# Patient Record
Sex: MALE | Race: WHITE | NOT HISPANIC OR LATINO | Employment: FULL TIME | URBAN - METROPOLITAN AREA
[De-identification: names, ages, dates, MRNs, and addresses within clinical notes are randomized per-mention and may not be internally consistent; named-entity substitution may affect disease eponyms.]

---

## 2017-01-16 ENCOUNTER — OFFICE VISIT (OUTPATIENT)
Dept: ENDOCRINOLOGY | Age: 52
End: 2017-01-16

## 2017-01-16 VITALS
WEIGHT: 315 LBS | HEART RATE: 117 BPM | HEIGHT: 71 IN | SYSTOLIC BLOOD PRESSURE: 144 MMHG | DIASTOLIC BLOOD PRESSURE: 80 MMHG | BODY MASS INDEX: 44.1 KG/M2

## 2017-01-16 DIAGNOSIS — E78.1 HYPERTRIGLYCERIDEMIA: ICD-10-CM

## 2017-01-16 DIAGNOSIS — E11.9 TYPE 2 DIABETES MELLITUS WITHOUT COMPLICATION, WITHOUT LONG-TERM CURRENT USE OF INSULIN (HCC): Primary | ICD-10-CM

## 2017-01-16 DIAGNOSIS — I10 ESSENTIAL HYPERTENSION: ICD-10-CM

## 2017-01-16 PROCEDURE — 99244 OFF/OP CNSLTJ NEW/EST MOD 40: CPT | Performed by: INTERNAL MEDICINE

## 2017-01-16 RX ORDER — CHLORAL HYDRATE 500 MG
1000 CAPSULE ORAL
COMMUNITY
End: 2019-05-10

## 2017-01-16 NOTE — MR AVS SNAPSHOT
[facility] ENDOCRINOLOGY  [phone]                    Lauri Garcia   1/16/2017 12:15 PM   Office Visit    Dept Phone:  [phone]   Encounter #:  [id number]    Provider:  Rakesh Weir MD   Department:  [facility] ENDOCRINOLOGY                Your Full Care Plan              Today's Medication Changes          These changes are accurate as of: 1/16/17  1:12 PM.  If you have any questions, ask your nurse or doctor.               New Medication(s)Ordered:     Canagliflozin 100 MG tablet   Take 100 mg by mouth Daily.   Started by:  Rakesh Weir MD       SITagliptin 100 MG tablet   Commonly known as:  JANUVIA   Take 1 tablet by mouth Daily.   Started by:  Rakesh Weir MD         Medication(s)that have changed:     metFORMIN 1000 MG tablet   Commonly known as:  GLUCOPHAGE   Take 1 tablet by mouth 2 (Two) Times a Day With Meals.   What changed:    - medication strength  - how much to take  - when to take this   Changed by:  Rakesh Weir MD            Where to Get Your Medications      These medications were sent to [facility] [address] [place] [address] [address] AT [facility] [phone] [facility] [phone] [place] [address] [address] [facility] [address]     Phone:  [phone]     Canagliflozin 100 MG tablet    metFORMIN 1000 MG tablet    SITagliptin 100 MG tablet                  Your Updated Medication List          This list is accurate as of: 1/16/17  1:12 PM.  Always use your most recent med list.                allopurinol 300 MG tablet   Commonly known as:  ZYLOPRIM       amoxicillin 500 MG tablet   Commonly known as:  AMOXIL       budesonide-formoterol 160-4.5 MCG/ACT inhaler   Commonly known as:  SYMBICORT   Inhale 2 puffs 2 (two) times a day.       Canagliflozin 100 MG tablet   Take 100 mg by mouth Daily.       cetirizine 10 MG tablet   Commonly known as:  zyrTEC       COCONUT OIL PO       fish oil 1000 MG  capsule capsule       FLAX SEEDS PO       FLONASE ALLERGY RELIEF NA       glucose blood test strip   TEST BLOOD SUGAR TWICE DAILY.       HYDROcodone-acetaminophen 7.5-325 MG per tablet   Commonly known as:  NORCO       KLOR-CON/EF 25 MEQ disintegrating tablet   Generic drug:  potassium bicarbonate       lisinopril 20 MG tablet   Commonly known as:  PRINIVIL,ZESTRIL   Take 1 tablet by mouth Daily.       lisinopril-hydrochlorothiazide 20-25 MG per tablet   Commonly known as:  PRINZIDE,ZESTORETIC   Take 1 tablet by mouth Daily.       melatonin 5 MG tablet tablet       metFORMIN 1000 MG tablet   Commonly known as:  GLUCOPHAGE   Take 1 tablet by mouth 2 (Two) Times a Day With Meals.       montelukast 10 MG tablet   Commonly known as:  SINGULAIR   Take 1 tablet by mouth every night.       MULTIVITAL-M PO       ONE TOUCH LANCETS misc   TEST BLOOD SUGAR TWICE DAILY.       SITagliptin 100 MG tablet   Commonly known as:  JANUVIA   Take 1 tablet by mouth Daily.       VENTOLIN  (90 BASE) MCG/ACT inhaler   Generic drug:  albuterol   INHALE 2 PUFFS BY MOUTH EVERY 4 HOURS AS NEEDED               You Were Diagnosed With        Codes Comments    Type 2 diabetes mellitus without complication, without long-term current use of insulin    -  Primary ICD-10-CM: E11.9  ICD-9-CM: 250.00     Hypertriglyceridemia     ICD-10-CM: E78.1  ICD-9-CM: 272.1     Essential hypertension     ICD-10-CM: I10  ICD-9-CM: 401.9       Instructions     None    Patient Instructions History      Upcoming Appointments     Visit Type Date Time Department    NEW PATIENT 1/16/2017 12:15 PM [facility]    [facility] 4/3/2017  8:40 AM [facility] K[facility]    OFFICE VISIT 4/17/2017  8:30 AM [facility]      MyChart Signup     Our records indicate that you have an active [employer] account.    You can view your After Visit Summary by going to [employer] and logging in with your [employer] username and password.  If you don't  "have a [employer] username and password but a parent or guardian has access to your record, the parent or guardian should login with their own [employer] username and password and access your record to view the After Visit Summary.    If you have questions, you can email [email or web address] or call [phone] to talk to our [employer] staff.  Remember, [employer] is NOT to be used for urgent needs.  For medical emergencies, dial 911.               Other Info from Your Visit           Your Appointments     Apr 03, 2017  8:40 AM EDT   [facility] A[facility]N [facility]IR[facility]   [facility] ENDOCRINOLOGY (--)    [address] [place] [id number] [phone]            Apr 17, 2017  8:30 AM EDT   Office Visit with Rakesh Weir MD   [facility] ENDOCRINOLOGY (--)    [email or web address] [place] [id number] [phone]           Arrive 15 minutes prior to appointment.              Allergies     No Known Allergies      Vital Signs     Blood Pressure Pulse Height Weight Body Mass Index Smoking Status    144/80 117 71\" (180.3 cm) 365 lb (166 kg) 50.91 kg/m2 Never Smoker      Problems and Diagnoses Noted     High blood pressure    Hypertriglyceridemia    Type 2 diabetes mellitus without complication        "

## 2017-01-16 NOTE — PROGRESS NOTES
CC : Type 2 dm    Lauri Garcia 51 y.o. presents with Type 2 dm as a new patient. Referred by Miss. Funez.     Type 2 dm - Diagnosed about 6 - 7 years ago. Was started on oral agents initially. Not on insulin at this time. Currently on Metformin 850 mg po tid with meals. Tolerating the medication with no diarrhea or stomach cramps.   Checks BG 2 times a day.   FBG -  150 - 180 mg/dl and Pre - meals -   150 - 200 mg/dl.   No increased urination or increased thirst. No BG less than 60 mg/dl in the last one month, does have hypoglycemic awareness. Highest BG in the last 1 month was -  226 mg/dl.  Pt got his log book for me. No nausea and vomiting.   Up to date with eye exam in July 2016 and no dm retinopathy.   No tingling or numbness in his b/l feet, no ulcers and amputations of his feet.   No CAD, CKD,CVA per pt.   Pt is not physically active due to asthma, must have been on steroids about 3 - 4 years ago. He is gaining weight since his asthma issues.   Pt tries to follow DM diet for most part.   On Ace inb.  Negative urine microalbuminuria  HbA1c from November 2016-8%    Hx of Kidney stones - in Dec 2010, sees Dr. Peterson. No further attacks after that episode. On Allopurinol and Klor-con.     I have reviewed the patient's allergies, medicines, past medical hx, family hx and social hx in detail.    Past Medical History   Diagnosis Date   • Allergic rhinitis      controlled on meds, followed by allergist (Dr. Kuo)   • Allergic rhinitis    • Asthma      controlled on meds, followed by allergist (Dr. Kuo)   • Diabetes mellitus, type II    • Hypertension    • Kidney stone      on allopurinol and klor-con (Dr. Peterson)   • Sleep apnea      on cpap, followed by sleep medicine   • Sleep apnea        Family History   Problem Relation Age of Onset   • Diabetes Mother    • Hypertension Mother    • Coronary artery disease Father      (60's)       Social History     Social History   • Marital status:      Spouse  name: N/A   • Number of children: N/A   • Years of education: N/A     Occupational History   • Not on file.     Social History Main Topics   • Smoking status: Never Smoker   • Smokeless tobacco: Never Used   • Alcohol use No   • Drug use: No   • Sexual activity: Defer     Other Topics Concern   • Not on file     Social History Narrative       No Known Allergies      Current Outpatient Prescriptions:   •  allopurinol (ZYLOPRIM) 300 MG tablet, , Disp: , Rfl:   •  budesonide-formoterol (SYMBICORT) 160-4.5 MCG/ACT inhaler, Inhale 2 puffs 2 (two) times a day., Disp: 1 inhaler, Rfl: 12  •  cetirizine (ZyrTEC) 10 MG tablet, , Disp: , Rfl:   •  COCONUT OIL PO, Take  by mouth., Disp: , Rfl:   •  Flaxseed, Linseed, (FLAX SEEDS PO), Take  by mouth., Disp: , Rfl:   •  Fluticasone Propionate (FLONASE ALLERGY RELIEF NA), into each nostril., Disp: , Rfl:   •  glucose blood test strip, TEST BLOOD SUGAR TWICE DAILY., Disp: 100 each, Rfl: 3  •  HYDROcodone-acetaminophen (NORCO) 7.5-325 MG per tablet, , Disp: , Rfl:   •  KLOR-CON/EF 25 MEQ disintegrating tablet, TAKE 2 TABLETS BY MOUTH EVERY DAY, Disp: , Rfl: 4  •  lisinopril (PRINIVIL,ZESTRIL) 20 MG tablet, Take 1 tablet by mouth Daily., Disp: 30 tablet, Rfl: 6  •  lisinopril-hydrochlorothiazide (PRINZIDE,ZESTORETIC) 20-25 MG per tablet, Take 1 tablet by mouth Daily., Disp: 30 tablet, Rfl: 5  •  metFORMIN (GLUCOPHAGE) 1000 MG tablet, Take 1 tablet by mouth 2 (Two) Times a Day With Meals., Disp: 60 tablet, Rfl: 3  •  montelukast (SINGULAIR) 10 MG tablet, Take 1 tablet by mouth every night., Disp: 30 tablet, Rfl: 11  •  Multiple Vitamins-Minerals (MULTIVITAL-M PO), Take  by mouth., Disp: , Rfl:   •  Omega-3 Fatty Acids (FISH OIL) 1000 MG capsule capsule, Take  by mouth Daily With Breakfast., Disp: , Rfl:   •  ONE TOUCH LANCETS misc, TEST BLOOD SUGAR TWICE DAILY., Disp: 200 each, Rfl: 3  •  VENTOLIN  (90 BASE) MCG/ACT inhaler, INHALE 2 PUFFS BY MOUTH EVERY 4 HOURS AS NEEDED,  "Disp: 1 inhaler, Rfl: 3  •  amoxicillin (AMOXIL) 500 MG tablet, , Disp: , Rfl:   •  Canagliflozin 100 MG tablet, Take 100 mg by mouth Daily., Disp: 30 tablet, Rfl: 3  •  melatonin 5 MG tablet tablet, Take 5 mg by mouth nightly., Disp: , Rfl:   •  SITagliptin (JANUVIA) 100 MG tablet, Take 1 tablet by mouth Daily., Disp: 30 tablet, Rfl: 3    Review of Systems   Constitutional: Negative for appetite change, chills, diaphoresis and fatigue.   HENT: Negative for hearing loss, nosebleeds, postnasal drip, trouble swallowing and voice change.    Eyes: Negative for photophobia, discharge and visual disturbance.   Respiratory: Positive for shortness of breath and wheezing. Negative for cough.    Cardiovascular: Negative for chest pain, palpitations and leg swelling.   Gastrointestinal: Negative for abdominal distention, abdominal pain, constipation, diarrhea, nausea and vomiting.   Endocrine: Negative for cold intolerance, heat intolerance, polydipsia and polyuria.   Genitourinary: Negative for dysuria, frequency and hematuria.   Musculoskeletal: Negative for arthralgias, back pain and myalgias.   Skin: Negative for pallor, rash and wound.   Neurological: Positive for headaches. Negative for dizziness, tremors, seizures, syncope, weakness and numbness.   Hematological: Negative for adenopathy.   Psychiatric/Behavioral: Positive for sleep disturbance. Negative for agitation and confusion. The patient is not nervous/anxious.        Objective:     Visit Vitals   • /80   • Pulse 117   • Ht 71\" (180.3 cm)   • Wt (!) 365 lb (166 kg)   • BMI 50.91 kg/m2       Physical Exam   Constitutional: He is oriented to person, place, and time. He appears well-developed and well-nourished.   Obese male   HENT:   Head: Normocephalic and atraumatic.   Mouth/Throat: Oropharynx is clear and moist.   Eyes: Conjunctivae and EOM are normal. Pupils are equal, round, and reactive to light.   Neck: Normal range of motion. Neck supple. Carotid bruit " is not present. No tracheal deviation present. No thyromegaly present.   Cardiovascular: Normal rate, regular rhythm and normal heart sounds.    Pulmonary/Chest: Effort normal and breath sounds normal. No stridor. He has no wheezes.   Abdominal: Soft. Bowel sounds are normal. He exhibits no distension. There is no tenderness. No hernia.   Central obesity   Musculoskeletal: Normal range of motion. He exhibits edema.   Lymphadenopathy:     He has no cervical adenopathy.   Neurological: He is alert and oriented to person, place, and time. He has normal reflexes.   Intact proprioception   Skin: Skin is warm and dry.   Psychiatric: He has a normal mood and affect. His behavior is normal. Judgment normal.   Vitals reviewed.         Results Review:    I reviewed the patient's new clinical results.    Lab on 11/10/2016   Component Date Value Ref Range Status   • Total Cholesterol 11/10/2016 171  100 - 199 mg/dL Final   • Triglycerides 11/10/2016 200* 0 - 149 mg/dL Final   • HDL Cholesterol 11/10/2016 32* >39 mg/dL Final    Comment: According to ATP-III Guidelines, HDL-C >59 mg/dL is considered a  negative risk factor for CHD.     • VLDL Cholesterol 11/10/2016 40  5 - 40 mg/dL Final   • LDL Cholesterol  11/10/2016 99  0 - 99 mg/dL Final   • LDL/HDL Ratio 11/10/2016 3.1  0.0 - 3.6 ratio units Final    Comment:                                     LDL/HDL Ratio                                              Men  Women                                1/2 Avg.Risk  1.0    1.5                                    Avg.Risk  3.6    3.2                                 2X Avg.Risk  6.2    5.0                                 3X Avg.Risk  8.0    6.1     • Hemoglobin A1C 11/10/2016 8.0* 4.8 - 5.6 % Final    Comment:          Pre-diabetes: 5.7 - 6.4           Diabetes: >6.4           Glycemic control for adults with diabetes: <7.0     • Glucose 11/10/2016 160* 65 - 99 mg/dL Final   • BUN 11/10/2016 13  6 - 24 mg/dL Final   • Creatinine  11/10/2016 0.87  0.76 - 1.27 mg/dL Final   • eGFR Non African Am 11/10/2016 101  >59 mL/min/1.73 Final   • eGFR African Am 11/10/2016 117  >59 mL/min/1.73 Final   • BUN/Creatinine Ratio 11/10/2016 15  9 - 20 Final   • Sodium 11/10/2016 140  136 - 144 mmol/L Final   • Potassium 11/10/2016 4.5  3.5 - 5.2 mmol/L Final   • Chloride 11/10/2016 95* 97 - 106 mmol/L Final   • Total CO2 11/10/2016 23  18 - 29 mmol/L Final   • Calcium 11/10/2016 9.5  8.7 - 10.2 mg/dL Final   • Total Protein 11/10/2016 6.9  6.0 - 8.5 g/dL Final   • Albumin 11/10/2016 4.1  3.5 - 5.5 g/dL Final   • Globulin 11/10/2016 2.8  1.5 - 4.5 g/dL Final   • A/G Ratio 11/10/2016 1.5  1.1 - 2.5 Final   • Total Bilirubin 11/10/2016 0.3  0.0 - 1.2 mg/dL Final   • Alkaline Phosphatase 11/10/2016 85  39 - 117 IU/L Final   • AST (SGOT) 11/10/2016 39  0 - 40 IU/L Final   • ALT (SGPT) 11/10/2016 57* 0 - 44 IU/L Final   • Creatinine, Urine 11/10/2016 95.8  Not Estab. mg/dL Final   • Microalbumin, Urine 11/10/2016 <3.0  Not Estab. ug/mL Final   • Microalbumin/Creatinine Ratio Urine 11/10/2016 <3.1  0.0 - 30.0 mg/g creat Final       Diagnoses and all orders for this visit:    Type 2 diabetes mellitus without complication, without long-term current use of insulin  -     Hemoglobin A1c; Future  -     Vitamin D 25 Hydroxy; Future  -     Vitamin B12 & Folate; Future  -     TSH; Future  -     Microalbumin / Creatinine Urine Ratio; Future  -     Lipid Panel; Future  -     Comprehensive Metabolic Panel; Future    Hypertriglyceridemia  -     Hemoglobin A1c; Future  -     Vitamin D 25 Hydroxy; Future  -     Vitamin B12 & Folate; Future  -     TSH; Future  -     Microalbumin / Creatinine Urine Ratio; Future  -     Lipid Panel; Future  -     Comprehensive Metabolic Panel; Future    Essential hypertension  -     Hemoglobin A1c; Future  -     Vitamin D 25 Hydroxy; Future  -     Vitamin B12 & Folate; Future  -     TSH; Future  -     Microalbumin / Creatinine Urine Ratio; Future  -      Lipid Panel; Future  -     Comprehensive Metabolic Panel; Future    Other orders  -     SITagliptin (JANUVIA) 100 MG tablet; Take 1 tablet by mouth Daily.  -     Canagliflozin 100 MG tablet; Take 100 mg by mouth Daily.  -     metFORMIN (GLUCOPHAGE) 1000 MG tablet; Take 1 tablet by mouth 2 (Two) Times a Day With Meals.    Type 2 diabetes mellitus with HbA1c of 8% from November 2016  Will change metformin to 1000 mg twice daily  Will add Januvia 100 mg once daily  Will and Invokana 100 mg once daily  Counseled the patient about the side effects of the following medications-no prior history of pancreatitis, no history of thyroid cancer, counseled the patient about increased urination and probable urinary tract  infections.    Hyperlipidemia  Will check lipid panel prior to next visit  Patient takes fish oil, 1 tablet daily.    Morbid obesity  Counseled patient on increased physical activity     The total time spent more than 60 min for old record and lab review and face- to- face, of which greater than 50% of time was spent in counseling the patient on treatment options, instructions for  management/treatment and follow up and importance of compliance with chosen management or treatment options      Rakesh Weir MD  01/16/17

## 2017-01-16 NOTE — LETTER
January 16, 2017     Amelia Fuenz, АНДРЕЙ  [address]  [facility] [address]    Patient: Lauri Garcia   YOB: 1965   Date of Visit: 1/16/2017       Dear АНДРЕЙ Arauz:    Thank you for referring Lauri Garcia to me for evaluation. Below are the relevant portions of my assessment and plan of care.                   If you have questions, please do not hesitate to call me. I look forward to following Lauri along with you.         Sincerely,        Rakesh Weir MD        CC: No Recipients

## 2017-01-16 NOTE — LETTER
January 16, 2017     Amelia Funez, АНДРЕЙ  [address]  [facility] [address]    Patient: Lauri Garcia   YOB: 1965   Date of Visit: 1/16/2017       Dear Dr. NATALIIA Funez, АНДРЕЙ:    Lauri Garcia was in my office today. Below are the relevant portions of my assessment and plan of care.           If you have questions, please do not hesitate to call me. I look forward to following aLuri along with you.         Sincerely,        Rakesh Weir MD        CC: No Recipients

## 2017-01-18 DIAGNOSIS — J45.40 MODERATE PERSISTENT ASTHMA WITHOUT COMPLICATION: ICD-10-CM

## 2017-01-18 DIAGNOSIS — I10 ESSENTIAL HYPERTENSION: Primary | ICD-10-CM

## 2017-01-18 DIAGNOSIS — J45.30 MILD PERSISTENT ASTHMA WITHOUT COMPLICATION: Primary | ICD-10-CM

## 2017-01-18 RX ORDER — LISINOPRIL AND HYDROCHLOROTHIAZIDE 20; 12.5 MG/1; MG/1
2 TABLET ORAL DAILY
Qty: 90 TABLET | Refills: 3 | Status: SHIPPED | OUTPATIENT
Start: 2017-01-18 | End: 2017-01-27 | Stop reason: SDUPTHER

## 2017-01-18 RX ORDER — FLUTICASONE PROPIONATE 50 MCG
2 SPRAY, SUSPENSION (ML) NASAL DAILY
Qty: 1 EACH | Refills: 3 | Status: SHIPPED | OUTPATIENT
Start: 2017-01-18 | End: 2017-02-17

## 2017-01-18 RX ORDER — MONTELUKAST SODIUM 10 MG/1
10 TABLET ORAL NIGHTLY
Qty: 30 TABLET | Refills: 11 | Status: SHIPPED | OUTPATIENT
Start: 2017-01-18 | End: 2018-03-28 | Stop reason: SDUPTHER

## 2017-01-18 RX ORDER — LANCETS
EACH MISCELLANEOUS
Qty: 200 EACH | Refills: 3 | Status: SHIPPED | OUTPATIENT
Start: 2017-01-18 | End: 2018-08-29 | Stop reason: SDUPTHER

## 2017-01-18 NOTE — TELEPHONE ENCOUNTER
Please advise    [facility] pharmacist called and asked if patient can be switched from Symbicort to Breo due to his health insurance.     He also states that the patient is taking two kinds of Lisinopril and wanted us to know that Lisinopril does come in a 20/12.5mg and if we wanted to switch it the patient could take in BID.     90day refill on all.

## 2017-01-18 NOTE — TELEPHONE ENCOUNTER
----- Message from Blanca Palmer sent at 1/18/2017 11:58 AM EST -----  Contact: [facility] pharmacy  [phone]  Needs 90 day supply not 30 day supply which was sent today    SITagliptin (JANUVIA) 100 MG tablet 30 tablet       Take 1 tablet by mouth      anagliflozin (INVOKANA) 100 MG tablet       Take 100 mg by mouth Daily. - Oral      Also--[facility]  metFORMIN (GLUCOPHAGE) 1000 MG tablet 7       Take 1 tablet by mouth 2 (Two) Times a Day With Meals.    Needs accuchex smartview test strips 200 90 day supply 2 x daily    accuchex fast chex lancets 204 90 day supply      Pharmacy     [facility] - [place], [place] - [facility] [address] [facility] [phone] [facility] [phone] FX              Sent to [facility] pharmacy

## 2017-01-27 DIAGNOSIS — I10 ESSENTIAL HYPERTENSION: ICD-10-CM

## 2017-01-27 RX ORDER — LISINOPRIL AND HYDROCHLOROTHIAZIDE 20; 12.5 MG/1; MG/1
2 TABLET ORAL DAILY
Qty: 90 TABLET | Refills: 3 | Status: SHIPPED | OUTPATIENT
Start: 2017-01-27

## 2017-01-27 NOTE — TELEPHONE ENCOUNTER
Pt called stating that mediation was sent to wrong pharmacy sent Lisinopril hydrochlorothiazide 20-12.5 to [facility]

## 2017-04-03 ENCOUNTER — LAB (OUTPATIENT)
Dept: ENDOCRINOLOGY | Age: 52
End: 2017-04-03

## 2017-04-03 DIAGNOSIS — I10 ESSENTIAL HYPERTENSION: ICD-10-CM

## 2017-04-03 DIAGNOSIS — E11.9 TYPE 2 DIABETES MELLITUS WITHOUT COMPLICATION, WITHOUT LONG-TERM CURRENT USE OF INSULIN (HCC): ICD-10-CM

## 2017-04-03 DIAGNOSIS — E78.1 HYPERTRIGLYCERIDEMIA: ICD-10-CM

## 2017-04-03 DIAGNOSIS — E55.9 VITAMIN D DEFICIENCY: ICD-10-CM

## 2017-04-03 DIAGNOSIS — E55.9 VITAMIN D DEFICIENCY: Primary | ICD-10-CM

## 2017-04-04 LAB
25(OH)D3+25(OH)D2 SERPL-MCNC: 42 NG/ML (ref 30–100)
ALBUMIN SERPL-MCNC: 4.3 G/DL (ref 3.5–5.2)
ALBUMIN/CREAT UR: <2.4 MG/G CREAT (ref 0–30)
ALBUMIN/GLOB SERPL: 1.7 G/DL
ALP SERPL-CCNC: 73 U/L (ref 39–117)
ALT SERPL-CCNC: 51 U/L (ref 1–41)
AST SERPL-CCNC: 35 U/L (ref 1–40)
BILIRUB SERPL-MCNC: 0.4 MG/DL (ref 0.1–1.2)
BUN SERPL-MCNC: 14 MG/DL (ref 6–20)
BUN/CREAT SERPL: 13.1 (ref 7–25)
CALCIUM SERPL-MCNC: 10.3 MG/DL (ref 8.6–10.5)
CHLORIDE SERPL-SCNC: 97 MMOL/L (ref 98–107)
CHOLEST SERPL-MCNC: 152 MG/DL (ref 0–200)
CO2 SERPL-SCNC: 29.1 MMOL/L (ref 22–29)
CREAT SERPL-MCNC: 1.07 MG/DL (ref 0.76–1.27)
CREAT UR-MCNC: 123.4 MG/DL
FOLATE SERPL-MCNC: 18.86 NG/ML (ref 4.78–24.2)
GLOBULIN SER CALC-MCNC: 2.5 GM/DL
GLUCOSE SERPL-MCNC: 115 MG/DL (ref 65–99)
HBA1C MFR BLD: 7 % (ref 4.8–5.6)
HDLC SERPL-MCNC: 27 MG/DL (ref 40–60)
LDLC SERPL CALC-MCNC: 88 MG/DL (ref 0–100)
MICROALBUMIN UR-MCNC: <3 UG/ML
POTASSIUM SERPL-SCNC: 4.6 MMOL/L (ref 3.5–5.2)
PROT SERPL-MCNC: 6.8 G/DL (ref 6–8.5)
SODIUM SERPL-SCNC: 140 MMOL/L (ref 136–145)
TRIGL SERPL-MCNC: 187 MG/DL (ref 0–150)
TSH SERPL DL<=0.005 MIU/L-ACNC: 3.11 MIU/ML (ref 0.27–4.2)
VIT B12 SERPL-MCNC: 372 PG/ML (ref 211–946)
VLDLC SERPL CALC-MCNC: 37.4 MG/DL (ref 5–40)

## 2017-04-24 ENCOUNTER — OFFICE VISIT (OUTPATIENT)
Dept: ENDOCRINOLOGY | Age: 52
End: 2017-04-24

## 2017-04-24 VITALS
HEART RATE: 96 BPM | HEIGHT: 72 IN | BODY MASS INDEX: 42.66 KG/M2 | OXYGEN SATURATION: 92 % | DIASTOLIC BLOOD PRESSURE: 74 MMHG | SYSTOLIC BLOOD PRESSURE: 112 MMHG | WEIGHT: 315 LBS

## 2017-04-24 DIAGNOSIS — E78.49 OTHER HYPERLIPIDEMIA: ICD-10-CM

## 2017-04-24 DIAGNOSIS — IMO0002 UNCONTROLLED TYPE 2 DIABETES MELLITUS WITH COMPLICATION, WITHOUT LONG-TERM CURRENT USE OF INSULIN: Primary | ICD-10-CM

## 2017-04-24 PROCEDURE — 99214 OFFICE O/P EST MOD 30 MIN: CPT | Performed by: INTERNAL MEDICINE

## 2017-04-24 NOTE — PROGRESS NOTES
52 y.o.    Patient Care Team:  АНДРЕЙ Saez as PCP - General (Family Medicine)    Chief Complaint: Type 2 dm     Subjective     HPI  Lauri Garcia 51 y.o. presents with Type 2 dm as a follow up patient. Referred by Miss. Funez.      Type 2 dm - Diagnosed about 6 - 7 years ago. Was started on oral agents initially. Not on insulin at this time. Currently on Metformin 1000 mg po bid, Januvia 100 mg po daily, Invokana 100 mg po daily. Tolerating the medications with no diarrhea or stomach cramps, no burning sensation while urinating.   Checks BG 2 times a day.   FBG - 120 - 130  mg/dl and Pre - meals - 160 - 170 mg/dl.   C/o increased urination or increased thirst on invokana. No BG less than 60 mg/dl in the last one month, does have hypoglycemic awareness. Highest BG in the last 1 month was - 180 mg/dl.  Pt got his log book for me. No nausea and vomiting.   Up to date with eye exam in July 2016 and no dm retinopathy.   No tingling or numbness in his b/l feet, no ulcers and amputations of his feet.   No CAD, CKD,CVA per pt.   Pt is not physically active due to asthma, must have been on steroids about 3 - 4 years ago. He is gaining weight since his asthma issues.   Pt tries to follow DM diet for most part.   On Ace inb.  Negative urine microalbuminuria    Hba1c improved to 7%.      Hx of Kidney stones - in Dec 2010, sees Dr. Peterson. No further attacks after that episode. On Allopurinol and Klor-con.     Hyperlipidemia-on fish oil.     Interval History      The following portions of the patient's history were reviewed and updated as appropriate: allergies, current medications, past family history, past medical history, past social history, past surgical history and problem list.    Past Medical History:   Diagnosis Date   • Allergic rhinitis     controlled on meds, followed by allergist (Dr. Kuo)   • Allergic rhinitis    • Asthma     controlled on meds, followed by allergist (Dr. Kuo)   • Diabetes mellitus,  type II    • Hypertension    • Kidney stone     on allopurinol and klor-con (Dr. Peterson)   • Sleep apnea     on cpap, followed by sleep medicine   • Sleep apnea      Family History   Problem Relation Age of Onset   • Diabetes Mother    • Hypertension Mother    • Coronary artery disease Father      (60's)     Social History     Social History   • Marital status:      Spouse name: N/A   • Number of children: N/A   • Years of education: N/A     Occupational History   • Not on file.     Social History Main Topics   • Smoking status: Never Smoker   • Smokeless tobacco: Never Used   • Alcohol use No   • Drug use: No   • Sexual activity: Defer     Other Topics Concern   • Not on file     Social History Narrative     No Known Allergies    Current Outpatient Prescriptions:   •  ACCU-CHEK FASTCLIX LANCETS misc, Use to check blood sugar 2 times daily, Disp: 200 each, Rfl: 3  •  allopurinol (ZYLOPRIM) 300 MG tablet, , Disp: , Rfl:   •  Canagliflozin (INVOKANA) 100 MG tablet, Take 100 mg by mouth Daily., Disp: 90 tablet, Rfl: 3  •  cetirizine (ZyrTEC) 10 MG tablet, , Disp: , Rfl:   •  COCONUT OIL PO, Take  by mouth., Disp: , Rfl:   •  Flaxseed, Linseed, (FLAX SEEDS PO), Take  by mouth., Disp: , Rfl:   •  Fluticasone Furoate-Vilanterol (BREO ELLIPTA) 100-25 MCG/INH aerosol powder , Inhale 1 puff Daily., Disp: 1 each, Rfl: 11  •  glucose blood (ACCU-CHEK SMARTVIEW) test strip, Use to check blood sugar 2 times daily, Disp: 200 each, Rfl: 3  •  KLOR-CON/EF 25 MEQ disintegrating tablet, TAKE 2 TABLETS BY MOUTH EVERY DAY, Disp: , Rfl: 4  •  lisinopril (PRINIVIL,ZESTRIL) 20 MG tablet, Take 1 tablet by mouth Daily., Disp: 30 tablet, Rfl: 6  •  lisinopril-hydrochlorothiazide (ZESTORETIC) 20-12.5 MG per tablet, Take 2 tablets by mouth Daily., Disp: 90 tablet, Rfl: 3  •  melatonin 5 MG tablet tablet, Take 5 mg by mouth nightly., Disp: , Rfl:   •  metFORMIN (GLUCOPHAGE) 1000 MG tablet, Take 1 tablet by mouth 2 (Two) Times a Day  "With Meals., Disp: 180 tablet, Rfl: 3  •  montelukast (SINGULAIR) 10 MG tablet, Take 1 tablet by mouth Every Night., Disp: 30 tablet, Rfl: 11  •  Multiple Vitamins-Minerals (MULTIVITAL-M PO), Take  by mouth., Disp: , Rfl:   •  Omega-3 Fatty Acids (FISH OIL) 1000 MG capsule capsule, Take  by mouth Daily With Breakfast., Disp: , Rfl:   •  SITagliptin (JANUVIA) 100 MG tablet, Take 1 tablet by mouth Daily., Disp: 90 tablet, Rfl: 3  •  VENTOLIN  (90 BASE) MCG/ACT inhaler, INHALE 2 PUFFS BY MOUTH EVERY 4 HOURS AS NEEDED, Disp: 1 inhaler, Rfl: 3        Review of Systems   Constitutional: Negative for fever.   HENT: Negative for facial swelling, nosebleeds, trouble swallowing and voice change.    Eyes: Negative for pain and redness.   Respiratory: Negative for shortness of breath and wheezing.    Cardiovascular: Negative for palpitations and leg swelling.   Gastrointestinal: Negative for abdominal pain, diarrhea and vomiting.   Endocrine: Negative for polydipsia and polyuria.   Genitourinary: Negative for decreased urine volume and frequency.   Musculoskeletal: Negative for joint swelling and neck pain.   Skin: Negative for rash.   Allergic/Immunologic: Negative for immunocompromised state.   Neurological: Negative for seizures and facial asymmetry.   Hematological: Does not bruise/bleed easily.   Psychiatric/Behavioral: Negative for agitation and confusion.       Objective       Vitals:    04/24/17 0850   BP: 112/74   Pulse: 96   SpO2: 92%   Weight: (!) 338 lb 9.6 oz (154 kg)   Height: 71.5\" (181.6 cm)     Body mass index is 46.57 kg/(m^2).      Physical Exam   Constitutional: He is oriented to person, place, and time. He appears well-nourished.   Morbidly obese   HENT:   Head: Normocephalic and atraumatic.   Eyes: Conjunctivae and EOM are normal.   Neck: Normal range of motion. Neck supple. Carotid bruit is not present. No thyromegaly present.   Acanthosis nigricans   Cardiovascular: Normal rate and normal heart " sounds.    Pulmonary/Chest: Effort normal and breath sounds normal. No stridor. No respiratory distress. He has no wheezes.   Abdominal: Soft. Bowel sounds are normal. He exhibits no distension. There is no tenderness.   Central obesity   Musculoskeletal: He exhibits no edema or tenderness.   Lymphadenopathy:     He has no cervical adenopathy.   Neurological: He is alert and oriented to person, place, and time.   Decreased pin prick and intact proprioception   Skin: Skin is warm and dry.   Psychiatric: He has a normal mood and affect. His behavior is normal.   Vitals reviewed.    Results Review:     I reviewed the patient's new clinical results.    Medical records reviewed  Summary:done      Lab on 04/03/2017   Component Date Value Ref Range Status   • Hemoglobin A1C 04/03/2017 7.00* 4.80 - 5.60 % Final    Comment: Hemoglobin A1C Ranges:  Increased Risk for Diabetes  5.7% to 6.4%  Diabetes                     >= 6.5%  Diabetic Goal                < 7.0%     • Vitamin B-12 04/03/2017 372  211 - 946 pg/mL Final   • Folate 04/03/2017 18.86  4.78 - 24.20 ng/mL Final   • TSH 04/03/2017 3.110  0.270 - 4.200 mIU/mL Final   • Creatinine, Urine 04/03/2017 123.4  Not Estab. mg/dL Final   • Microalbumin, Urine 04/03/2017 <3.0  Not Estab. ug/mL Final   • Microalbumin/Creatinine Ratio Urine 04/03/2017 <2.4  0.0 - 30.0 mg/g creat Final   • Total Cholesterol 04/03/2017 152  0 - 200 mg/dL Final   • Triglycerides 04/03/2017 187* 0 - 150 mg/dL Final   • HDL Cholesterol 04/03/2017 27* 40 - 60 mg/dL Final   • VLDL Cholesterol 04/03/2017 37.4  5 - 40 mg/dL Final   • LDL Cholesterol  04/03/2017 88  0 - 100 mg/dL Final   • Glucose 04/03/2017 115* 65 - 99 mg/dL Final   • BUN 04/03/2017 14  6 - 20 mg/dL Final   • Creatinine 04/03/2017 1.07  0.76 - 1.27 mg/dL Final   • eGFR Non African Am 04/03/2017 73  >60 mL/min/1.73 Final   • eGFR African Am 04/03/2017 89  >60 mL/min/1.73 Final   • BUN/Creatinine Ratio 04/03/2017 13.1  7.0 - 25.0 Final    • Sodium 04/03/2017 140  136 - 145 mmol/L Final   • Potassium 04/03/2017 4.6  3.5 - 5.2 mmol/L Final   • Chloride 04/03/2017 97* 98 - 107 mmol/L Final   • Total CO2 04/03/2017 29.1* 22.0 - 29.0 mmol/L Final   • Calcium 04/03/2017 10.3  8.6 - 10.5 mg/dL Final   • Total Protein 04/03/2017 6.8  6.0 - 8.5 g/dL Final   • Albumin 04/03/2017 4.30  3.50 - 5.20 g/dL Final   • Globulin 04/03/2017 2.5  gm/dL Final   • A/G Ratio 04/03/2017 1.7  g/dL Final   • Total Bilirubin 04/03/2017 0.4  0.1 - 1.2 mg/dL Final   • Alkaline Phosphatase 04/03/2017 73  39 - 117 U/L Final   • AST (SGOT) 04/03/2017 35  1 - 40 U/L Final   • ALT (SGPT) 04/03/2017 51* 1 - 41 U/L Final   • 25 Hydroxy, Vitamin D 04/03/2017 42.0  30.0 - 100.0 ng/mL Final    Comment: Vitamin D deficiency has been defined by the [place] [employer] and an Endocrine Society practice guideline as a  level of serum 25-OH vitamin D less than 20 ng/mL (1,2).  The Endocrine Society went on to further define vitamin D  insufficiency as a level between 21 and 29 ng/mL (2).  1. IOM ([place] [employer]). 2010. Dietary reference     intakes for calcium and D. [place]: The     National Academies Press.  2. Catrachito MF, Yesica BAKER, Tanvir CARDONA, et al.     Evaluation, treatment, and prevention of vitamin D     deficiency: an Endocrine Society clinical practice     guideline. JCEM. 2011 Jul; 96(9):1911-30.       Lab Results   Component Value Date    HGBA1C 7.00 (H) 04/03/2017    HGBA1C 8.0 (H) 11/10/2016    HGBA1C 7.90 (H) 08/10/2016     Lab Results   Component Value Date    MICROALBUR <3.0 04/03/2017    CREATININE 1.07 04/03/2017     Imaging Results (most recent)     None                Assessment and Plan:    Diagnoses and all orders for this visit:    Uncontrolled type 2 diabetes mellitus with complication, without long-term current use of insulin  -     Hemoglobin A1c; Future  -     Comprehensive Metabolic Panel; Future    Other hyperlipidemia  -     Hemoglobin  A1c; Future  -     Comprehensive Metabolic Panel; Future    Type 2 diabetes mellitus  HbA1c improving  Continue the current hypoglycemic agents.  Counseled patient to check his blood sugars 2-3 times a day.  Counseled patient on the side effects of the oral hypoglycemic agents.  Counseled patient to let others know when he goes on steroids so that we can and adjust his oral hypoglycemic agents or try insulin at that time.    Hyperlipidemia  Continue diet and exercise.  On fish oil.     13 minutes out of 25 minutes face to face spent in counseling the patient extensively on discussing the side effects of the medications and monitoring for them, how steroids would affect her blood sugars.

## 2017-07-10 ENCOUNTER — LAB (OUTPATIENT)
Dept: ENDOCRINOLOGY | Age: 52
End: 2017-07-10

## 2017-07-10 DIAGNOSIS — IMO0002 UNCONTROLLED TYPE 2 DIABETES MELLITUS WITH COMPLICATION, WITHOUT LONG-TERM CURRENT USE OF INSULIN: ICD-10-CM

## 2017-07-10 DIAGNOSIS — E78.49 OTHER HYPERLIPIDEMIA: ICD-10-CM

## 2017-07-10 LAB
ALBUMIN SERPL-MCNC: 4.4 G/DL (ref 3.5–5.2)
ALBUMIN/GLOB SERPL: 1.8 G/DL
ALP SERPL-CCNC: 70 U/L (ref 39–117)
ALT SERPL-CCNC: 29 U/L (ref 1–41)
AST SERPL-CCNC: 21 U/L (ref 1–40)
BILIRUB SERPL-MCNC: 0.4 MG/DL (ref 0.1–1.2)
BUN SERPL-MCNC: 16 MG/DL (ref 6–20)
BUN/CREAT SERPL: 15.1 (ref 7–25)
CALCIUM SERPL-MCNC: 9.7 MG/DL (ref 8.6–10.5)
CHLORIDE SERPL-SCNC: 95 MMOL/L (ref 98–107)
CO2 SERPL-SCNC: 28.6 MMOL/L (ref 22–29)
CREAT SERPL-MCNC: 1.06 MG/DL (ref 0.76–1.27)
GLOBULIN SER CALC-MCNC: 2.4 GM/DL
GLUCOSE SERPL-MCNC: 119 MG/DL (ref 65–99)
HBA1C MFR BLD: 6.8 % (ref 4.8–5.6)
POTASSIUM SERPL-SCNC: 4.3 MMOL/L (ref 3.5–5.2)
PROT SERPL-MCNC: 6.8 G/DL (ref 6–8.5)
SODIUM SERPL-SCNC: 138 MMOL/L (ref 136–145)

## 2017-07-24 ENCOUNTER — OFFICE VISIT (OUTPATIENT)
Dept: ENDOCRINOLOGY | Age: 52
End: 2017-07-24

## 2017-07-24 VITALS
WEIGHT: 315 LBS | OXYGEN SATURATION: 96 % | HEART RATE: 96 BPM | HEIGHT: 71 IN | BODY MASS INDEX: 44.1 KG/M2 | SYSTOLIC BLOOD PRESSURE: 112 MMHG | DIASTOLIC BLOOD PRESSURE: 62 MMHG

## 2017-07-24 DIAGNOSIS — IMO0002 UNCONTROLLED TYPE 2 DIABETES MELLITUS WITH COMPLICATION, WITHOUT LONG-TERM CURRENT USE OF INSULIN: Primary | ICD-10-CM

## 2017-07-24 DIAGNOSIS — E78.1 HYPERTRIGLYCERIDEMIA: ICD-10-CM

## 2017-07-24 DIAGNOSIS — E78.49 OTHER HYPERLIPIDEMIA: ICD-10-CM

## 2017-07-24 PROCEDURE — 99214 OFFICE O/P EST MOD 30 MIN: CPT | Performed by: INTERNAL MEDICINE

## 2017-07-24 NOTE — PROGRESS NOTES
52 y.o.    Patient Care Team:  АНДРЕЙ Saez as PCP - General (Family Medicine)    Chief Complaint:    3 Month Follow Up/ Type 2 Diabetes Mellitus  Subjective     HPI  Lauri Garcia 52 y.o. presents with Type 2 dm as a follow up patient. Referred by Phyllis Armin.     Type 2 diabetes mellitus-diagnosed about 7 years ago.  Currently on oral hypoglycemic agents.  On metformin thousand milligrams twice daily, Januvia 100 mg once daily, invokana 100 mg daily .  Tolerating the medications with no significant diarrhea or stomach cramps, no urinary tract infections.  Checks blood sugars 2 times a day.  Fasting blood [id number] 40 mg/dL and pre-meal blood [id number] 50 mg/dL.  Improved urination and thirst.  No blood sugar less than 60 in the last one month, does have hypoglycemic awareness.  Highest blood sugar in the last 1 month was 1 70 mg/dL.  Up to date with his eye exam in a few weeks ago-July 2017 and no diabetic retinopathy.  Patient got his log book for me to review.  No tingling or numbness in his bilateral feet, no ulcers or amputations.  No history of CAD, CK D, CVA.  Patient is trying to be physically active.  No recent asthma attacks and not been on steroids.  He is following diabetic diet for most part.  On Ace inhibitor   [id number]%.    History of kidney stones-no recent kidney stones.  Managed by urologist.    Hyperlipidemia  On fish oil.        Morbid obesity  Patient lost about 15 pounds of weight since his last visit by counting calories. He is monitoring his steps on the fit bit.     Interval History      The following portions of the patient's history were reviewed and updated as appropriate: allergies, current medications, past family history, past medical history, past social history, past surgical history and problem list.    Past Medical History:   Diagnosis Date   • Allergic rhinitis     controlled on meds, followed by allergist (Dr. Kuo)   • Allergic rhinitis    • Asthma     controlled  on meds, followed by allergist (Dr. Kuo)   • Diabetes mellitus, type II    • Hypertension    • Kidney stone     on allopurinol and klor-con (Dr. Peterson)   • Sleep apnea     on cpap, followed by sleep medicine   • Sleep apnea      Family History   Problem Relation Age of Onset   • Diabetes Mother    • Hypertension Mother    • Coronary artery disease Father      (60's)     Social History     Social History   • Marital status:      Spouse name: N/A   • Number of children: N/A   • Years of education: N/A     Occupational History   • Not on file.     Social History Main Topics   • Smoking status: Never Smoker   • Smokeless tobacco: Never Used   • Alcohol use No   • Drug use: No   • Sexual activity: Defer     Other Topics Concern   • Not on file     Social History Narrative     No Known Allergies    Current Outpatient Prescriptions:   •  ACCU-CHEK FASTCLIX LANCETS misc, Use to check blood sugar 2 times daily, Disp: 200 each, Rfl: 3  •  allopurinol (ZYLOPRIM) 300 MG tablet, , Disp: , Rfl:   •  Canagliflozin (INVOKANA) 100 MG tablet, Take 100 mg by mouth Daily., Disp: 90 tablet, Rfl: 3  •  cetirizine (ZyrTEC) 10 MG tablet, , Disp: , Rfl:   •  COCONUT OIL PO, Take  by mouth., Disp: , Rfl:   •  Flaxseed, Linseed, (FLAX SEEDS PO), Take  by mouth., Disp: , Rfl:   •  Fluticasone Furoate-Vilanterol (BREO ELLIPTA) 100-25 MCG/INH aerosol powder , Inhale 1 puff Daily., Disp: 1 each, Rfl: 11  •  glucose blood (ACCU-CHEK SMARTVIEW) test strip, Use to check blood sugar 2 times daily, Disp: 200 each, Rfl: 3  •  lisinopril (PRINIVIL,ZESTRIL) 20 MG tablet, Take 1 tablet by mouth Daily., Disp: 30 tablet, Rfl: 6  •  lisinopril-hydrochlorothiazide (ZESTORETIC) 20-12.5 MG per tablet, Take 2 tablets by mouth Daily., Disp: 90 tablet, Rfl: 3  •  melatonin 5 MG tablet tablet, Take 5 mg by mouth nightly., Disp: , Rfl:   •  metFORMIN (GLUCOPHAGE) 1000 MG tablet, Take 1 tablet by mouth 2 (Two) Times a Day With Meals., Disp: 180 tablet,  "Rfl: 3  •  montelukast (SINGULAIR) 10 MG tablet, Take 1 tablet by mouth Every Night., Disp: 30 tablet, Rfl: 11  •  Multiple Vitamins-Minerals (MULTIVITAL-M PO), Take  by mouth., Disp: , Rfl:   •  Omega-3 Fatty Acids (FISH OIL) 1000 MG capsule capsule, Take  by mouth Daily With Breakfast., Disp: , Rfl:   •  SITagliptin (JANUVIA) 100 MG tablet, Take 1 tablet by mouth Daily., Disp: 90 tablet, Rfl: 3  •  VENTOLIN  (90 BASE) MCG/ACT inhaler, INHALE 2 PUFFS BY MOUTH EVERY 4 HOURS AS NEEDED, Disp: 1 inhaler, Rfl: 3  •  KLOR-CON/EF 25 MEQ disintegrating tablet, TAKE 2 TABLETS BY MOUTH EVERY DAY, Disp: , Rfl: 4        Review of Systems   Constitutional: Negative for fever.   HENT: Negative for facial swelling, nosebleeds, trouble swallowing and voice change.    Eyes: Negative for pain and redness.   Respiratory: Positive for shortness of breath and wheezing.    Cardiovascular: Negative for palpitations and leg swelling.   Gastrointestinal: Negative for abdominal pain, diarrhea and vomiting.   Endocrine: Negative for polydipsia and polyuria.   Genitourinary: Negative for decreased urine volume and frequency.   Musculoskeletal: Negative for joint swelling and neck pain.   Skin: Negative for rash.   Allergic/Immunologic: Negative for immunocompromised state.   Neurological: Negative for seizures and facial asymmetry.   Hematological: Does not bruise/bleed easily.   Psychiatric/Behavioral: Negative for agitation and confusion.       Objective       Vitals:    07/24/17 0948   BP: 112/62   Pulse: 96   SpO2: 96%   Weight: (!) 323 lb (147 kg)   Height: 71\" (180.3 cm)     Body mass index is 45.05 kg/(m^2).      Physical Exam   Constitutional: He is oriented to person, place, and time. He appears well-nourished.   obese   HENT:   Head: Normocephalic and atraumatic.   Eyes: Conjunctivae and EOM are normal.   Neck: Normal range of motion. Neck supple. Carotid bruit is not present. No thyromegaly present.   Wide neck "   Cardiovascular: Normal rate and normal heart sounds.    HR - 68   Pulmonary/Chest: Effort normal. No stridor. No respiratory distress. He has no wheezes.   Decreased BS at bases   Abdominal: Soft. Bowel sounds are normal. He exhibits no distension. There is no tenderness.   Central obesity   Musculoskeletal: He exhibits edema. He exhibits no tenderness.   Lymphadenopathy:     He has no cervical adenopathy.   Neurological: He is alert and oriented to person, place, and time.   Skin: Skin is warm and dry.   Psychiatric: He has a normal mood and affect. His behavior is normal.   Vitals reviewed.    Results Review:     I reviewed the patient's new clinical results.    Medical records reviewed  Summary: done      Lab on 07/10/2017   Component Date Value Ref Range Status   • Hemoglobin A1C 07/10/2017 6.80* 4.80 - 5.60 % Final    Comment: Hemoglobin A1C Ranges:  Increased Risk for Diabetes  5.7% to 6.4%  Diabetes                     >= 6.5%  Diabetic Goal                < 7.0%     • Glucose 07/10/2017 119* 65 - 99 mg/dL Final   • BUN 07/10/2017 16  6 - 20 mg/dL Final   • Creatinine 07/10/2017 1.06  0.76 - 1.27 mg/dL Final   • eGFR Non African Am 07/10/2017 74  >60 mL/min/1.73 Final   • eGFR African Am 07/10/2017 90  >60 mL/min/1.73 Final   • BUN/Creatinine Ratio 07/10/2017 15.1  7.0 - 25.0 Final   • Sodium 07/10/2017 138  136 - 145 mmol/L Final   • Potassium 07/10/2017 4.3  3.5 - 5.2 mmol/L Final   • Chloride 07/10/2017 95* 98 - 107 mmol/L Final   • Total CO2 07/10/2017 28.6  22.0 - 29.0 mmol/L Final   • Calcium 07/10/2017 9.7  8.6 - 10.5 mg/dL Final   • Total Protein 07/10/2017 6.8  6.0 - 8.5 g/dL Final   • Albumin 07/10/2017 4.40  3.50 - 5.20 g/dL Final   • Globulin 07/10/2017 2.4  gm/dL Final   • A/G Ratio 07/10/2017 1.8  g/dL Final   • Total Bilirubin 07/10/2017 0.4  0.1 - 1.2 mg/dL Final   • Alkaline Phosphatase 07/10/2017 70  39 - 117 U/L Final   • AST (SGOT) 07/10/2017 21  1 - 40 U/L Final   • ALT (SGPT)  07/10/2017 29  1 - 41 U/L Final     Lab Results   Component Value Date    HGBA1C 6.80 (H) 07/10/2017    HGBA1C 7.00 (H) 04/03/2017    HGBA1C 8.0 (H) 11/10/2016     Lab Results   Component Value Date    MICROALBUR <3.0 04/03/2017    CREATININE 1.06 07/10/2017     Imaging Results (most recent)     None                Assessment and Plan:    Lauri was seen today for diabetes.    Diagnoses and all orders for this visit:    Uncontrolled type 2 diabetes mellitus with complication, without long-term current use of insulin  -     Hemoglobin A1c; Future  -     Comprehensive Metabolic Panel; Future  -     Lipid Panel; Future  -     Microalbumin / Creatinine Urine Ratio; Future  -     TSH; Future  -     Vitamin B12 & Folate; Future  -     Vitamin D 25 Hydroxy; Future    Other hyperlipidemia  -     Hemoglobin A1c; Future  -     Comprehensive Metabolic Panel; Future  -     Lipid Panel; Future  -     Microalbumin / Creatinine Urine Ratio; Future  -     TSH; Future  -     Vitamin B12 & Folate; Future  -     Vitamin D 25 Hydroxy; Future    Hypertriglyceridemia  -     Hemoglobin A1c; Future  -     Comprehensive Metabolic Panel; Future  -     Lipid Panel; Future  -     Microalbumin / Creatinine Urine Ratio; Future  -     TSH; Future  -     Vitamin B12 & Folate; Future  -     Vitamin D 25 Hydroxy; Future    Type 2 diabetes mellitus-well controlled  Continue the current oral hypoglycemic agents.  Advised patient to continue to check his blood sugars one to 2 times a day.  Counseled patient to follow diet restrictions.    Hyperlipidemia  Continue facial about one tablet twice daily.    Morbid obesity  Discussed with the patient about various weight loss options.  At this point patient is counting calories and is monitoring fit bit.  Will discuss about weight loss medication options/metabolic training program during next visit.    13 minutes out of 25 minutes face to face spent in counseling the patient extensively on weight loss  instructions.

## 2017-11-13 ENCOUNTER — LAB (OUTPATIENT)
Dept: ENDOCRINOLOGY | Age: 52
End: 2017-11-13

## 2017-11-13 DIAGNOSIS — E78.1 HYPERTRIGLYCERIDEMIA: ICD-10-CM

## 2017-11-13 DIAGNOSIS — E78.49 OTHER HYPERLIPIDEMIA: ICD-10-CM

## 2017-11-13 DIAGNOSIS — IMO0002 UNCONTROLLED TYPE 2 DIABETES MELLITUS WITH COMPLICATION, WITHOUT LONG-TERM CURRENT USE OF INSULIN: ICD-10-CM

## 2017-11-14 LAB
25(OH)D3+25(OH)D2 SERPL-MCNC: 39.4 NG/ML (ref 30–100)
ALBUMIN SERPL-MCNC: 4.4 G/DL (ref 3.5–5.2)
ALBUMIN/CREAT UR: 3.3 MG/G CREAT (ref 0–30)
ALBUMIN/GLOB SERPL: 1.5 G/DL
ALP SERPL-CCNC: 80 U/L (ref 39–117)
ALT SERPL-CCNC: 33 U/L (ref 1–41)
AST SERPL-CCNC: 23 U/L (ref 1–40)
BILIRUB SERPL-MCNC: 0.4 MG/DL (ref 0.1–1.2)
BUN SERPL-MCNC: 22 MG/DL (ref 6–20)
BUN/CREAT SERPL: 21 (ref 7–25)
CALCIUM SERPL-MCNC: 10.5 MG/DL (ref 8.6–10.5)
CHLORIDE SERPL-SCNC: 98 MMOL/L (ref 98–107)
CHOLEST SERPL-MCNC: 158 MG/DL (ref 0–200)
CO2 SERPL-SCNC: 31.4 MMOL/L (ref 22–29)
CREAT SERPL-MCNC: 1.05 MG/DL (ref 0.76–1.27)
CREAT UR-MCNC: 126.1 MG/DL
FOLATE SERPL-MCNC: 10.77 NG/ML (ref 4.78–24.2)
GFR SERPLBLD CREATININE-BSD FMLA CKD-EPI: 75 ML/MIN/1.73
GFR SERPLBLD CREATININE-BSD FMLA CKD-EPI: 91 ML/MIN/1.73
GLOBULIN SER CALC-MCNC: 2.9 GM/DL
GLUCOSE SERPL-MCNC: 108 MG/DL (ref 65–99)
HBA1C MFR BLD: 6.6 % (ref 4.8–5.6)
HDLC SERPL-MCNC: 33 MG/DL (ref 40–60)
LDLC SERPL CALC-MCNC: 90 MG/DL (ref 0–100)
MICROALBUMIN UR-MCNC: 4.2 UG/ML
POTASSIUM SERPL-SCNC: 4.3 MMOL/L (ref 3.5–5.2)
PROT SERPL-MCNC: 7.3 G/DL (ref 6–8.5)
SODIUM SERPL-SCNC: 142 MMOL/L (ref 136–145)
TRIGL SERPL-MCNC: 175 MG/DL (ref 0–150)
TSH SERPL DL<=0.005 MIU/L-ACNC: 3.41 MIU/ML (ref 0.27–4.2)
VIT B12 SERPL-MCNC: 318 PG/ML (ref 211–946)
VLDLC SERPL CALC-MCNC: 35 MG/DL (ref 5–40)

## 2017-11-27 ENCOUNTER — OFFICE VISIT (OUTPATIENT)
Dept: ENDOCRINOLOGY | Age: 52
End: 2017-11-27

## 2017-11-27 VITALS
HEIGHT: 71 IN | SYSTOLIC BLOOD PRESSURE: 122 MMHG | BODY MASS INDEX: 44.1 KG/M2 | OXYGEN SATURATION: 95 % | HEART RATE: 96 BPM | WEIGHT: 315 LBS | DIASTOLIC BLOOD PRESSURE: 70 MMHG

## 2017-11-27 DIAGNOSIS — E78.5 HYPERLIPIDEMIA, UNSPECIFIED HYPERLIPIDEMIA TYPE: ICD-10-CM

## 2017-11-27 DIAGNOSIS — E11.9 TYPE 2 DIABETES MELLITUS WITHOUT COMPLICATION, WITHOUT LONG-TERM CURRENT USE OF INSULIN (HCC): Primary | ICD-10-CM

## 2017-11-27 DIAGNOSIS — E66.01 MORBID OBESITY (HCC): ICD-10-CM

## 2017-11-27 PROCEDURE — 99214 OFFICE O/P EST MOD 30 MIN: CPT | Performed by: INTERNAL MEDICINE

## 2017-11-27 NOTE — PROGRESS NOTES
52 y.o.    Patient Care Team:  Lauri Xie MD as PCP - General (Internal Medicine)    Chief Complaint:    4 Month Follow Up/ Type 2 Diabetes Mellitus   Subjective     HPI    Lauri Garcia 52 y.o. presents with Type 2 dm as a follow up patient. Referred by Miss. Funez.      Type 2 diabetes mellitus-diagnosed about 7 years ago.  Currently on oral hypoglycemic agents. On metformin 1000 mg po bid, Januvia 100 mg po daily, Invokana 100 mg po daily.   Tolerating the medication with no side effects.     Complains of diarrhea, stomach cramps, urinary tract infections.  Checks blood sugars at least 1-2 times a day.  Fasting blood sugars around 120-1 30 mg/dL and pre-meal blood sugars around 1 40 mg/dL.  Improved urination and thirst.  No blood sugar less than 60 in the last one month, does have hypoglycemic awareness.  Highest blood sugar in the last 1 month was 1 50 mg/dL.  Last eye exam was in July 2017 and no history of diabetic retinopathy.  No complaints of tingling or numbness in his bilateral feet and no ulcers or amputations of his feet.  No history of CAD, CK D, CVA per patient.  Patient is physically active,and asked why attacks or steroid usage.  He reports that his physical activity is limited at this time of the year because of the concern for asthma exacerbation.  Impression follow diabetic diet as much as he can.  On Ace inhibitor    MiV9i-2.6%.    Hyperlipidemia  Patient does not want to be started on statins.  Currently on fish oil.    History of kidney stones  Managed by the urologist.    Morbid obesity  Patient watches calorie counting and monitors the number of steps that he takes on his fit bit.  His weight more or less has stabilized.    Interval History      The following portions of the patient's history were reviewed and updated as appropriate: allergies, current medications, past family history, past medical history, past social history, past surgical history and problem list.    Past Medical  History:   Diagnosis Date   • Allergic rhinitis     controlled on meds, followed by allergist (Dr. Kuo)   • Allergic rhinitis    • Asthma     controlled on meds, followed by allergist (Dr. Kuo)   • Diabetes mellitus, type II    • Hypertension    • Kidney stone     on allopurinol and klor-con (Dr. Peterson)   • Sleep apnea     on cpap, followed by sleep medicine   • Sleep apnea      Family History   Problem Relation Age of Onset   • Diabetes Mother    • Hypertension Mother    • Coronary artery disease Father      (60's)     Social History     Social History   • Marital status:      Spouse name: N/A   • Number of children: N/A   • Years of education: N/A     Occupational History   • Not on file.     Social History Main Topics   • Smoking status: Never Smoker   • Smokeless tobacco: Never Used   • Alcohol use No   • Drug use: No   • Sexual activity: Defer     Other Topics Concern   • Not on file     Social History Narrative     No Known Allergies    Current Outpatient Prescriptions:   •  allopurinol (ZYLOPRIM) 300 MG tablet, , Disp: , Rfl:   •  Canagliflozin (INVOKANA) 100 MG tablet, Take 100 mg by mouth Daily., Disp: 90 tablet, Rfl: 3  •  cetirizine (ZyrTEC) 10 MG tablet, , Disp: , Rfl:   •  COCONUT OIL PO, Take  by mouth., Disp: , Rfl:   •  Flaxseed, Linseed, (FLAX SEEDS PO), Take  by mouth., Disp: , Rfl:   •  Fluticasone Furoate-Vilanterol (BREO ELLIPTA) 100-25 MCG/INH aerosol powder , Inhale 1 puff Daily., Disp: 1 each, Rfl: 11  •  glucose blood (ACCU-CHEK SMARTVIEW) test strip, Use to check blood sugar 2 times daily, Disp: 200 each, Rfl: 3  •  KLOR-CON/EF 25 MEQ disintegrating tablet, TAKE 2 TABLETS BY MOUTH EVERY DAY, Disp: , Rfl: 4  •  lisinopril-hydrochlorothiazide (ZESTORETIC) 20-12.5 MG per tablet, Take 2 tablets by mouth Daily., Disp: 90 tablet, Rfl: 3  •  melatonin 5 MG tablet tablet, Take 5 mg by mouth nightly., Disp: , Rfl:   •  metFORMIN (GLUCOPHAGE) 1000 MG tablet, Take 1 tablet by mouth 2 (Two)  "Times a Day With Meals., Disp: 180 tablet, Rfl: 3  •  montelukast (SINGULAIR) 10 MG tablet, Take 1 tablet by mouth Every Night., Disp: 30 tablet, Rfl: 11  •  Multiple Vitamins-Minerals (MULTIVITAL-M PO), Take  by mouth., Disp: , Rfl:   •  Omega-3 Fatty Acids (FISH OIL) 1000 MG capsule capsule, Take  by mouth Daily With Breakfast., Disp: , Rfl:   •  SITagliptin (JANUVIA) 100 MG tablet, Take 1 tablet by mouth Daily., Disp: 90 tablet, Rfl: 3  •  VENTOLIN  (90 BASE) MCG/ACT inhaler, INHALE 2 PUFFS BY MOUTH EVERY 4 HOURS AS NEEDED, Disp: 1 inhaler, Rfl: 3  •  ACCU-CHEK FASTCLIX LANCETS misc, Use to check blood sugar 2 times daily, Disp: 200 each, Rfl: 3  •  lisinopril (PRINIVIL,ZESTRIL) 20 MG tablet, Take 1 tablet by mouth Daily., Disp: 30 tablet, Rfl: 6        Review of Systems   Constitutional: Negative for fever.   HENT: Negative for facial swelling, nosebleeds, trouble swallowing and voice change.    Eyes: Negative for pain and redness.   Respiratory: Positive for shortness of breath and wheezing.    Cardiovascular: Negative for palpitations and leg swelling.   Gastrointestinal: Negative for abdominal pain, diarrhea and vomiting.   Endocrine: Negative for polydipsia and polyuria.   Genitourinary: Negative for decreased urine volume and frequency.   Musculoskeletal: Negative for joint swelling and neck pain.   Skin: Negative for rash.   Allergic/Immunologic: Negative for immunocompromised state.   Neurological: Negative for seizures, facial asymmetry and numbness.   Hematological: Does not bruise/bleed easily.   Psychiatric/Behavioral: Negative for agitation and confusion.       Objective       Vitals:    11/27/17 1116   BP: 122/70   Pulse: 96   SpO2: 95%   Weight: (!) 336 lb (152 kg)   Height: 71\" (180.3 cm)     Body mass index is 46.86 kg/(m^2).      Physical Exam   Gen exam - alert and oriented x 3, obese male, not in distress.   HEENT - Acanthosis nigricans. Thyroid palpable.   Resp - Clear to auscultation. "   CVS - S1,S2 heard and no murmurs.   Abd - Non tender, BS heard.   Ext - No edema and intact pin prick and proprioception.     Results Review:     I reviewed the patient's new clinical results.    Medical records reviewed  Summary: done      Lab on 11/13/2017   Component Date Value Ref Range Status   • Hemoglobin A1C 11/13/2017 6.60* 4.80 - 5.60 % Final    Comment: Hemoglobin A1C Ranges:  Increased Risk for Diabetes  5.7% to 6.4%  Diabetes                     >= 6.5%  Diabetic Goal                < 7.0%     • Glucose 11/13/2017 108* 65 - 99 mg/dL Final   • BUN 11/13/2017 22* 6 - 20 mg/dL Final   • Creatinine 11/13/2017 1.05  0.76 - 1.27 mg/dL Final   • eGFR Non  Am 11/13/2017 75  >60 mL/min/1.73 Final   • eGFR African Am 11/13/2017 91  >60 mL/min/1.73 Final   • BUN/Creatinine Ratio 11/13/2017 21.0  7.0 - 25.0 Final   • Sodium 11/13/2017 142  136 - 145 mmol/L Final   • Potassium 11/13/2017 4.3  3.5 - 5.2 mmol/L Final   • Chloride 11/13/2017 98  98 - 107 mmol/L Final   • Total CO2 11/13/2017 31.4* 22.0 - 29.0 mmol/L Final   • Calcium 11/13/2017 10.5  8.6 - 10.5 mg/dL Final   • Total Protein 11/13/2017 7.3  6.0 - 8.5 g/dL Final   • Albumin 11/13/2017 4.40  3.50 - 5.20 g/dL Final   • Globulin 11/13/2017 2.9  gm/dL Final   • A/G Ratio 11/13/2017 1.5  g/dL Final   • Total Bilirubin 11/13/2017 0.4  0.1 - 1.2 mg/dL Final   • Alkaline Phosphatase 11/13/2017 80  39 - 117 U/L Final   • AST (SGOT) 11/13/2017 23  1 - 40 U/L Final   • ALT (SGPT) 11/13/2017 33  1 - 41 U/L Final   • Total Cholesterol 11/13/2017 158  0 - 200 mg/dL Final   • Triglycerides 11/13/2017 175* 0 - 150 mg/dL Final   • HDL Cholesterol 11/13/2017 33* 40 - 60 mg/dL Final   • VLDL Cholesterol 11/13/2017 35  5 - 40 mg/dL Final   • LDL Cholesterol  11/13/2017 90  0 - 100 mg/dL Final   • Creatinine, Urine 11/13/2017 126.1  Not Estab. mg/dL Final   • Microalbumin, Urine 11/13/2017 4.2  Not Estab. ug/mL Final   • Microalbumin/Creatinine Ratio 11/13/2017 3.3   0.0 - 30.0 mg/g creat Final   • TSH 11/13/2017 3.410  0.270 - 4.200 mIU/mL Final   • Vitamin B-12 11/13/2017 318  211 - 946 pg/mL Final   • Folate 11/13/2017 10.77  4.78 - 24.20 ng/mL Final   • 25 Hydroxy, Vitamin D 11/13/2017 39.4  30.0 - 100.0 ng/mL Final    Comment: Reference Range for Total Vitamin D 25(OH)  Deficiency    <20.0 ng/mL  Insufficiency 21-29 ng/mL  Sufficiency    ng/mL  Toxicity      >100 ng/ml          Lab Results   Component Value Date    HGBA1C 6.60 (H) 11/13/2017    HGBA1C 6.80 (H) 07/10/2017    HGBA1C 7.00 (H) 04/03/2017     Lab Results   Component Value Date    MICROALBUR 4.2 11/13/2017    CREATININE 1.05 11/13/2017     Imaging Results (most recent)     None                Assessment and Plan:    Lauri was seen today for diabetes.    Diagnoses and all orders for this visit:    Type 2 diabetes mellitus without complication, without long-term current use of insulin  -     Hemoglobin A1c; Future  -     Basic Metabolic Panel; Future  -     Microalbumin / Creatinine Urine Ratio - Urine, Clean Catch; Future  -     Lipid Panel; Future  -     Vitamin B12 & Folate; Future  -     TSH; Future  -     Vitamin D 25 Hydroxy; Future    Hyperlipidemia, unspecified hyperlipidemia type  -     Hemoglobin A1c; Future  -     Basic Metabolic Panel; Future  -     Microalbumin / Creatinine Urine Ratio - Urine, Clean Catch; Future  -     Lipid Panel; Future  -     Vitamin B12 & Folate; Future  -     TSH; Future  -     Vitamin D 25 Hydroxy; Future    Morbid obesity  -     Hemoglobin A1c; Future  -     Basic Metabolic Panel; Future  -     Microalbumin / Creatinine Urine Ratio - Urine, Clean Catch; Future  -     Lipid Panel; Future  -     Vitamin B12 & Folate; Future  -     TSH; Future  -     Vitamin D 25 Hydroxy; Future    Type II DM  Well controlled  Continue metformin thousand milligrams twice daily  Continue Januvia 100 mg oral daily  Continue invokana 100 mg oral daily.    Advised the patient to keep himself  hydrated with water.    Hyperlipidemia  Patient refused to be started on statins.  Continue fish oil for now.    Morbid obesity  Discussed with the patient options of gastric sleeve surgery/LAP-BAND surgery.  Patient wants to continue to do what he is doing in terms of calorie counting and exercising.    14 minutes out of 25 minutes face to face spent in counseling the patient extensively on medication changes and treatment plan.

## 2017-12-22 RX ORDER — CANAGLIFLOZIN 100 MG/1
TABLET, FILM COATED ORAL
Qty: 90 TABLET | Refills: 3 | Status: SHIPPED | OUTPATIENT
Start: 2017-12-22 | End: 2018-05-23

## 2017-12-22 RX ORDER — SITAGLIPTIN 100 MG/1
TABLET, FILM COATED ORAL
Qty: 90 TABLET | Refills: 3 | Status: SHIPPED | OUTPATIENT
Start: 2017-12-22 | End: 2019-01-15 | Stop reason: SDUPTHER

## 2018-01-30 DIAGNOSIS — I10 ESSENTIAL HYPERTENSION: ICD-10-CM

## 2018-01-30 RX ORDER — LISINOPRIL AND HYDROCHLOROTHIAZIDE 20; 12.5 MG/1; MG/1
TABLET ORAL
Qty: 180 TABLET | Refills: 3 | OUTPATIENT
Start: 2018-01-30

## 2018-03-28 DIAGNOSIS — J45.40 MODERATE PERSISTENT ASTHMA WITHOUT COMPLICATION: ICD-10-CM

## 2018-03-28 RX ORDER — MONTELUKAST SODIUM 10 MG/1
TABLET ORAL
Qty: 30 TABLET | Refills: 0 | Status: SHIPPED | OUTPATIENT
Start: 2018-03-28 | End: 2019-04-20

## 2018-04-16 ENCOUNTER — TELEPHONE (OUTPATIENT)
Dept: ENDOCRINOLOGY | Age: 53
End: 2018-04-16

## 2018-04-16 NOTE — TELEPHONE ENCOUNTER
Spoke with patient to let him know that he should hold the invokana  And Januvia  But take the metformin in the am and not in the pm patient voice understanding      ----- Message from Lesley Jimenez sent at 4/13/2018 12:36 PM EDT -----  Contact: PATIENT     MESSAGE FROM PATIENT     MESSAGE/ISSUE:     PATIENT HAS CALLED IN REGARDS TO HIS COMING UP COLONOSCOPY    HE IS HAVING 05/03/2018.     HE IS TAKING THE CLEAR LIQUID BEFORE HE HAS A FEW  FOLLOW UP QUESTIONS IN REGARDS TO THIS.      CALL BACK NUMBER  :  [phone]

## 2018-04-26 ENCOUNTER — LAB (OUTPATIENT)
Dept: ENDOCRINOLOGY | Age: 53
End: 2018-04-26

## 2018-04-26 DIAGNOSIS — E78.5 HYPERLIPIDEMIA, UNSPECIFIED HYPERLIPIDEMIA TYPE: ICD-10-CM

## 2018-04-26 DIAGNOSIS — E11.9 TYPE 2 DIABETES MELLITUS WITHOUT COMPLICATION, WITHOUT LONG-TERM CURRENT USE OF INSULIN (HCC): ICD-10-CM

## 2018-04-26 DIAGNOSIS — E66.01 MORBID OBESITY (HCC): ICD-10-CM

## 2018-04-27 LAB
25(OH)D3+25(OH)D2 SERPL-MCNC: 38.7 NG/ML (ref 30–100)
ALBUMIN/CREAT UR: <7.8 MG/G CREAT (ref 0–30)
BUN SERPL-MCNC: 16 MG/DL (ref 6–20)
BUN/CREAT SERPL: 15.8 (ref 7–25)
CALCIUM SERPL-MCNC: 10.2 MG/DL (ref 8.6–10.5)
CHLORIDE SERPL-SCNC: 97 MMOL/L (ref 98–107)
CHOLEST SERPL-MCNC: 151 MG/DL (ref 0–200)
CO2 SERPL-SCNC: 27.5 MMOL/L (ref 22–29)
CREAT SERPL-MCNC: 1.01 MG/DL (ref 0.76–1.27)
CREAT UR-MCNC: 38.4 MG/DL
FOLATE SERPL-MCNC: 13.03 NG/ML (ref 4.78–24.2)
GFR SERPLBLD CREATININE-BSD FMLA CKD-EPI: 78 ML/MIN/1.73
GFR SERPLBLD CREATININE-BSD FMLA CKD-EPI: 94 ML/MIN/1.73
GLUCOSE SERPL-MCNC: 137 MG/DL (ref 65–99)
HBA1C MFR BLD: 7 % (ref 4.8–5.6)
HDLC SERPL-MCNC: 32 MG/DL (ref 40–60)
INTERPRETATION: NORMAL
LDLC SERPL CALC-MCNC: 80 MG/DL (ref 0–100)
Lab: NORMAL
MICROALBUMIN UR-MCNC: <3 UG/ML
POTASSIUM SERPL-SCNC: 4.5 MMOL/L (ref 3.5–5.2)
SODIUM SERPL-SCNC: 138 MMOL/L (ref 136–145)
TRIGL SERPL-MCNC: 195 MG/DL (ref 0–150)
TSH SERPL DL<=0.005 MIU/L-ACNC: 2.14 MIU/ML (ref 0.27–4.2)
VIT B12 SERPL-MCNC: 424 PG/ML (ref 211–946)
VLDLC SERPL CALC-MCNC: 39 MG/DL (ref 5–40)

## 2018-05-03 ENCOUNTER — ON CAMPUS - OUTPATIENT (OUTPATIENT)
Dept: URBAN - METROPOLITAN AREA HOSPITAL 108 | Facility: HOSPITAL | Age: 53
End: 2018-05-03
Payer: COMMERCIAL

## 2018-05-03 DIAGNOSIS — K57.30 DIVERTICULOSIS OF LARGE INTESTINE WITHOUT PERFORATION OR ABS: ICD-10-CM

## 2018-05-03 DIAGNOSIS — Z12.11 ENCOUNTER FOR SCREENING FOR MALIGNANT NEOPLASM OF COLON: ICD-10-CM

## 2018-05-03 DIAGNOSIS — K64.8 OTHER HEMORRHOIDS: ICD-10-CM

## 2018-05-03 PROCEDURE — 45378 DIAGNOSTIC COLONOSCOPY: CPT | Performed by: INTERNAL MEDICINE

## 2018-05-23 ENCOUNTER — PRIOR AUTHORIZATION (OUTPATIENT)
Dept: ENDOCRINOLOGY | Age: 53
End: 2018-05-23

## 2018-05-23 ENCOUNTER — OFFICE VISIT (OUTPATIENT)
Dept: ENDOCRINOLOGY | Age: 53
End: 2018-05-23

## 2018-05-23 VITALS
HEART RATE: 102 BPM | BODY MASS INDEX: 44.1 KG/M2 | WEIGHT: 315 LBS | HEIGHT: 71 IN | DIASTOLIC BLOOD PRESSURE: 88 MMHG | OXYGEN SATURATION: 97 % | SYSTOLIC BLOOD PRESSURE: 128 MMHG

## 2018-05-23 DIAGNOSIS — E78.1 HYPERTRIGLYCERIDEMIA: ICD-10-CM

## 2018-05-23 DIAGNOSIS — E66.01 MORBID OBESITY (HCC): ICD-10-CM

## 2018-05-23 DIAGNOSIS — E11.9 TYPE 2 DIABETES MELLITUS WITHOUT COMPLICATION, WITHOUT LONG-TERM CURRENT USE OF INSULIN (HCC): Primary | ICD-10-CM

## 2018-05-23 PROCEDURE — 99214 OFFICE O/P EST MOD 30 MIN: CPT | Performed by: INTERNAL MEDICINE

## 2018-05-23 RX ORDER — BUDESONIDE AND FORMOTEROL FUMARATE DIHYDRATE 160; 4.5 UG/1; UG/1
2 AEROSOL RESPIRATORY (INHALATION)
COMMUNITY
Start: 2018-05-04 | End: 2022-12-27

## 2018-05-23 RX ORDER — HEPATITIS A VACCINE 1440 [IU]/ML
INJECTION, SUSPENSION INTRAMUSCULAR
COMMUNITY
Start: 2018-04-28 | End: 2019-04-20

## 2018-05-23 RX ORDER — AMPICILLIN TRIHYDRATE 250 MG
500 CAPSULE ORAL DAILY
COMMUNITY
End: 2019-05-10

## 2018-05-23 NOTE — PROGRESS NOTES
53 y.o.    Patient Care Team:  Lauri Xie MD as PCP - General (Internal Medicine)    Chief Complaint:   6 month follow up /type 2 diabetes mellitus     Subjective     HPI       Lauri Garcia 53 y.o. presents with Type 2 dm as a follow up patient. Referred by Miss. Funez.      Type 2 diabetes mellitus-diagnosed about 7 years ago.  Today in the clinic he reports that he is on metformin 1000 mg po bid, Januvia 100 mg po dialy, Invokana 100 mg po daily.   Tolerating these medications with no issues.   Pt reports that due to the allergies his asthma is worse and is currently him on helping with nasal inhalers.   Checking Bg once day. FBG -   No increased urination or thirst. No nausea or vomiting  No bg less than 60 mg/dl in the last 1 month, does have hypoglycemic awareness. Highest BG in the last 1 month was 147 mg/dl.   Last exam was a month ago, no dm retinopathy.   No hx of dm neuropathy. No ulcers or wounds on his feet.   No hx of cad, ckd, no cva.     Patient is physically active,and asked why attacks or steroid usage.  He reports that his physical activity is limited at this time of the year because of the concern for asthma exacerbation.  Impression follow diabetic diet as much as he can.  On Ace inhibitor     MzI9b-7.6%.     Hyperlipidemia  Patient does not want to be started on statins.  Currently on fish oil.     History of kidney stones  Managed by the urologist.     Morbid obesity  Patient watches calorie counting and monitors the number of steps that he takes on his fit bit.  His weight more or less has stabilized.     Reviewed primary care physician's/consulting physician documentation and lab results :     Interval History      The following portions of the patient's history were reviewed and updated as appropriate: allergies, current medications, past family history, past medical history, past social history, past surgical history and problem list.    Past Medical History:   Diagnosis Date   •  Allergic rhinitis     controlled on meds, followed by allergist (Dr. Kuo)   • Allergic rhinitis    • Asthma     controlled on meds, followed by allergist (Dr. Kuo)   • Diabetes mellitus, type II    • Hypertension    • Kidney stone     on allopurinol and klor-con (Dr. Peterson)   • Sleep apnea     on cpap, followed by sleep medicine   • Sleep apnea      Family History   Problem Relation Age of Onset   • Diabetes Mother    • Hypertension Mother    • Coronary artery disease Father         (60's)     Social History     Social History   • Marital status:      Spouse name: N/A   • Number of children: N/A   • Years of education: N/A     Occupational History   • Not on file.     Social History Main Topics   • Smoking status: Never Smoker   • Smokeless tobacco: Never Used   • Alcohol use No   • Drug use: No   • Sexual activity: Defer     Other Topics Concern   • Not on file     Social History Narrative   • No narrative on file     No Known Allergies    Current Outpatient Prescriptions:   •  ACCU-CHEK FASTCLIX LANCETS misc, Use to check blood sugar 2 times daily, Disp: 200 each, Rfl: 3  •  allopurinol (ZYLOPRIM) 300 MG tablet, , Disp: , Rfl:   •  Fluticasone Furoate-Vilanterol (BREO ELLIPTA) 100-25 MCG/INH aerosol powder , Inhale 1 puff Daily., Disp: 1 each, Rfl: 11  •  JANUVIA 100 MG tablet, TAKE ONE TABLET BY MOUTH ONCE DAILY, Disp: 90 tablet, Rfl: 3  •  lisinopril (PRINIVIL,ZESTRIL) 20 MG tablet, Take 1 tablet by mouth Daily., Disp: 30 tablet, Rfl: 6  •  lisinopril-hydrochlorothiazide (ZESTORETIC) 20-12.5 MG per tablet, Take 2 tablets by mouth Daily., Disp: 90 tablet, Rfl: 3  •  metFORMIN (GLUCOPHAGE) 1000 MG tablet, TAKE ONE TABLET BY MOUTH 2 TIMES A DAY WITH MEALS, Disp: 180 tablet, Rfl: 3  •  montelukast (SINGULAIR) 10 MG tablet, TAKE ONE TABLET BY MOUTH EVERY NIGHT AT BEDTIME, Disp: 30 tablet, Rfl: 0  •  Multiple Vitamins-Minerals (MULTIVITAL-M PO), Take  by mouth., Disp: , Rfl:   •  Omega-3 Fatty Acids (FISH  "OIL) 1000 MG capsule capsule, Take  by mouth Daily With Breakfast., Disp: , Rfl:   •  SYMBICORT 160-4.5 MCG/ACT inhaler, , Disp: , Rfl:   •  VENTOLIN  (90 BASE) MCG/ACT inhaler, INHALE 2 PUFFS BY MOUTH EVERY 4 HOURS AS NEEDED, Disp: 1 inhaler, Rfl: 3  •  Canagliflozin (INVOKANA) 100 MG tablet, Take 1 1/2 tab oral daily., Disp: 145 tablet, Rfl: 3  •  cetirizine (ZyrTEC) 10 MG tablet, , Disp: , Rfl:   •  Cinnamon 500 MG capsule, Take 500 mg by mouth., Disp: , Rfl:   •  COCONUT OIL PO, Take  by mouth., Disp: , Rfl:   •  Flaxseed, Linseed, (FLAX SEEDS PO), Take  by mouth., Disp: , Rfl:   •  glucose blood (ACCU-CHEK SMARTVIEW) test strip, Use to check blood sugar 2 times daily, Disp: 200 each, Rfl: 3  •  HAVRIX 1440 EL U/ML vaccine, , Disp: , Rfl:   •  KLOR-CON/EF 25 MEQ disintegrating tablet, TAKE 2 TABLETS BY MOUTH EVERY DAY, Disp: , Rfl: 4  •  melatonin 5 MG tablet tablet, Take 5 mg by mouth nightly., Disp: , Rfl:         Review of Systems   Constitutional: Negative for appetite change, fatigue and fever.   Eyes: Negative for visual disturbance.   Respiratory: Positive for shortness of breath.    Cardiovascular: Negative for palpitations and leg swelling.   Gastrointestinal: Negative for abdominal pain and vomiting.   Endocrine: Negative for polydipsia and polyuria.   Musculoskeletal: Positive for joint swelling. Negative for neck pain.   Skin: Negative for rash.   Neurological: Negative for weakness and numbness.   Psychiatric/Behavioral: Negative for behavioral problems.       Objective       Vitals:    05/23/18 1111   BP: 128/88   Pulse: 102   SpO2: 97%   Weight: (!) 159 kg (349 lb 12.8 oz)   Height: 180.3 cm (71\")     Body mass index is 48.79 kg/m².      Physical Exam   Constitutional: He is oriented to person, place, and time. He appears well-nourished.   obese   HENT:   Head: Normocephalic and atraumatic.   Wide neck   Eyes: Conjunctivae and EOM are normal.   Neck: Normal range of motion. Neck supple. " Carotid bruit is not present. No thyromegaly present.   Acanthosis nigricans   Cardiovascular: Normal rate and normal heart sounds.    Pulmonary/Chest: Effort normal and breath sounds normal. No stridor. No respiratory distress.   Abdominal: Soft. Bowel sounds are normal. He exhibits no distension. There is no tenderness.   Central obesity   Musculoskeletal: He exhibits no edema or tenderness.   Neurological: He is alert and oriented to person, place, and time.   Skin: Skin is warm and dry.   Psychiatric: He has a normal mood and affect. His behavior is normal.   Vitals reviewed.    Results Review:     I reviewed the patient's new clinical results.    Medical records reviewed  Summary: done      Lab on 04/26/2018   Component Date Value Ref Range Status   • Hemoglobin A1C 04/26/2018 7.00* 4.80 - 5.60 % Final    Comment: Hemoglobin A1C Ranges:  Increased Risk for Diabetes  5.7% to 6.4%  Diabetes                     >= 6.5%  Diabetic Goal                < 7.0%     • Glucose 04/26/2018 137* 65 - 99 mg/dL Final   • BUN 04/26/2018 16  6 - 20 mg/dL Final   • Creatinine 04/26/2018 1.01  0.76 - 1.27 mg/dL Final   • eGFR Non African Am 04/26/2018 78  >60 mL/min/1.73 Final   • eGFR African Am 04/26/2018 94  >60 mL/min/1.73 Final   • BUN/Creatinine Ratio 04/26/2018 15.8  7.0 - 25.0 Final   • Sodium 04/26/2018 138  136 - 145 mmol/L Final   • Potassium 04/26/2018 4.5  3.5 - 5.2 mmol/L Final   • Chloride 04/26/2018 97* 98 - 107 mmol/L Final   • Total CO2 04/26/2018 27.5  22.0 - 29.0 mmol/L Final   • Calcium 04/26/2018 10.2  8.6 - 10.5 mg/dL Final   • Creatinine, Urine 04/26/2018 38.4  Not Estab. mg/dL Final   • Microalbumin, Urine 04/26/2018 <3.0  Not Estab. ug/mL Final   • Microalbumin/Creatinine Ratio 04/26/2018 <7.8  0.0 - 30.0 mg/g creat Final   • Total Cholesterol 04/26/2018 151  0 - 200 mg/dL Final   • Triglycerides 04/26/2018 195* 0 - 150 mg/dL Final   • HDL Cholesterol 04/26/2018 32* 40 - 60 mg/dL Final   • VLDL  "Cholesterol 04/26/2018 39  5 - 40 mg/dL Final   • LDL Cholesterol  04/26/2018 80  0 - 100 mg/dL Final   • Vitamin B-12 04/26/2018 424  211 - 946 pg/mL Final   • Folate 04/26/2018 13.03  4.78 - 24.20 ng/mL Final   • TSH 04/26/2018 2.140  0.270 - 4.200 mIU/mL Final   • 25 Hydroxy, Vitamin D 04/26/2018 38.7  30.0 - 100.0 ng/ml Final    Comment: Reference Range for Total Vitamin D 25(OH)  Deficiency    <20.0 ng/mL  Insufficiency 21-29 ng/mL  Sufficiency    ng/mL  Toxicity      >100 ng/ml        • Interpretation 04/26/2018 Note   Final    Supplemental report is available.   • PDF Image 04/26/2018 Not applicable   Final     Lab Results   Component Value Date    HGBA1C 7.00 (H) 04/26/2018    HGBA1C 6.60 (H) 11/13/2017    HGBA1C 6.80 (H) 07/10/2017     Lab Results   Component Value Date    MICROALBUR <3.0 04/26/2018    CREATININE 1.01 04/26/2018     Imaging Results (most recent)     None                Assessment and Plan:    Lauri was seen today for diabetes.    Diagnoses and all orders for this visit:    Type 2 diabetes mellitus without complication, without long-term current use of insulin  -     Hemoglobin A1c; Future    Morbid obesity    Hypertriglyceridemia    Other orders  -     Canagliflozin (INVOKANA) 100 MG tablet; Take 1 1/2 tab oral daily.    Type 2 diabetes mellitus - uncontrolled  HbA1c worse since last visit.   Continue the current oral hypoglycemic agents plus increase the dose of invokana  Due to his lack of activity-due to asthma exacerbation would consider increasing the dosage of invokana to 150 mg oral daily.    Hyperlipidemia  Continue fish oil.    Morbid obesity  Discussed about bariatric surgery options with the patient which he refused.      Reviewed Lab results with the patient.             Rakesh Weir MD  05/23/18    [facility] Dragon / transcription disclaimer:     \"Dictated utilizing Dragon dictation\".  "

## 2018-05-23 NOTE — TELEPHONE ENCOUNTER
PT'S PA FOR INVOKANA WAS SUBMITTED TO [facility] ON 5/23/18 AND WAS APPROVED. PHARMACY WAS NOTIFIED.

## 2018-08-21 ENCOUNTER — RESULTS ENCOUNTER (OUTPATIENT)
Dept: ENDOCRINOLOGY | Age: 53
End: 2018-08-21

## 2018-08-21 DIAGNOSIS — E11.9 TYPE 2 DIABETES MELLITUS WITHOUT COMPLICATION, WITHOUT LONG-TERM CURRENT USE OF INSULIN (HCC): ICD-10-CM

## 2018-08-24 LAB — HBA1C MFR BLD: 6.77 % (ref 4.8–5.6)

## 2018-08-29 RX ORDER — BLOOD SUGAR DIAGNOSTIC
STRIP MISCELLANEOUS
Qty: 200 EACH | Refills: 3 | Status: SHIPPED | OUTPATIENT
Start: 2018-08-29 | End: 2019-09-16 | Stop reason: SDUPTHER

## 2018-08-29 RX ORDER — LANCETS
EACH MISCELLANEOUS
Qty: 200 EACH | Refills: 3 | Status: SHIPPED | OUTPATIENT
Start: 2018-08-29 | End: 2019-09-16 | Stop reason: SDUPTHER

## 2018-11-05 ENCOUNTER — OFFICE VISIT (OUTPATIENT)
Dept: ENDOCRINOLOGY | Age: 53
End: 2018-11-05

## 2018-11-05 VITALS
HEART RATE: 97 BPM | BODY MASS INDEX: 42.66 KG/M2 | HEIGHT: 72 IN | DIASTOLIC BLOOD PRESSURE: 70 MMHG | OXYGEN SATURATION: 97 % | SYSTOLIC BLOOD PRESSURE: 120 MMHG | WEIGHT: 315 LBS

## 2018-11-05 DIAGNOSIS — E11.9 TYPE 2 DIABETES MELLITUS WITHOUT COMPLICATION, WITHOUT LONG-TERM CURRENT USE OF INSULIN (HCC): Primary | ICD-10-CM

## 2018-11-05 DIAGNOSIS — E66.01 MORBID OBESITY (HCC): ICD-10-CM

## 2018-11-05 DIAGNOSIS — E78.5 HYPERLIPIDEMIA, UNSPECIFIED HYPERLIPIDEMIA TYPE: ICD-10-CM

## 2018-11-05 LAB
BUN SERPL-MCNC: 24 MG/DL (ref 6–20)
BUN/CREAT SERPL: 23.3 (ref 7–25)
CALCIUM SERPL-MCNC: 10.4 MG/DL (ref 8.6–10.5)
CHLORIDE SERPL-SCNC: 97 MMOL/L (ref 98–107)
CHOLEST SERPL-MCNC: 157 MG/DL (ref 0–200)
CO2 SERPL-SCNC: 31.1 MMOL/L (ref 22–29)
CREAT SERPL-MCNC: 1.03 MG/DL (ref 0.76–1.27)
GLUCOSE SERPL-MCNC: 133 MG/DL (ref 65–99)
HBA1C MFR BLD: 6.92 % (ref 4.8–5.6)
HDLC SERPL-MCNC: 36 MG/DL (ref 40–60)
INTERPRETATION: NORMAL
LDLC SERPL CALC-MCNC: 83 MG/DL (ref 0–100)
Lab: NORMAL
POTASSIUM SERPL-SCNC: 4.7 MMOL/L (ref 3.5–5.2)
SODIUM SERPL-SCNC: 141 MMOL/L (ref 136–145)
TRIGL SERPL-MCNC: 190 MG/DL (ref 0–150)
TSH SERPL DL<=0.005 MIU/L-ACNC: 2.37 MIU/ML (ref 0.27–4.2)
VLDLC SERPL CALC-MCNC: 38 MG/DL (ref 5–40)

## 2018-11-05 PROCEDURE — 99214 OFFICE O/P EST MOD 30 MIN: CPT | Performed by: INTERNAL MEDICINE

## 2018-11-05 NOTE — PROGRESS NOTES
53 y.o.    Patient Care Team:  Lauri Xie MD as PCP - General (Internal Medicine)    Chief Complaint:    3 MONTH FOLLOW UP/ TYPE 2 DIABETES MELLTIUS  Subjective     HPI    Lauri Garcia 53 y.o. presents with Type 2 dm as a follow up patient. Referred by Miss. Funez.      Type 2 diabetes mellitus-diagnosed about 7 years ago.  Today in the clinic he reports that he is on metformin 1000 mg po bid, Januvia 100 mg po dialy, Invokana 150 mg po daily.   Tolerating these medications with no issues.   Patient does report that this is on his allergies are very worse.  He recently finished a pack of steroid about a month ago.  Checks his blood sugars one to 2 times a day.  Average blood sugars are around 120-1 50 mg/dL.  He did not get his log book or his glucometer for me to review.  No blood sugar less than 60 in the last 1 month according to the patient.  Does have hypoglycemic awareness.  Highest blood sugar was around 1 80 mg/dL.  Last eye examination was about 4 months ago, no history of diabetic retinopathy.  No history of diabetic neuropathy.  No recent hospitalizations.  No history of CAD, CK D, CVA.  He reports that his physical activity is limited at this time of the year because of the concern for asthma exacerbation.  Tries to follow diabetic diet as much as he can.  On Ace inhibitor     [id number]-3.6%.     Hyperlipidemia  Patient does not want to be started on statins.  Currently on fish oil.     History of kidney stones  Managed by the urologist.       Reviewed primary care physician's/consulting physician documentation and lab results :     Interval History      The following portions of the patient's history were reviewed and updated as appropriate: allergies, current medications, past family history, past medical history, past social history, past surgical history and problem list.    Past Medical History:   Diagnosis Date   • Allergic rhinitis     controlled on meds, followed by allergist (Dr. Kuo)   •  Allergic rhinitis    • Asthma     controlled on meds, followed by allergist (Dr. Kuo)   • Diabetes mellitus, type II (CMS/[facility])    • Hypertension    • Kidney stone     on allopurinol and klor-con (Dr. Peterson)   • Sleep apnea     on cpap, followed by sleep medicine   • Sleep apnea      Family History   Problem Relation Age of Onset   • Diabetes Mother    • Hypertension Mother    • Coronary artery disease Father         (60's)     Social History     Social History   • Marital status:      Spouse name: N/A   • Number of children: N/A   • Years of education: N/A     Occupational History   • Not on file.     Social History Main Topics   • Smoking status: Never Smoker   • Smokeless tobacco: Never Used   • Alcohol use No   • Drug use: No   • Sexual activity: Defer     Other Topics Concern   • Not on file     Social History Narrative   • No narrative on file     No Known Allergies    Current Outpatient Prescriptions:   •  ACCU-CHEK FASTCLIX LANCETS misc, USE TO CHECK BLOOD SUGAR TWICE DAILY AND AS NEEDED, Disp: 200 each, Rfl: 3  •  ACCU-CHEK SMARTVIEW test strip, USE AS DIRECTED 2 TIMES A DAY, Disp: 200 each, Rfl: 3  •  allopurinol (ZYLOPRIM) 300 MG tablet, , Disp: , Rfl:   •  Canagliflozin (INVOKANA) 100 MG tablet, Take 1 1/2 tab oral daily., Disp: 145 tablet, Rfl: 3  •  cetirizine (ZyrTEC) 10 MG tablet, , Disp: , Rfl:   •  Cinnamon 500 MG capsule, Take 500 mg by mouth., Disp: , Rfl:   •  COCONUT OIL PO, Take  by mouth., Disp: , Rfl:   •  Flaxseed, Linseed, (FLAX SEEDS PO), Take  by mouth., Disp: , Rfl:   •  HAVRIX 1440 EL U/ML vaccine, , Disp: , Rfl:   •  JANUVIA 100 MG tablet, TAKE ONE TABLET BY MOUTH ONCE DAILY, Disp: 90 tablet, Rfl: 3  •  KLOR-CON/EF 25 MEQ disintegrating tablet, TAKE 2 TABLETS BY MOUTH EVERY DAY, Disp: , Rfl: 4  •  lisinopril-hydrochlorothiazide (ZESTORETIC) 20-12.5 MG per tablet, Take 2 tablets by mouth Daily., Disp: 90 tablet, Rfl: 3  •  melatonin 5 MG tablet tablet, Take 5 mg by mouth  "nightly., Disp: , Rfl:   •  metFORMIN (GLUCOPHAGE) 1000 MG tablet, TAKE ONE TABLET BY MOUTH 2 TIMES A DAY WITH MEALS, Disp: 180 tablet, Rfl: 3  •  montelukast (SINGULAIR) 10 MG tablet, TAKE ONE TABLET BY MOUTH EVERY NIGHT AT BEDTIME, Disp: 30 tablet, Rfl: 0  •  Multiple Vitamins-Minerals (MULTIVITAL-M PO), Take  by mouth., Disp: , Rfl:   •  Omega-3 Fatty Acids (FISH OIL) 1000 MG capsule capsule, Take  by mouth Daily With Breakfast., Disp: , Rfl:   •  SYMBICORT 160-4.5 MCG/ACT inhaler, , Disp: , Rfl:   •  VENTOLIN  (90 BASE) MCG/ACT inhaler, INHALE 2 PUFFS BY MOUTH EVERY 4 HOURS AS NEEDED, Disp: 1 inhaler, Rfl: 3        Review of Systems   Constitutional: Negative for appetite change, fatigue and fever.   Eyes: Negative for visual disturbance.   Respiratory: Positive for shortness of breath.    Cardiovascular: Negative for palpitations and leg swelling.   Gastrointestinal: Negative for abdominal pain and vomiting.   Endocrine: Negative for polydipsia and polyuria.   Musculoskeletal: Negative for joint swelling and neck pain.   Skin: Negative for rash.   Neurological: Negative for weakness and numbness.   Psychiatric/Behavioral: Negative for behavioral problems.       Objective       Vitals:    11/05/18 1103   BP: 120/70   Pulse: 97   SpO2: 97%   Weight: (!) 156 kg (344 lb)   Height: 181.6 cm (71.5\")     Body mass index is 47.31 kg/m².      Physical Exam   Constitutional: He is oriented to person, place, and time. He appears well-nourished.   obese   HENT:   Head: Normocephalic and atraumatic.   Wide neck   Eyes: Conjunctivae and EOM are normal.   Neck: Normal range of motion. Neck supple. Carotid bruit is not present. No thyromegaly present.   Acanthosis nigricans   Cardiovascular: Normal rate and normal heart sounds.    Pulmonary/Chest: Effort normal and breath sounds normal. No stridor. No respiratory distress.   Abdominal: Soft. Bowel sounds are normal. He exhibits no distension. There is no tenderness. "   Central obesity   Musculoskeletal: He exhibits no edema or tenderness.   Neurological: He is alert and oriented to person, place, and time.   Skin: Skin is warm and dry.   Psychiatric: He has a normal mood and affect. His behavior is normal.   Vitals reviewed.    Results Review:     I reviewed the patient's new clinical results and mentioned them above in HPI and in plan as well.    Medical records reviewed  Summary: DONE      Results Encounter on 08/21/2018   Component Date Value Ref Range Status   • Hemoglobin A1C 08/24/2018 6.77* 4.80 - 5.60 % Final    Comment: Hemoglobin A1C Ranges:  Increased Risk for Diabetes  5.7% to 6.4%  Diabetes                     >= 6.5%  Diabetic Goal                < 7.0%       Lab Results   Component Value Date    HGBA1C 6.77 (H) 08/24/2018    HGBA1C 7.00 (H) 04/26/2018    HGBA1C 6.60 (H) 11/13/2017     Lab Results   Component Value Date    MICROALBUR <3.0 04/26/2018    CREATININE 1.01 04/26/2018     Imaging Results (most recent)     None                Assessment and Plan:    Lauri was seen today for diabetes.    Diagnoses and all orders for this visit:    Type 2 diabetes mellitus without complication, without long-term current use of insulin (CMS/[facility])  -     Hemoglobin A1c  -     Basic Metabolic Panel  -     Lipid Panel  -     TSH  -     Basic Metabolic Panel; Future  -     Hemoglobin A1c; Future  -     Lipid Panel; Future  -     TSH; Future  -     Vitamin D 25 Hydroxy; Future  -     Vitamin B12 & Folate; Future  -     T4, Free; Future    Morbid obesity (CMS/[facility])  -     Hemoglobin A1c  -     Basic Metabolic Panel  -     Lipid Panel  -     TSH  -     Basic Metabolic Panel; Future  -     Hemoglobin A1c; Future  -     Lipid Panel; Future  -     TSH; Future  -     Vitamin D 25 Hydroxy; Future  -     Vitamin B12 & Folate; Future  -     T4, Free; Future    Hyperlipidemia, unspecified hyperlipidemia type  -     Hemoglobin A1c  -     Basic Metabolic Panel  -     Lipid Panel  -     TSH  -   "   Basic Metabolic Panel; Future  -     Hemoglobin A1c; Future  -     Lipid Panel; Future  -     TSH; Future  -     Vitamin D 25 Hydroxy; Future  -     Vitamin B12 & Folate; Future  -     T4, Free; Future        Type 2 diabetes mellitus-decently controlled  Continue the current oral hypoglycemic agents  Discussed the side effects and benefits of these medications.    Morbid obesity  Weight has been still worse.  Discussed with the patient about trying to physically active  Discussed with the patient options of.  Tract surgery.    Hyperlipidemia  Continue fish oil for now.    Reviewed Lab results with the patient.             Rakesh Weir MD  11/05/18    EMR Dragon / transcription disclaimer:     \"Dictated utilizing Dragon dictation\".  "

## 2018-11-06 NOTE — PROGRESS NOTES
Mail results to pt, no treatment changes at this time. Hba1c is decent, no changes to meds at this time.

## 2019-01-15 RX ORDER — SITAGLIPTIN 100 MG/1
TABLET, FILM COATED ORAL
Qty: 90 TABLET | Refills: 3 | Status: SHIPPED | OUTPATIENT
Start: 2019-01-15 | End: 2019-04-20

## 2019-04-20 ENCOUNTER — APPOINTMENT (OUTPATIENT)
Dept: CARDIOLOGY | Facility: HOSPITAL | Age: 54
End: 2019-04-20

## 2019-04-20 ENCOUNTER — APPOINTMENT (OUTPATIENT)
Dept: GENERAL RADIOLOGY | Facility: HOSPITAL | Age: 54
End: 2019-04-20

## 2019-04-20 ENCOUNTER — APPOINTMENT (OUTPATIENT)
Dept: CT IMAGING | Facility: HOSPITAL | Age: 54
End: 2019-04-20

## 2019-04-20 ENCOUNTER — HOSPITAL ENCOUNTER (INPATIENT)
Facility: HOSPITAL | Age: 54
LOS: 11 days | Discharge: HOME | End: 2019-05-01
Attending: EMERGENCY MEDICINE | Admitting: THORACIC SURGERY (CARDIOTHORACIC VASCULAR SURGERY)

## 2019-04-20 DIAGNOSIS — J81.0 ACUTE PULMONARY EDEMA (HCC): Primary | ICD-10-CM

## 2019-04-20 DIAGNOSIS — J18.9 COMMUNITY ACQUIRED PNEUMONIA, UNSPECIFIED LATERALITY: ICD-10-CM

## 2019-04-20 DIAGNOSIS — E87.20 LACTIC ACIDOSIS: ICD-10-CM

## 2019-04-20 DIAGNOSIS — Z74.09 IMPAIRED FUNCTIONAL MOBILITY, BALANCE, GAIT, AND ENDURANCE: ICD-10-CM

## 2019-04-20 DIAGNOSIS — I25.10 CORONARY ARTERY DISEASE INVOLVING NATIVE CORONARY ARTERY OF NATIVE HEART WITHOUT ANGINA PECTORIS: ICD-10-CM

## 2019-04-20 DIAGNOSIS — I21.4 NSTEMI (NON-ST ELEVATED MYOCARDIAL INFARCTION) (HCC): ICD-10-CM

## 2019-04-20 DIAGNOSIS — Z95.1 S/P CABG X 4: ICD-10-CM

## 2019-04-20 LAB
ALBUMIN SERPL-MCNC: 4.2 G/DL (ref 3.5–5.2)
ALBUMIN/GLOB SERPL: 1.2 G/DL
ALP SERPL-CCNC: 70 U/L (ref 39–117)
ALT SERPL W P-5'-P-CCNC: 42 U/L (ref 1–41)
ANION GAP SERPL CALCULATED.3IONS-SCNC: 20.7 MMOL/L
APTT PPP: 27.2 SECONDS (ref 22.7–35.4)
AST SERPL-CCNC: 51 U/L (ref 1–40)
B PARAPERT DNA SPEC QL NAA+PROBE: NOT DETECTED
B PERT DNA SPEC QL NAA+PROBE: NOT DETECTED
BASOPHILS # BLD AUTO: 0.04 10*3/MM3 (ref 0–0.2)
BASOPHILS # BLD AUTO: 0.05 10*3/MM3 (ref 0–0.2)
BASOPHILS NFR BLD AUTO: 0.2 % (ref 0–1.5)
BASOPHILS NFR BLD AUTO: 0.3 % (ref 0–1.5)
BH CV XLRA MEAS - DIST GSV CALF DIST LEFT: 0.28 CM
BH CV XLRA MEAS - DIST GSV CALF DIST RIGHT: 0.31 CM
BH CV XLRA MEAS - DIST GSV THIGH DIST LEFT: 0.37 CM
BH CV XLRA MEAS - DIST GSV THIGH DIST RIGHT: 0.43 CM
BH CV XLRA MEAS - GSV ANKLE DIST LEFT: 0.3 CM
BH CV XLRA MEAS - GSV ANKLE DIST RIGHT: 0.3 CM
BH CV XLRA MEAS - GSV KNEE DIST LEFT: 0.33 CM
BH CV XLRA MEAS - GSV KNEE DIST RIGHT: 0.33 CM
BH CV XLRA MEAS - GSV ORIGIN DIST LEFT: 0.77 CM
BH CV XLRA MEAS - GSV ORIGIN DIST RIGHT: 0.42 CM
BH CV XLRA MEAS - MID GSV CALF LEFT: 0.3 CM
BH CV XLRA MEAS - MID GSV CALF RIGHT: 0.24 CM
BH CV XLRA MEAS - MID GSV THIGH  LEFT: 0.36 CM
BH CV XLRA MEAS - MID GSV THIGH  RIGHT: 0.4 CM
BH CV XLRA MEAS - PROX GSV CALF DIST LEFT: 0.23 CM
BH CV XLRA MEAS - PROX GSV THIGH  LEFT: 0.43 CM
BH CV XLRA MEAS - PROX GSV THIGH  RIGHT: 0.57 CM
BILIRUB SERPL-MCNC: 0.5 MG/DL (ref 0.2–1.2)
BUN BLD-MCNC: 21 MG/DL (ref 6–20)
BUN/CREAT SERPL: 18.3 (ref 7–25)
C PNEUM DNA NPH QL NAA+NON-PROBE: NOT DETECTED
CALCIUM SPEC-SCNC: 10.2 MG/DL (ref 8.6–10.5)
CHLORIDE SERPL-SCNC: 96 MMOL/L (ref 98–107)
CO2 SERPL-SCNC: 21.3 MMOL/L (ref 22–29)
CREAT BLD-MCNC: 1.15 MG/DL (ref 0.76–1.27)
D-LACTATE SERPL-SCNC: 1.7 MMOL/L (ref 0.5–2)
D-LACTATE SERPL-SCNC: 2.5 MMOL/L (ref 0.5–2)
DEPRECATED RDW RBC AUTO: 50.2 FL (ref 37–54)
DEPRECATED RDW RBC AUTO: 50.7 FL (ref 37–54)
EOSINOPHIL # BLD AUTO: 0.01 10*3/MM3 (ref 0–0.4)
EOSINOPHIL # BLD AUTO: 0.02 10*3/MM3 (ref 0–0.4)
EOSINOPHIL NFR BLD AUTO: 0.1 % (ref 0.3–6.2)
EOSINOPHIL NFR BLD AUTO: 0.1 % (ref 0.3–6.2)
ERYTHROCYTE [DISTWIDTH] IN BLOOD BY AUTOMATED COUNT: 15.5 % (ref 12.3–15.4)
ERYTHROCYTE [DISTWIDTH] IN BLOOD BY AUTOMATED COUNT: 15.6 % (ref 12.3–15.4)
FLUAV H1 2009 PAND RNA NPH QL NAA+PROBE: NOT DETECTED
FLUAV H1 HA GENE NPH QL NAA+PROBE: NOT DETECTED
FLUAV H3 RNA NPH QL NAA+PROBE: NOT DETECTED
FLUAV SUBTYP SPEC NAA+PROBE: NOT DETECTED
FLUBV RNA ISLT QL NAA+PROBE: NOT DETECTED
GFR SERPL CREATININE-BSD FRML MDRD: 67 ML/MIN/1.73
GLOBULIN UR ELPH-MCNC: 3.4 GM/DL
GLUCOSE BLD-MCNC: 249 MG/DL (ref 65–99)
GLUCOSE BLDC GLUCOMTR-MCNC: 160 MG/DL (ref 70–130)
HADV DNA SPEC NAA+PROBE: NOT DETECTED
HCOV 229E RNA SPEC QL NAA+PROBE: NOT DETECTED
HCOV HKU1 RNA SPEC QL NAA+PROBE: NOT DETECTED
HCOV NL63 RNA SPEC QL NAA+PROBE: NOT DETECTED
HCOV OC43 RNA SPEC QL NAA+PROBE: NOT DETECTED
HCT VFR BLD AUTO: 49.9 % (ref 37.5–51)
HCT VFR BLD AUTO: 50.8 % (ref 37.5–51)
HGB BLD-MCNC: 15.7 G/DL (ref 13–17.7)
HGB BLD-MCNC: 16.1 G/DL (ref 13–17.7)
HMPV RNA NPH QL NAA+NON-PROBE: NOT DETECTED
HOLD SPECIMEN: NORMAL
HPIV1 RNA SPEC QL NAA+PROBE: NOT DETECTED
HPIV2 RNA SPEC QL NAA+PROBE: NOT DETECTED
HPIV3 RNA NPH QL NAA+PROBE: NOT DETECTED
HPIV4 P GENE NPH QL NAA+PROBE: NOT DETECTED
IMM GRANULOCYTES # BLD AUTO: 0.09 10*3/MM3 (ref 0–0.05)
IMM GRANULOCYTES # BLD AUTO: 0.14 10*3/MM3 (ref 0–0.05)
IMM GRANULOCYTES NFR BLD AUTO: 0.6 % (ref 0–0.5)
IMM GRANULOCYTES NFR BLD AUTO: 0.9 % (ref 0–0.5)
INR PPP: 0.95 (ref 0.9–1.1)
LYMPHOCYTES # BLD AUTO: 1.88 10*3/MM3 (ref 0.7–3.1)
LYMPHOCYTES # BLD AUTO: 2.7 10*3/MM3 (ref 0.7–3.1)
LYMPHOCYTES NFR BLD AUTO: 11.7 % (ref 19.6–45.3)
LYMPHOCYTES NFR BLD AUTO: 17.7 % (ref 19.6–45.3)
M PNEUMO IGG SER IA-ACNC: NOT DETECTED
MCH RBC QN AUTO: 28.1 PG (ref 26.6–33)
MCH RBC QN AUTO: 28.3 PG (ref 26.6–33)
MCHC RBC AUTO-ENTMCNC: 31.5 G/DL (ref 31.5–35.7)
MCHC RBC AUTO-ENTMCNC: 31.7 G/DL (ref 31.5–35.7)
MCV RBC AUTO: 88.7 FL (ref 79–97)
MCV RBC AUTO: 90.1 FL (ref 79–97)
MONOCYTES # BLD AUTO: 0.89 10*3/MM3 (ref 0.1–0.9)
MONOCYTES # BLD AUTO: 1.1 10*3/MM3 (ref 0.1–0.9)
MONOCYTES NFR BLD AUTO: 5.5 % (ref 5–12)
MONOCYTES NFR BLD AUTO: 7.2 % (ref 5–12)
NEUTROPHILS # BLD AUTO: 11.28 10*3/MM3 (ref 1.7–7)
NEUTROPHILS # BLD AUTO: 13.14 10*3/MM3 (ref 1.7–7)
NEUTROPHILS NFR BLD AUTO: 74.1 % (ref 42.7–76)
NEUTROPHILS NFR BLD AUTO: 81.6 % (ref 42.7–76)
NRBC BLD AUTO-RTO: 0 /100 WBC (ref 0–0.2)
NRBC BLD AUTO-RTO: 0 /100 WBC (ref 0–0.2)
NT-PROBNP SERPL-MCNC: 683.7 PG/ML (ref 5–900)
PLATELET # BLD AUTO: 297 10*3/MM3 (ref 140–450)
PLATELET # BLD AUTO: 298 10*3/MM3 (ref 140–450)
PMV BLD AUTO: 9 FL (ref 6–12)
PMV BLD AUTO: 9.7 FL (ref 6–12)
POTASSIUM BLD-SCNC: 4.1 MMOL/L (ref 3.5–5.2)
PROCALCITONIN SERPL-MCNC: <0.02 NG/ML (ref 0.1–0.25)
PROT SERPL-MCNC: 7.6 G/DL (ref 6–8.5)
PROTHROMBIN TIME: 12.4 SECONDS (ref 11.7–14.2)
RBC # BLD AUTO: 5.54 10*6/MM3 (ref 4.14–5.8)
RBC # BLD AUTO: 5.73 10*6/MM3 (ref 4.14–5.8)
RHINOVIRUS RNA SPEC NAA+PROBE: NOT DETECTED
RSV RNA NPH QL NAA+NON-PROBE: NOT DETECTED
SODIUM BLD-SCNC: 138 MMOL/L (ref 136–145)
TROPONIN T SERPL-MCNC: 0.22 NG/ML (ref 0–0.03)
TROPONIN T SERPL-MCNC: 0.94 NG/ML (ref 0–0.03)
WBC NRBC COR # BLD: 15.24 10*3/MM3 (ref 3.4–10.8)
WBC NRBC COR # BLD: 16.1 10*3/MM3 (ref 3.4–10.8)

## 2019-04-20 PROCEDURE — 94799 UNLISTED PULMONARY SVC/PX: CPT

## 2019-04-20 PROCEDURE — 25010000002 ONDANSETRON PER 1 MG: Performed by: EMERGENCY MEDICINE

## 2019-04-20 PROCEDURE — 25010000002 ONDANSETRON PER 1 MG: Performed by: INTERNAL MEDICINE

## 2019-04-20 PROCEDURE — 25010000002 HEPARIN (PORCINE) PER 1000 UNITS: Performed by: INTERNAL MEDICINE

## 2019-04-20 PROCEDURE — 94640 AIRWAY INHALATION TREATMENT: CPT

## 2019-04-20 PROCEDURE — 25010000002 HYDROMORPHONE PER 4 MG: Performed by: EMERGENCY MEDICINE

## 2019-04-20 PROCEDURE — 99284 EMERGENCY DEPT VISIT MOD MDM: CPT

## 2019-04-20 PROCEDURE — 93010 ELECTROCARDIOGRAM REPORT: CPT | Performed by: INTERNAL MEDICINE

## 2019-04-20 PROCEDURE — 25010000002 HYDROMORPHONE PER 4 MG: Performed by: NURSE PRACTITIONER

## 2019-04-20 PROCEDURE — 84484 ASSAY OF TROPONIN QUANT: CPT | Performed by: INTERNAL MEDICINE

## 2019-04-20 PROCEDURE — 87581 M.PNEUMON DNA AMP PROBE: CPT | Performed by: PHYSICIAN ASSISTANT

## 2019-04-20 PROCEDURE — 80053 COMPREHEN METABOLIC PANEL: CPT | Performed by: PHYSICIAN ASSISTANT

## 2019-04-20 PROCEDURE — 84145 PROCALCITONIN (PCT): CPT | Performed by: PHYSICIAN ASSISTANT

## 2019-04-20 PROCEDURE — 93005 ELECTROCARDIOGRAM TRACING: CPT

## 2019-04-20 PROCEDURE — 85730 THROMBOPLASTIN TIME PARTIAL: CPT | Performed by: INTERNAL MEDICINE

## 2019-04-20 PROCEDURE — 85025 COMPLETE CBC W/AUTO DIFF WBC: CPT | Performed by: INTERNAL MEDICINE

## 2019-04-20 PROCEDURE — 83880 ASSAY OF NATRIURETIC PEPTIDE: CPT | Performed by: PHYSICIAN ASSISTANT

## 2019-04-20 PROCEDURE — 82962 GLUCOSE BLOOD TEST: CPT

## 2019-04-20 PROCEDURE — 93306 TTE W/DOPPLER COMPLETE: CPT | Performed by: INTERNAL MEDICINE

## 2019-04-20 PROCEDURE — 85025 COMPLETE CBC W/AUTO DIFF WBC: CPT | Performed by: PHYSICIAN ASSISTANT

## 2019-04-20 PROCEDURE — 87633 RESP VIRUS 12-25 TARGETS: CPT | Performed by: PHYSICIAN ASSISTANT

## 2019-04-20 PROCEDURE — 85610 PROTHROMBIN TIME: CPT | Performed by: INTERNAL MEDICINE

## 2019-04-20 PROCEDURE — 83605 ASSAY OF LACTIC ACID: CPT | Performed by: PHYSICIAN ASSISTANT

## 2019-04-20 PROCEDURE — 87040 BLOOD CULTURE FOR BACTERIA: CPT | Performed by: PHYSICIAN ASSISTANT

## 2019-04-20 PROCEDURE — 93005 ELECTROCARDIOGRAM TRACING: CPT | Performed by: INTERNAL MEDICINE

## 2019-04-20 PROCEDURE — 0 IOPAMIDOL PER 1 ML: Performed by: EMERGENCY MEDICINE

## 2019-04-20 PROCEDURE — 25010000002 FUROSEMIDE PER 20 MG: Performed by: INTERNAL MEDICINE

## 2019-04-20 PROCEDURE — 87486 CHLMYD PNEUM DNA AMP PROBE: CPT | Performed by: PHYSICIAN ASSISTANT

## 2019-04-20 PROCEDURE — 84484 ASSAY OF TROPONIN QUANT: CPT | Performed by: PHYSICIAN ASSISTANT

## 2019-04-20 PROCEDURE — 99255 IP/OBS CONSLTJ NEW/EST HI 80: CPT | Performed by: INTERNAL MEDICINE

## 2019-04-20 PROCEDURE — 25010000002 PERFLUTREN (DEFINITY) 8.476 MG IN SODIUM CHLORIDE 0.9 % 10 ML INJECTION: Performed by: INTERNAL MEDICINE

## 2019-04-20 PROCEDURE — 71275 CT ANGIOGRAPHY CHEST: CPT

## 2019-04-20 PROCEDURE — 36415 COLL VENOUS BLD VENIPUNCTURE: CPT | Performed by: PHYSICIAN ASSISTANT

## 2019-04-20 PROCEDURE — 93306 TTE W/DOPPLER COMPLETE: CPT

## 2019-04-20 PROCEDURE — 93005 ELECTROCARDIOGRAM TRACING: CPT | Performed by: EMERGENCY MEDICINE

## 2019-04-20 PROCEDURE — 63710000001 INSULIN LISPRO (HUMAN) PER 5 UNITS: Performed by: INTERNAL MEDICINE

## 2019-04-20 PROCEDURE — 25010000002 CEFTRIAXONE PER 250 MG: Performed by: PHYSICIAN ASSISTANT

## 2019-04-20 PROCEDURE — 71045 X-RAY EXAM CHEST 1 VIEW: CPT

## 2019-04-20 PROCEDURE — 87798 DETECT AGENT NOS DNA AMP: CPT | Performed by: PHYSICIAN ASSISTANT

## 2019-04-20 PROCEDURE — 25010000002 AZITHROMYCIN PER 500 MG: Performed by: PHYSICIAN ASSISTANT

## 2019-04-20 RX ORDER — MONTELUKAST SODIUM 10 MG/1
10 TABLET ORAL NIGHTLY
COMMUNITY

## 2019-04-20 RX ORDER — FUROSEMIDE 10 MG/ML
40 INJECTION INTRAMUSCULAR; INTRAVENOUS ONCE
Status: COMPLETED | OUTPATIENT
Start: 2019-04-20 | End: 2019-04-20

## 2019-04-20 RX ORDER — CARVEDILOL 3.12 MG/1
3.12 TABLET ORAL EVERY 12 HOURS SCHEDULED
Status: DISCONTINUED | OUTPATIENT
Start: 2019-04-20 | End: 2019-04-23

## 2019-04-20 RX ORDER — ALLOPURINOL 300 MG/1
300 TABLET ORAL EVERY EVENING
Status: DISCONTINUED | OUTPATIENT
Start: 2019-04-20 | End: 2019-04-24

## 2019-04-20 RX ORDER — HEPARIN SODIUM 10000 [USP'U]/100ML
6.29 INJECTION, SOLUTION INTRAVENOUS
Status: DISCONTINUED | OUTPATIENT
Start: 2019-04-20 | End: 2019-04-24

## 2019-04-20 RX ORDER — FUROSEMIDE 10 MG/ML
40 INJECTION INTRAMUSCULAR; INTRAVENOUS 2 TIMES DAILY
Status: DISCONTINUED | OUTPATIENT
Start: 2019-04-21 | End: 2019-04-21

## 2019-04-20 RX ORDER — PERPHENAZINE/AMITRIPTYLINE HCL 4MG-10MG
2000 TABLET ORAL DAILY
COMMUNITY
End: 2019-05-10

## 2019-04-20 RX ORDER — HYDROMORPHONE HYDROCHLORIDE 1 MG/ML
0.5 INJECTION, SOLUTION INTRAMUSCULAR; INTRAVENOUS; SUBCUTANEOUS ONCE
Status: COMPLETED | OUTPATIENT
Start: 2019-04-20 | End: 2019-04-20

## 2019-04-20 RX ORDER — ONDANSETRON 2 MG/ML
4 INJECTION INTRAMUSCULAR; INTRAVENOUS EVERY 6 HOURS PRN
Status: DISCONTINUED | OUTPATIENT
Start: 2019-04-20 | End: 2019-04-24

## 2019-04-20 RX ORDER — SODIUM CHLORIDE 0.9 % (FLUSH) 0.9 %
3-10 SYRINGE (ML) INJECTION AS NEEDED
Status: DISCONTINUED | OUTPATIENT
Start: 2019-04-20 | End: 2019-04-24

## 2019-04-20 RX ORDER — MULTIPLE VITAMINS W/ MINERALS TAB 9MG-400MCG
1 TAB ORAL DAILY
Status: DISCONTINUED | OUTPATIENT
Start: 2019-04-20 | End: 2019-04-24

## 2019-04-20 RX ORDER — CHOLECALCIFEROL (VITAMIN D3) 125 MCG
5 CAPSULE ORAL NIGHTLY
Status: DISCONTINUED | OUTPATIENT
Start: 2019-04-20 | End: 2019-04-24

## 2019-04-20 RX ORDER — FLUTICASONE PROPIONATE 50 MCG
2 SPRAY, SUSPENSION (ML) NASAL DAILY PRN
COMMUNITY

## 2019-04-20 RX ORDER — ONDANSETRON 2 MG/ML
4 INJECTION INTRAMUSCULAR; INTRAVENOUS ONCE
Status: COMPLETED | OUTPATIENT
Start: 2019-04-20 | End: 2019-04-20

## 2019-04-20 RX ORDER — ALBUTEROL SULFATE 90 UG/1
2 AEROSOL, METERED RESPIRATORY (INHALATION) EVERY 4 HOURS PRN
COMMUNITY
End: 2020-06-25

## 2019-04-20 RX ORDER — SODIUM CHLORIDE 0.9 % (FLUSH) 0.9 %
10 SYRINGE (ML) INJECTION AS NEEDED
Status: DISCONTINUED | OUTPATIENT
Start: 2019-04-20 | End: 2019-04-24

## 2019-04-20 RX ORDER — CEFTRIAXONE SODIUM 1 G/50ML
1 INJECTION, SOLUTION INTRAVENOUS ONCE
Status: COMPLETED | OUTPATIENT
Start: 2019-04-20 | End: 2019-04-20

## 2019-04-20 RX ORDER — ACETAMINOPHEN 325 MG/1
650 TABLET ORAL EVERY 4 HOURS PRN
Status: DISCONTINUED | OUTPATIENT
Start: 2019-04-20 | End: 2019-04-24

## 2019-04-20 RX ORDER — ECHINACEA 400 MG
1000 CAPSULE ORAL DAILY
COMMUNITY
End: 2019-05-10

## 2019-04-20 RX ORDER — IPRATROPIUM BROMIDE AND ALBUTEROL SULFATE 2.5; .5 MG/3ML; MG/3ML
3 SOLUTION RESPIRATORY (INHALATION) ONCE
Status: COMPLETED | OUTPATIENT
Start: 2019-04-20 | End: 2019-04-20

## 2019-04-20 RX ORDER — ASPIRIN 81 MG/1
81 TABLET ORAL DAILY
Status: DISCONTINUED | OUTPATIENT
Start: 2019-04-20 | End: 2019-04-24

## 2019-04-20 RX ORDER — BUDESONIDE AND FORMOTEROL FUMARATE DIHYDRATE 160; 4.5 UG/1; UG/1
2 AEROSOL RESPIRATORY (INHALATION)
Status: DISCONTINUED | OUTPATIENT
Start: 2019-04-20 | End: 2019-04-24

## 2019-04-20 RX ORDER — CETIRIZINE HYDROCHLORIDE 10 MG/1
10 TABLET ORAL DAILY
Status: DISCONTINUED | OUTPATIENT
Start: 2019-04-20 | End: 2019-04-24

## 2019-04-20 RX ORDER — ASPIRIN 81 MG/1
TABLET, CHEWABLE ORAL
Status: COMPLETED
Start: 2019-04-20 | End: 2019-04-20

## 2019-04-20 RX ORDER — DEXTROSE MONOHYDRATE 25 G/50ML
25 INJECTION, SOLUTION INTRAVENOUS
Status: DISCONTINUED | OUTPATIENT
Start: 2019-04-20 | End: 2019-04-24

## 2019-04-20 RX ORDER — ALBUTEROL SULFATE 2.5 MG/3ML
2.5 SOLUTION RESPIRATORY (INHALATION) EVERY 6 HOURS PRN
Status: DISCONTINUED | OUTPATIENT
Start: 2019-04-20 | End: 2019-04-24

## 2019-04-20 RX ORDER — NICOTINE POLACRILEX 4 MG
15 LOZENGE BUCCAL
Status: DISCONTINUED | OUTPATIENT
Start: 2019-04-20 | End: 2019-04-24

## 2019-04-20 RX ORDER — MONTELUKAST SODIUM 10 MG/1
10 TABLET ORAL NIGHTLY
Status: DISCONTINUED | OUTPATIENT
Start: 2019-04-20 | End: 2019-04-24

## 2019-04-20 RX ORDER — HEPARIN SODIUM 5000 [USP'U]/ML
5000 INJECTION, SOLUTION INTRAVENOUS; SUBCUTANEOUS EVERY 12 HOURS SCHEDULED
Status: DISCONTINUED | OUTPATIENT
Start: 2019-04-20 | End: 2019-04-20

## 2019-04-20 RX ORDER — SODIUM CHLORIDE 0.9 % (FLUSH) 0.9 %
3 SYRINGE (ML) INJECTION EVERY 12 HOURS SCHEDULED
Status: DISCONTINUED | OUTPATIENT
Start: 2019-04-20 | End: 2019-04-24

## 2019-04-20 RX ORDER — HEPARIN SODIUM 5000 [USP'U]/ML
4000 INJECTION, SOLUTION INTRAVENOUS; SUBCUTANEOUS EVERY 6 HOURS PRN
Status: DISCONTINUED | OUTPATIENT
Start: 2019-04-20 | End: 2019-04-24

## 2019-04-20 RX ORDER — FLUTICASONE PROPIONATE 50 MCG
2 SPRAY, SUSPENSION (ML) NASAL DAILY PRN
Status: DISCONTINUED | OUTPATIENT
Start: 2019-04-20 | End: 2019-04-24

## 2019-04-20 RX ADMIN — HYDROMORPHONE HYDROCHLORIDE 0.5 MG: 1 INJECTION, SOLUTION INTRAMUSCULAR; INTRAVENOUS; SUBCUTANEOUS at 14:21

## 2019-04-20 RX ADMIN — HEPARIN SODIUM 4000 UNITS: 5000 INJECTION INTRAVENOUS; SUBCUTANEOUS at 20:22

## 2019-04-20 RX ADMIN — AZITHROMYCIN MONOHYDRATE 500 MG: 500 INJECTION, POWDER, LYOPHILIZED, FOR SOLUTION INTRAVENOUS at 15:58

## 2019-04-20 RX ADMIN — IPRATROPIUM BROMIDE AND ALBUTEROL SULFATE 3 ML: 2.5; .5 SOLUTION RESPIRATORY (INHALATION) at 13:43

## 2019-04-20 RX ADMIN — PERFLUTREN 2 ML: 6.52 INJECTION, SUSPENSION INTRAVENOUS at 21:10

## 2019-04-20 RX ADMIN — CETIRIZINE HYDROCHLORIDE 10 MG: 10 TABLET, FILM COATED ORAL at 21:18

## 2019-04-20 RX ADMIN — CEFTRIAXONE SODIUM 1 G: 1 INJECTION, SOLUTION INTRAVENOUS at 15:58

## 2019-04-20 RX ADMIN — ALBUTEROL SULFATE 2.5 MG: 2.5 SOLUTION RESPIRATORY (INHALATION) at 20:54

## 2019-04-20 RX ADMIN — ONDANSETRON 4 MG: 2 INJECTION INTRAMUSCULAR; INTRAVENOUS at 14:21

## 2019-04-20 RX ADMIN — FUROSEMIDE 40 MG: 10 INJECTION, SOLUTION INTRAMUSCULAR; INTRAVENOUS at 18:54

## 2019-04-20 RX ADMIN — INSULIN LISPRO 2 UNITS: 100 INJECTION, SOLUTION INTRAVENOUS; SUBCUTANEOUS at 21:27

## 2019-04-20 RX ADMIN — MONTELUKAST SODIUM 10 MG: 10 TABLET, FILM COATED ORAL at 21:18

## 2019-04-20 RX ADMIN — HEPARIN SODIUM 6.29 UNITS/KG/HR: 10000 INJECTION, SOLUTION INTRAVENOUS at 20:20

## 2019-04-20 RX ADMIN — CARVEDILOL 3.12 MG: 3.12 TABLET, FILM COATED ORAL at 22:53

## 2019-04-20 RX ADMIN — SODIUM CHLORIDE, PRESERVATIVE FREE 3 ML: 5 INJECTION INTRAVENOUS at 21:27

## 2019-04-20 RX ADMIN — ASPIRIN 81 MG: 81 TABLET, DELAYED RELEASE ORAL at 22:53

## 2019-04-20 RX ADMIN — NITROGLYCERIN 1 INCH: 20 OINTMENT TOPICAL at 21:18

## 2019-04-20 RX ADMIN — Medication 5 MG: at 22:55

## 2019-04-20 RX ADMIN — ONDANSETRON HYDROCHLORIDE 4 MG: 2 SOLUTION INTRAMUSCULAR; INTRAVENOUS at 21:34

## 2019-04-20 RX ADMIN — BUDESONIDE AND FORMOTEROL FUMARATE DIHYDRATE 2 PUFF: 160; 4.5 AEROSOL RESPIRATORY (INHALATION) at 19:28

## 2019-04-20 RX ADMIN — IOPAMIDOL 95 ML: 755 INJECTION, SOLUTION INTRAVENOUS at 14:50

## 2019-04-20 RX ADMIN — HYDROMORPHONE HYDROCHLORIDE 0.5 MG: 1 INJECTION, SOLUTION INTRAMUSCULAR; INTRAVENOUS; SUBCUTANEOUS at 21:27

## 2019-04-20 RX ADMIN — ALLOPURINOL 300 MG: 300 TABLET ORAL at 21:17

## 2019-04-20 RX ADMIN — ASPIRIN 81 MG: 81 TABLET, CHEWABLE ORAL at 21:17

## 2019-04-21 LAB
ALBUMIN SERPL-MCNC: 3.8 G/DL (ref 3.5–5.2)
ALBUMIN/GLOB SERPL: 1.2 G/DL
ALP SERPL-CCNC: 63 U/L (ref 39–117)
ALT SERPL W P-5'-P-CCNC: 62 U/L (ref 1–41)
ANION GAP SERPL CALCULATED.3IONS-SCNC: 18.6 MMOL/L
APTT PPP: 28.4 SECONDS (ref 22.7–35.4)
APTT PPP: 33.2 SECONDS (ref 22.7–35.4)
APTT PPP: 44.1 SECONDS (ref 22.7–35.4)
APTT PPP: 45.7 SECONDS (ref 22.7–35.4)
AST SERPL-CCNC: 312 U/L (ref 1–40)
BASOPHILS # BLD AUTO: 0.04 10*3/MM3 (ref 0–0.2)
BASOPHILS NFR BLD AUTO: 0.3 % (ref 0–1.5)
BH CV ECHO MEAS - AO MAX PG (FULL): 1.2 MMHG
BH CV ECHO MEAS - AO MAX PG: 3.1 MMHG
BH CV ECHO MEAS - AO MEAN PG (FULL): 1 MMHG
BH CV ECHO MEAS - AO MEAN PG: 2 MMHG
BH CV ECHO MEAS - AO ROOT AREA (BSA CORRECTED): 1.4
BH CV ECHO MEAS - AO ROOT AREA: 10.8 CM^2
BH CV ECHO MEAS - AO ROOT DIAM: 3.7 CM
BH CV ECHO MEAS - AO V2 MAX: 87.5 CM/SEC
BH CV ECHO MEAS - AO V2 MEAN: 61 CM/SEC
BH CV ECHO MEAS - AO V2 VTI: 12.4 CM
BH CV ECHO MEAS - ASC AORTA: 3.6 CM
BH CV ECHO MEAS - AVA(I,A): 3.9 CM^2
BH CV ECHO MEAS - AVA(I,D): 3.9 CM^2
BH CV ECHO MEAS - AVA(V,A): 3.2 CM^2
BH CV ECHO MEAS - AVA(V,D): 3.2 CM^2
BH CV ECHO MEAS - BSA(HAYCOCK): 2.9 M^2
BH CV ECHO MEAS - BSA: 2.6 M^2
BH CV ECHO MEAS - BZI_BMI: 53.1 KILOGRAMS/M^2
BH CV ECHO MEAS - BZI_METRIC_HEIGHT: 173 CM
BH CV ECHO MEAS - BZI_METRIC_WEIGHT: 159 KG
BH CV ECHO MEAS - EDV(CUBED): 148.9 ML
BH CV ECHO MEAS - EDV(MOD-SP2): 96 ML
BH CV ECHO MEAS - EDV(MOD-SP4): 106 ML
BH CV ECHO MEAS - EDV(TEICH): 135.3 ML
BH CV ECHO MEAS - EF(CUBED): 34.6 %
BH CV ECHO MEAS - EF(MOD-BP): 51 %
BH CV ECHO MEAS - EF(MOD-SP2): 56.3 %
BH CV ECHO MEAS - EF(MOD-SP4): 50 %
BH CV ECHO MEAS - EF(TEICH): 28.1 %
BH CV ECHO MEAS - ESV(CUBED): 97.3 ML
BH CV ECHO MEAS - ESV(MOD-SP2): 42 ML
BH CV ECHO MEAS - ESV(MOD-SP4): 53 ML
BH CV ECHO MEAS - ESV(TEICH): 97.3 ML
BH CV ECHO MEAS - FS: 13.2 %
BH CV ECHO MEAS - IVS/LVPW: 1.7
BH CV ECHO MEAS - IVSD: 1.5 CM
BH CV ECHO MEAS - LAT PEAK E' VEL: 8.9 CM/SEC
BH CV ECHO MEAS - LV DIASTOLIC VOL/BSA (35-75): 40.8 ML/M^2
BH CV ECHO MEAS - LV MASS(C)D: 256.6 GRAMS
BH CV ECHO MEAS - LV MASS(C)DI: 98.8 GRAMS/M^2
BH CV ECHO MEAS - LV MAX PG: 1.8 MMHG
BH CV ECHO MEAS - LV MEAN PG: 1 MMHG
BH CV ECHO MEAS - LV SYSTOLIC VOL/BSA (12-30): 20.4 ML/M^2
BH CV ECHO MEAS - LV V1 MAX: 67.9 CM/SEC
BH CV ECHO MEAS - LV V1 MEAN: 48.3 CM/SEC
BH CV ECHO MEAS - LV V1 VTI: 11.7 CM
BH CV ECHO MEAS - LVIDD: 5.3 CM
BH CV ECHO MEAS - LVIDS: 4.6 CM
BH CV ECHO MEAS - LVLD AP2: 9.8 CM
BH CV ECHO MEAS - LVLD AP4: 8.7 CM
BH CV ECHO MEAS - LVLS AP2: 9 CM
BH CV ECHO MEAS - LVLS AP4: 7.3 CM
BH CV ECHO MEAS - LVOT AREA (M): 4.2 CM^2
BH CV ECHO MEAS - LVOT AREA: 4.2 CM^2
BH CV ECHO MEAS - LVOT DIAM: 2.3 CM
BH CV ECHO MEAS - LVPWD: 0.9 CM
BH CV ECHO MEAS - MED PEAK E' VEL: 8.9 CM/SEC
BH CV ECHO MEAS - MV A DUR: 116 SEC
BH CV ECHO MEAS - MV A MAX VEL: 63.1 CM/SEC
BH CV ECHO MEAS - MV DEC SLOPE: 810.5 CM/SEC^2
BH CV ECHO MEAS - MV DEC TIME: 109 SEC
BH CV ECHO MEAS - MV E MAX VEL: 80 CM/SEC
BH CV ECHO MEAS - MV E/A: 1.3
BH CV ECHO MEAS - MV MAX PG: 3 MMHG
BH CV ECHO MEAS - MV MEAN PG: 2 MMHG
BH CV ECHO MEAS - MV P1/2T MAX VEL: 105 CM/SEC
BH CV ECHO MEAS - MV P1/2T: 37.9 MSEC
BH CV ECHO MEAS - MV V2 MAX: 86.2 CM/SEC
BH CV ECHO MEAS - MV V2 MEAN: 60.9 CM/SEC
BH CV ECHO MEAS - MV V2 VTI: 16 CM
BH CV ECHO MEAS - MVA P1/2T LCG: 2.1 CM^2
BH CV ECHO MEAS - MVA(P1/2T): 5.8 CM^2
BH CV ECHO MEAS - MVA(VTI): 3 CM^2
BH CV ECHO MEAS - PA ACC TIME: 0.09 SEC
BH CV ECHO MEAS - PA MAX PG (FULL): 0.86 MMHG
BH CV ECHO MEAS - PA MAX PG: 3 MMHG
BH CV ECHO MEAS - PA PR(ACCEL): 39.4 MMHG
BH CV ECHO MEAS - PA V2 MAX: 87.3 CM/SEC
BH CV ECHO MEAS - PVA(V,A): 2.9 CM^2
BH CV ECHO MEAS - PVA(V,D): 2.9 CM^2
BH CV ECHO MEAS - QP/QS: 0.75
BH CV ECHO MEAS - RV MAX PG: 2.2 MMHG
BH CV ECHO MEAS - RV MEAN PG: 1 MMHG
BH CV ECHO MEAS - RV V1 MAX: 73.9 CM/SEC
BH CV ECHO MEAS - RV V1 MEAN: 51.5 CM/SEC
BH CV ECHO MEAS - RV V1 VTI: 10.5 CM
BH CV ECHO MEAS - RVOT AREA: 3.5 CM^2
BH CV ECHO MEAS - RVOT DIAM: 2.1 CM
BH CV ECHO MEAS - SI(AO): 51.3 ML/M^2
BH CV ECHO MEAS - SI(CUBED): 19.8 ML/M^2
BH CV ECHO MEAS - SI(LVOT): 18.7 ML/M^2
BH CV ECHO MEAS - SI(MOD-SP2): 20.8 ML/M^2
BH CV ECHO MEAS - SI(MOD-SP4): 20.4 ML/M^2
BH CV ECHO MEAS - SI(TEICH): 14.6 ML/M^2
BH CV ECHO MEAS - SV(AO): 133.3 ML
BH CV ECHO MEAS - SV(CUBED): 51.5 ML
BH CV ECHO MEAS - SV(LVOT): 48.6 ML
BH CV ECHO MEAS - SV(MOD-SP2): 54 ML
BH CV ECHO MEAS - SV(MOD-SP4): 53 ML
BH CV ECHO MEAS - SV(RVOT): 36.4 ML
BH CV ECHO MEAS - SV(TEICH): 38 ML
BH CV ECHO MEAS - TAPSE (>1.6): 1.8 CM2
BH CV ECHO MEASUREMENTS AVERAGE E/E' RATIO: 8.99
BH CV XLRA - RV BASE: 4.85 CM
BH CV XLRA - TDI S': 24.6 CM/SEC
BILIRUB SERPL-MCNC: 0.6 MG/DL (ref 0.2–1.2)
BUN BLD-MCNC: 19 MG/DL (ref 6–20)
BUN/CREAT SERPL: 21.1 (ref 7–25)
CALCIUM SPEC-SCNC: 8.9 MG/DL (ref 8.6–10.5)
CHLORIDE SERPL-SCNC: 91 MMOL/L (ref 98–107)
CO2 SERPL-SCNC: 23.4 MMOL/L (ref 22–29)
CREAT BLD-MCNC: 0.9 MG/DL (ref 0.76–1.27)
DEPRECATED RDW RBC AUTO: 50.4 FL (ref 37–54)
EOSINOPHIL # BLD AUTO: 0.03 10*3/MM3 (ref 0–0.4)
EOSINOPHIL NFR BLD AUTO: 0.2 % (ref 0.3–6.2)
ERYTHROCYTE [DISTWIDTH] IN BLOOD BY AUTOMATED COUNT: 15.6 % (ref 12.3–15.4)
GFR SERPL CREATININE-BSD FRML MDRD: 89 ML/MIN/1.73
GLOBULIN UR ELPH-MCNC: 3.2 GM/DL
GLUCOSE BLD-MCNC: 159 MG/DL (ref 65–99)
GLUCOSE BLDC GLUCOMTR-MCNC: 165 MG/DL (ref 70–130)
GLUCOSE BLDC GLUCOMTR-MCNC: 178 MG/DL (ref 70–130)
GLUCOSE BLDC GLUCOMTR-MCNC: 184 MG/DL (ref 70–130)
GLUCOSE BLDC GLUCOMTR-MCNC: 233 MG/DL (ref 70–130)
HBA1C MFR BLD: 7 % (ref 4.8–5.6)
HCT VFR BLD AUTO: 45.2 % (ref 37.5–51)
HGB BLD-MCNC: 14.3 G/DL (ref 13–17.7)
IMM GRANULOCYTES # BLD AUTO: 0.09 10*3/MM3 (ref 0–0.05)
IMM GRANULOCYTES NFR BLD AUTO: 0.7 % (ref 0–0.5)
LV EF 2D ECHO EST: 45 %
LYMPHOCYTES # BLD AUTO: 2.51 10*3/MM3 (ref 0.7–3.1)
LYMPHOCYTES NFR BLD AUTO: 19.5 % (ref 19.6–45.3)
MAGNESIUM SERPL-MCNC: 2.1 MG/DL (ref 1.6–2.6)
MAXIMAL PREDICTED HEART RATE: 168 BPM
MCH RBC QN AUTO: 28.4 PG (ref 26.6–33)
MCHC RBC AUTO-ENTMCNC: 31.6 G/DL (ref 31.5–35.7)
MCV RBC AUTO: 89.9 FL (ref 79–97)
MONOCYTES # BLD AUTO: 1.07 10*3/MM3 (ref 0.1–0.9)
MONOCYTES NFR BLD AUTO: 8.3 % (ref 5–12)
NEUTROPHILS # BLD AUTO: 9.1 10*3/MM3 (ref 1.7–7)
NEUTROPHILS NFR BLD AUTO: 71 % (ref 42.7–76)
NRBC BLD AUTO-RTO: 0 /100 WBC (ref 0–0.2)
PLATELET # BLD AUTO: 269 10*3/MM3 (ref 140–450)
PMV BLD AUTO: 9.2 FL (ref 6–12)
POTASSIUM BLD-SCNC: 3.7 MMOL/L (ref 3.5–5.2)
PROT SERPL-MCNC: 7 G/DL (ref 6–8.5)
RBC # BLD AUTO: 5.03 10*6/MM3 (ref 4.14–5.8)
SODIUM BLD-SCNC: 133 MMOL/L (ref 136–145)
STRESS TARGET HR: 143 BPM
TROPONIN T SERPL-MCNC: 3.29 NG/ML (ref 0–0.03)
TSH SERPL DL<=0.05 MIU/L-ACNC: 1.09 MIU/ML (ref 0.27–4.2)
WBC NRBC COR # BLD: 12.84 10*3/MM3 (ref 3.4–10.8)

## 2019-04-21 PROCEDURE — C1894 INTRO/SHEATH, NON-LASER: HCPCS | Performed by: INTERNAL MEDICINE

## 2019-04-21 PROCEDURE — 85025 COMPLETE CBC W/AUTO DIFF WBC: CPT | Performed by: INTERNAL MEDICINE

## 2019-04-21 PROCEDURE — 25010000002 FUROSEMIDE PER 20 MG: Performed by: INTERNAL MEDICINE

## 2019-04-21 PROCEDURE — 85730 THROMBOPLASTIN TIME PARTIAL: CPT | Performed by: INTERNAL MEDICINE

## 2019-04-21 PROCEDURE — 94799 UNLISTED PULMONARY SVC/PX: CPT

## 2019-04-21 PROCEDURE — 80053 COMPREHEN METABOLIC PANEL: CPT | Performed by: INTERNAL MEDICINE

## 2019-04-21 PROCEDURE — B2151ZZ FLUOROSCOPY OF LEFT HEART USING LOW OSMOLAR CONTRAST: ICD-10-PCS | Performed by: INTERNAL MEDICINE

## 2019-04-21 PROCEDURE — 0 IOPAMIDOL PER 1 ML: Performed by: INTERNAL MEDICINE

## 2019-04-21 PROCEDURE — 82962 GLUCOSE BLOOD TEST: CPT

## 2019-04-21 PROCEDURE — 25010000002 HEPARIN (PORCINE) PER 1000 UNITS: Performed by: INTERNAL MEDICINE

## 2019-04-21 PROCEDURE — 99152 MOD SED SAME PHYS/QHP 5/>YRS: CPT | Performed by: INTERNAL MEDICINE

## 2019-04-21 PROCEDURE — C1769 GUIDE WIRE: HCPCS | Performed by: INTERNAL MEDICINE

## 2019-04-21 PROCEDURE — 4A023N7 MEASUREMENT OF CARDIAC SAMPLING AND PRESSURE, LEFT HEART, PERCUTANEOUS APPROACH: ICD-10-PCS | Performed by: INTERNAL MEDICINE

## 2019-04-21 PROCEDURE — 63710000001 INSULIN LISPRO (HUMAN) PER 5 UNITS: Performed by: INTERNAL MEDICINE

## 2019-04-21 PROCEDURE — 25010000002 MIDAZOLAM PER 1 MG: Performed by: INTERNAL MEDICINE

## 2019-04-21 PROCEDURE — 83735 ASSAY OF MAGNESIUM: CPT | Performed by: INTERNAL MEDICINE

## 2019-04-21 PROCEDURE — 99233 SBSQ HOSP IP/OBS HIGH 50: CPT | Performed by: INTERNAL MEDICINE

## 2019-04-21 PROCEDURE — 83036 HEMOGLOBIN GLYCOSYLATED A1C: CPT | Performed by: INTERNAL MEDICINE

## 2019-04-21 PROCEDURE — 93458 L HRT ARTERY/VENTRICLE ANGIO: CPT | Performed by: INTERNAL MEDICINE

## 2019-04-21 PROCEDURE — B2111ZZ FLUOROSCOPY OF MULTIPLE CORONARY ARTERIES USING LOW OSMOLAR CONTRAST: ICD-10-PCS | Performed by: INTERNAL MEDICINE

## 2019-04-21 PROCEDURE — 25010000002 FENTANYL CITRATE (PF) 100 MCG/2ML SOLUTION: Performed by: INTERNAL MEDICINE

## 2019-04-21 PROCEDURE — 93005 ELECTROCARDIOGRAM TRACING: CPT | Performed by: INTERNAL MEDICINE

## 2019-04-21 PROCEDURE — 84443 ASSAY THYROID STIM HORMONE: CPT | Performed by: INTERNAL MEDICINE

## 2019-04-21 PROCEDURE — 93010 ELECTROCARDIOGRAM REPORT: CPT | Performed by: INTERNAL MEDICINE

## 2019-04-21 PROCEDURE — 84484 ASSAY OF TROPONIN QUANT: CPT | Performed by: INTERNAL MEDICINE

## 2019-04-21 RX ORDER — SODIUM CHLORIDE 9 MG/ML
100 INJECTION, SOLUTION INTRAVENOUS CONTINUOUS
Status: DISCONTINUED | OUTPATIENT
Start: 2019-04-21 | End: 2019-04-24

## 2019-04-21 RX ORDER — FENTANYL CITRATE 50 UG/ML
INJECTION, SOLUTION INTRAMUSCULAR; INTRAVENOUS AS NEEDED
Status: DISCONTINUED | OUTPATIENT
Start: 2019-04-21 | End: 2019-04-21 | Stop reason: HOSPADM

## 2019-04-21 RX ORDER — LIDOCAINE HYDROCHLORIDE 20 MG/ML
INJECTION, SOLUTION INFILTRATION; PERINEURAL AS NEEDED
Status: DISCONTINUED | OUTPATIENT
Start: 2019-04-21 | End: 2019-04-21 | Stop reason: HOSPADM

## 2019-04-21 RX ORDER — SODIUM CHLORIDE 9 MG/ML
INJECTION, SOLUTION INTRAVENOUS CONTINUOUS PRN
Status: COMPLETED | OUTPATIENT
Start: 2019-04-21 | End: 2019-04-21

## 2019-04-21 RX ORDER — MIDAZOLAM HYDROCHLORIDE 1 MG/ML
INJECTION INTRAMUSCULAR; INTRAVENOUS AS NEEDED
Status: DISCONTINUED | OUTPATIENT
Start: 2019-04-21 | End: 2019-04-21 | Stop reason: HOSPADM

## 2019-04-21 RX ORDER — LISINOPRIL 20 MG/1
20 TABLET ORAL
Status: DISCONTINUED | OUTPATIENT
Start: 2019-04-22 | End: 2019-04-24

## 2019-04-21 RX ADMIN — HEPARIN SODIUM 4000 UNITS: 5000 INJECTION INTRAVENOUS; SUBCUTANEOUS at 02:17

## 2019-04-21 RX ADMIN — SODIUM CHLORIDE, PRESERVATIVE FREE 3 ML: 5 INJECTION INTRAVENOUS at 08:22

## 2019-04-21 RX ADMIN — FUROSEMIDE 40 MG: 10 INJECTION, SOLUTION INTRAMUSCULAR; INTRAVENOUS at 08:21

## 2019-04-21 RX ADMIN — SODIUM CHLORIDE, PRESERVATIVE FREE 3 ML: 5 INJECTION INTRAVENOUS at 20:58

## 2019-04-21 RX ADMIN — NITROGLYCERIN 1 INCH: 20 OINTMENT TOPICAL at 20:58

## 2019-04-21 RX ADMIN — INSULIN LISPRO 2 UNITS: 100 INJECTION, SOLUTION INTRAVENOUS; SUBCUTANEOUS at 13:28

## 2019-04-21 RX ADMIN — HEPARIN SODIUM 12.29 UNITS/KG/HR: 10000 INJECTION, SOLUTION INTRAVENOUS at 09:20

## 2019-04-21 RX ADMIN — ACETAMINOPHEN 650 MG: 325 TABLET, FILM COATED ORAL at 14:58

## 2019-04-21 RX ADMIN — CARVEDILOL 3.12 MG: 3.12 TABLET, FILM COATED ORAL at 20:57

## 2019-04-21 RX ADMIN — LISINOPRIL: 20 TABLET ORAL at 08:22

## 2019-04-21 RX ADMIN — HEPARIN SODIUM 4000 UNITS: 5000 INJECTION INTRAVENOUS; SUBCUTANEOUS at 09:20

## 2019-04-21 RX ADMIN — ACETAMINOPHEN 650 MG: 325 TABLET, FILM COATED ORAL at 08:22

## 2019-04-21 RX ADMIN — ACETAMINOPHEN 650 MG: 325 TABLET, FILM COATED ORAL at 04:17

## 2019-04-21 RX ADMIN — HEPARIN SODIUM 4000 UNITS: 5000 INJECTION INTRAVENOUS; SUBCUTANEOUS at 23:21

## 2019-04-21 RX ADMIN — INSULIN LISPRO 2 UNITS: 100 INJECTION, SOLUTION INTRAVENOUS; SUBCUTANEOUS at 20:59

## 2019-04-21 RX ADMIN — HEPARIN SODIUM 12.29 UNITS/KG/HR: 10000 INJECTION, SOLUTION INTRAVENOUS at 17:25

## 2019-04-21 RX ADMIN — INSULIN LISPRO 4 UNITS: 100 INJECTION, SOLUTION INTRAVENOUS; SUBCUTANEOUS at 18:10

## 2019-04-21 RX ADMIN — NITROGLYCERIN 1 INCH: 20 OINTMENT TOPICAL at 13:28

## 2019-04-21 RX ADMIN — MONTELUKAST SODIUM 10 MG: 10 TABLET, FILM COATED ORAL at 20:57

## 2019-04-21 RX ADMIN — ACETAMINOPHEN 650 MG: 325 TABLET, FILM COATED ORAL at 20:58

## 2019-04-21 RX ADMIN — CARVEDILOL 3.12 MG: 3.12 TABLET, FILM COATED ORAL at 08:22

## 2019-04-21 RX ADMIN — Medication 5 MG: at 20:57

## 2019-04-21 RX ADMIN — NITROGLYCERIN 1 INCH: 20 OINTMENT TOPICAL at 06:39

## 2019-04-21 RX ADMIN — ALLOPURINOL 300 MG: 300 TABLET ORAL at 18:09

## 2019-04-21 RX ADMIN — BUDESONIDE AND FORMOTEROL FUMARATE DIHYDRATE 2 PUFF: 160; 4.5 AEROSOL RESPIRATORY (INHALATION) at 07:30

## 2019-04-22 ENCOUNTER — APPOINTMENT (OUTPATIENT)
Dept: GENERAL RADIOLOGY | Facility: HOSPITAL | Age: 54
End: 2019-04-22

## 2019-04-22 LAB
ALBUMIN SERPL-MCNC: 3.5 G/DL (ref 3.5–5.2)
ALBUMIN/GLOB SERPL: 1.1 G/DL
ALP SERPL-CCNC: 50 U/L (ref 39–117)
ALT SERPL W P-5'-P-CCNC: 45 U/L (ref 1–41)
ANION GAP SERPL CALCULATED.3IONS-SCNC: 16.3 MMOL/L
APTT PPP: 78 SECONDS (ref 22.7–35.4)
AST SERPL-CCNC: 133 U/L (ref 1–40)
BACTERIA UR QL AUTO: NORMAL /HPF
BASOPHILS # BLD AUTO: 0.05 10*3/MM3 (ref 0–0.2)
BASOPHILS NFR BLD AUTO: 0.4 % (ref 0–1.5)
BILIRUB SERPL-MCNC: 0.7 MG/DL (ref 0.2–1.2)
BILIRUB UR QL STRIP: NEGATIVE
BUN BLD-MCNC: 32 MG/DL (ref 6–20)
BUN/CREAT SERPL: 21.5 (ref 7–25)
CALCIUM SPEC-SCNC: 8.9 MG/DL (ref 8.6–10.5)
CHLORIDE SERPL-SCNC: 91 MMOL/L (ref 98–107)
CHOLEST SERPL-MCNC: 94 MG/DL (ref 0–200)
CLARITY UR: CLEAR
CLOSE TME COLL+ADP + EPINEP PNL BLD: 98 % (ref 86–100)
CO2 SERPL-SCNC: 23.7 MMOL/L (ref 22–29)
COLOR UR: YELLOW
CREAT BLD-MCNC: 1.49 MG/DL (ref 0.76–1.27)
DEPRECATED RDW RBC AUTO: 49.7 FL (ref 37–54)
EOSINOPHIL # BLD AUTO: 0.07 10*3/MM3 (ref 0–0.4)
EOSINOPHIL NFR BLD AUTO: 0.5 % (ref 0.3–6.2)
ERYTHROCYTE [DISTWIDTH] IN BLOOD BY AUTOMATED COUNT: 15.3 % (ref 12.3–15.4)
GFR SERPL CREATININE-BSD FRML MDRD: 50 ML/MIN/1.73
GLOBULIN UR ELPH-MCNC: 3.1 GM/DL
GLUCOSE BLD-MCNC: 160 MG/DL (ref 65–99)
GLUCOSE BLDC GLUCOMTR-MCNC: 166 MG/DL (ref 70–130)
GLUCOSE BLDC GLUCOMTR-MCNC: 166 MG/DL (ref 70–130)
GLUCOSE BLDC GLUCOMTR-MCNC: 188 MG/DL (ref 70–130)
GLUCOSE BLDC GLUCOMTR-MCNC: 201 MG/DL (ref 70–130)
GLUCOSE UR STRIP-MCNC: ABNORMAL MG/DL
HCT VFR BLD AUTO: 40 % (ref 37.5–51)
HDLC SERPL-MCNC: 38 MG/DL (ref 40–60)
HGB BLD-MCNC: 13 G/DL (ref 13–17.7)
HGB UR QL STRIP.AUTO: ABNORMAL
HYALINE CASTS UR QL AUTO: NORMAL /LPF
IMM GRANULOCYTES # BLD AUTO: 0.1 10*3/MM3 (ref 0–0.05)
IMM GRANULOCYTES NFR BLD AUTO: 0.8 % (ref 0–0.5)
KETONES UR QL STRIP: ABNORMAL
LDLC SERPL CALC-MCNC: 34 MG/DL (ref 0–100)
LDLC/HDLC SERPL: 0.88 {RATIO}
LEUKOCYTE ESTERASE UR QL STRIP.AUTO: NEGATIVE
LYMPHOCYTES # BLD AUTO: 2.5 10*3/MM3 (ref 0.7–3.1)
LYMPHOCYTES NFR BLD AUTO: 18.9 % (ref 19.6–45.3)
MCH RBC QN AUTO: 28.7 PG (ref 26.6–33)
MCHC RBC AUTO-ENTMCNC: 32.5 G/DL (ref 31.5–35.7)
MCV RBC AUTO: 88.3 FL (ref 79–97)
MONOCYTES # BLD AUTO: 1.19 10*3/MM3 (ref 0.1–0.9)
MONOCYTES NFR BLD AUTO: 9 % (ref 5–12)
NEUTROPHILS # BLD AUTO: 9.32 10*3/MM3 (ref 1.7–7)
NEUTROPHILS NFR BLD AUTO: 70.4 % (ref 42.7–76)
NITRITE UR QL STRIP: NEGATIVE
NRBC BLD AUTO-RTO: 0 /100 WBC (ref 0–0.2)
PH UR STRIP.AUTO: 5.5 [PH] (ref 5–8)
PLATELET # BLD AUTO: 242 10*3/MM3 (ref 140–450)
PMV BLD AUTO: 9.2 FL (ref 6–12)
POTASSIUM BLD-SCNC: 3.4 MMOL/L (ref 3.5–5.2)
POTASSIUM BLD-SCNC: 3.8 MMOL/L (ref 3.5–5.2)
PROT SERPL-MCNC: 6.6 G/DL (ref 6–8.5)
PROT UR QL STRIP: NEGATIVE
RBC # BLD AUTO: 4.53 10*6/MM3 (ref 4.14–5.8)
RBC # UR: NORMAL /HPF
REF LAB TEST METHOD: NORMAL
SODIUM BLD-SCNC: 131 MMOL/L (ref 136–145)
SP GR UR STRIP: 1.01 (ref 1–1.03)
SQUAMOUS #/AREA URNS HPF: NORMAL /HPF
TRIGL SERPL-MCNC: 112 MG/DL (ref 0–150)
TROPONIN T SERPL-MCNC: 4.37 NG/ML (ref 0–0.03)
UROBILINOGEN UR QL STRIP: ABNORMAL
VLDLC SERPL-MCNC: 22.4 MG/DL (ref 5–40)
WBC NRBC COR # BLD: 13.23 10*3/MM3 (ref 3.4–10.8)
WBC UR QL AUTO: NORMAL /HPF

## 2019-04-22 PROCEDURE — 71046 X-RAY EXAM CHEST 2 VIEWS: CPT

## 2019-04-22 PROCEDURE — 25010000002 ONDANSETRON PER 1 MG: Performed by: INTERNAL MEDICINE

## 2019-04-22 PROCEDURE — 85730 THROMBOPLASTIN TIME PARTIAL: CPT | Performed by: INTERNAL MEDICINE

## 2019-04-22 PROCEDURE — 84484 ASSAY OF TROPONIN QUANT: CPT | Performed by: INTERNAL MEDICINE

## 2019-04-22 PROCEDURE — 80053 COMPREHEN METABOLIC PANEL: CPT | Performed by: INTERNAL MEDICINE

## 2019-04-22 PROCEDURE — 85025 COMPLETE CBC W/AUTO DIFF WBC: CPT | Performed by: INTERNAL MEDICINE

## 2019-04-22 PROCEDURE — 99254 IP/OBS CNSLTJ NEW/EST MOD 60: CPT | Performed by: NURSE PRACTITIONER

## 2019-04-22 PROCEDURE — 63710000001 INSULIN LISPRO (HUMAN) PER 5 UNITS: Performed by: INTERNAL MEDICINE

## 2019-04-22 PROCEDURE — 93005 ELECTROCARDIOGRAM TRACING: CPT | Performed by: INTERNAL MEDICINE

## 2019-04-22 PROCEDURE — 84132 ASSAY OF SERUM POTASSIUM: CPT | Performed by: THORACIC SURGERY (CARDIOTHORACIC VASCULAR SURGERY)

## 2019-04-22 PROCEDURE — 25010000002 HEPARIN (PORCINE) PER 1000 UNITS: Performed by: INTERNAL MEDICINE

## 2019-04-22 PROCEDURE — 82962 GLUCOSE BLOOD TEST: CPT

## 2019-04-22 PROCEDURE — 85576 BLOOD PLATELET AGGREGATION: CPT | Performed by: NURSE PRACTITIONER

## 2019-04-22 PROCEDURE — 81001 URINALYSIS AUTO W/SCOPE: CPT | Performed by: NURSE PRACTITIONER

## 2019-04-22 PROCEDURE — 99232 SBSQ HOSP IP/OBS MODERATE 35: CPT | Performed by: INTERNAL MEDICINE

## 2019-04-22 PROCEDURE — 93010 ELECTROCARDIOGRAM REPORT: CPT | Performed by: INTERNAL MEDICINE

## 2019-04-22 PROCEDURE — 80061 LIPID PANEL: CPT | Performed by: INTERNAL MEDICINE

## 2019-04-22 RX ORDER — POTASSIUM CHLORIDE 7.45 MG/ML
10 INJECTION INTRAVENOUS
Status: DISCONTINUED | OUTPATIENT
Start: 2019-04-22 | End: 2019-04-24

## 2019-04-22 RX ORDER — POTASSIUM CHLORIDE 1.5 G/1.77G
40 POWDER, FOR SOLUTION ORAL AS NEEDED
Status: DISCONTINUED | OUTPATIENT
Start: 2019-04-22 | End: 2019-04-24

## 2019-04-22 RX ORDER — POTASSIUM CHLORIDE 750 MG/1
20 CAPSULE, EXTENDED RELEASE ORAL DAILY
Status: DISCONTINUED | OUTPATIENT
Start: 2019-04-22 | End: 2019-04-24

## 2019-04-22 RX ORDER — LOPERAMIDE HYDROCHLORIDE 2 MG/1
2 CAPSULE ORAL 4 TIMES DAILY PRN
Status: DISCONTINUED | OUTPATIENT
Start: 2019-04-22 | End: 2019-04-24

## 2019-04-22 RX ORDER — SODIUM CHLORIDE 9 MG/ML
100 INJECTION, SOLUTION INTRAVENOUS CONTINUOUS
Status: ACTIVE | OUTPATIENT
Start: 2019-04-22 | End: 2019-04-22

## 2019-04-22 RX ORDER — POTASSIUM CHLORIDE 750 MG/1
40 CAPSULE, EXTENDED RELEASE ORAL AS NEEDED
Status: DISCONTINUED | OUTPATIENT
Start: 2019-04-22 | End: 2019-04-24

## 2019-04-22 RX ADMIN — CETIRIZINE HYDROCHLORIDE 10 MG: 10 TABLET, FILM COATED ORAL at 14:49

## 2019-04-22 RX ADMIN — INSULIN LISPRO 2 UNITS: 100 INJECTION, SOLUTION INTRAVENOUS; SUBCUTANEOUS at 06:15

## 2019-04-22 RX ADMIN — MONTELUKAST SODIUM 10 MG: 10 TABLET, FILM COATED ORAL at 20:16

## 2019-04-22 RX ADMIN — ALLOPURINOL 300 MG: 300 TABLET ORAL at 18:07

## 2019-04-22 RX ADMIN — CARVEDILOL 3.12 MG: 3.12 TABLET, FILM COATED ORAL at 08:52

## 2019-04-22 RX ADMIN — INSULIN LISPRO 2 UNITS: 100 INJECTION, SOLUTION INTRAVENOUS; SUBCUTANEOUS at 17:16

## 2019-04-22 RX ADMIN — SODIUM CHLORIDE 100 ML/HR: 9 INJECTION, SOLUTION INTRAVENOUS at 11:58

## 2019-04-22 RX ADMIN — NITROGLYCERIN 1 INCH: 20 OINTMENT TOPICAL at 14:49

## 2019-04-22 RX ADMIN — HEPARIN SODIUM 15.29 UNITS/KG/HR: 10000 INJECTION, SOLUTION INTRAVENOUS at 18:06

## 2019-04-22 RX ADMIN — MULTIPLE VITAMINS W/ MINERALS TAB 1 TABLET: TAB at 14:49

## 2019-04-22 RX ADMIN — INSULIN LISPRO 2 UNITS: 100 INJECTION, SOLUTION INTRAVENOUS; SUBCUTANEOUS at 22:17

## 2019-04-22 RX ADMIN — ASPIRIN 81 MG: 81 TABLET, DELAYED RELEASE ORAL at 08:52

## 2019-04-22 RX ADMIN — SODIUM CHLORIDE, PRESERVATIVE FREE 3 ML: 5 INJECTION INTRAVENOUS at 20:17

## 2019-04-22 RX ADMIN — HEPARIN SODIUM 15.29 UNITS/KG/HR: 10000 INJECTION, SOLUTION INTRAVENOUS at 05:38

## 2019-04-22 RX ADMIN — CARVEDILOL 3.12 MG: 3.12 TABLET, FILM COATED ORAL at 20:16

## 2019-04-22 RX ADMIN — POTASSIUM CHLORIDE 40 MEQ: 750 CAPSULE, EXTENDED RELEASE ORAL at 11:59

## 2019-04-22 RX ADMIN — LOPERAMIDE HYDROCHLORIDE 2 MG: 2 CAPSULE ORAL at 14:42

## 2019-04-22 RX ADMIN — Medication 5 MG: at 20:16

## 2019-04-22 RX ADMIN — ACETAMINOPHEN 650 MG: 325 TABLET, FILM COATED ORAL at 11:59

## 2019-04-22 RX ADMIN — NITROGLYCERIN 1 INCH: 20 OINTMENT TOPICAL at 20:16

## 2019-04-22 RX ADMIN — ONDANSETRON HYDROCHLORIDE 4 MG: 2 SOLUTION INTRAMUSCULAR; INTRAVENOUS at 08:52

## 2019-04-22 RX ADMIN — ACETAMINOPHEN 650 MG: 325 TABLET, FILM COATED ORAL at 22:17

## 2019-04-23 ENCOUNTER — APPOINTMENT (OUTPATIENT)
Dept: CARDIOLOGY | Facility: HOSPITAL | Age: 54
End: 2019-04-23

## 2019-04-23 ENCOUNTER — TELEPHONE (OUTPATIENT)
Dept: ENDOCRINOLOGY | Age: 54
End: 2019-04-23

## 2019-04-23 ENCOUNTER — ANESTHESIA EVENT (OUTPATIENT)
Dept: PERIOP | Facility: HOSPITAL | Age: 54
End: 2019-04-23

## 2019-04-23 ENCOUNTER — APPOINTMENT (OUTPATIENT)
Dept: RESPIRATORY THERAPY | Facility: HOSPITAL | Age: 54
End: 2019-04-23

## 2019-04-23 PROBLEM — I25.10 CORONARY ARTERY DISEASE INVOLVING NATIVE CORONARY ARTERY OF NATIVE HEART WITHOUT ANGINA PECTORIS: Status: ACTIVE | Noted: 2019-04-20

## 2019-04-23 LAB
ABO GROUP BLD: NORMAL
ALBUMIN SERPL-MCNC: 3.7 G/DL (ref 3.5–5.2)
ALBUMIN SERPL-MCNC: 3.7 G/DL (ref 3.5–5.2)
ALBUMIN/GLOB SERPL: 1.2 G/DL
ALBUMIN/GLOB SERPL: 1.2 G/DL
ALP SERPL-CCNC: 54 U/L (ref 39–117)
ALP SERPL-CCNC: 55 U/L (ref 39–117)
ALT SERPL W P-5'-P-CCNC: 34 U/L (ref 1–41)
ALT SERPL W P-5'-P-CCNC: 35 U/L (ref 1–41)
ANION GAP SERPL CALCULATED.3IONS-SCNC: 12.7 MMOL/L
ANION GAP SERPL CALCULATED.3IONS-SCNC: 15.6 MMOL/L
APTT PPP: 80.8 SECONDS (ref 22.7–35.4)
ARTERIAL PATENCY WRIST A: POSITIVE
AST SERPL-CCNC: 50 U/L (ref 1–40)
AST SERPL-CCNC: 56 U/L (ref 1–40)
ATMOSPHERIC PRESS: 749.3 MMHG
BASE EXCESS BLDA CALC-SCNC: 1.4 MMOL/L (ref 0–2)
BASOPHILS # BLD AUTO: 0.04 10*3/MM3 (ref 0–0.2)
BASOPHILS NFR BLD AUTO: 0.4 % (ref 0–1.5)
BDY SITE: ABNORMAL
BH CV XLRA MEAS LEFT CCA RATIO VEL: -71.5 CM/SEC
BH CV XLRA MEAS LEFT DIST CCA EDV: -21.2 CM/SEC
BH CV XLRA MEAS LEFT DIST CCA PSV: -71.5 CM/SEC
BH CV XLRA MEAS LEFT DIST ICA EDV: -21.8 CM/SEC
BH CV XLRA MEAS LEFT DIST ICA PSV: -62.6 CM/SEC
BH CV XLRA MEAS LEFT ICA RATIO VEL: -68.3 CM/SEC
BH CV XLRA MEAS LEFT ICA/CCA RATIO: 0.96
BH CV XLRA MEAS LEFT MID ICA EDV: -31.3 CM/SEC
BH CV XLRA MEAS LEFT MID ICA PSV: -68.3 CM/SEC
BH CV XLRA MEAS LEFT PROX CCA EDV: 23.4 CM/SEC
BH CV XLRA MEAS LEFT PROX CCA PSV: 92.1 CM/SEC
BH CV XLRA MEAS LEFT PROX ECA EDV: -8.5 CM/SEC
BH CV XLRA MEAS LEFT PROX ECA PSV: -77.1 CM/SEC
BH CV XLRA MEAS LEFT PROX ICA EDV: -20.9 CM/SEC
BH CV XLRA MEAS LEFT PROX ICA PSV: -67.3 CM/SEC
BH CV XLRA MEAS LEFT PROX SCLA PSV: 92.2 CM/SEC
BH CV XLRA MEAS LEFT VERTEBRAL A EDV: 13.4 CM/SEC
BH CV XLRA MEAS LEFT VERTEBRAL A PSV: 48.7 CM/SEC
BH CV XLRA MEAS RIGHT CCA RATIO VEL: -76.8 CM/SEC
BH CV XLRA MEAS RIGHT DIST CCA EDV: -18 CM/SEC
BH CV XLRA MEAS RIGHT DIST CCA PSV: -76.8 CM/SEC
BH CV XLRA MEAS RIGHT DIST ICA EDV: -26.8 CM/SEC
BH CV XLRA MEAS RIGHT DIST ICA PSV: -64.4 CM/SEC
BH CV XLRA MEAS RIGHT ICA RATIO VEL: -77 CM/SEC
BH CV XLRA MEAS RIGHT ICA/CCA RATIO: 1
BH CV XLRA MEAS RIGHT MID ICA EDV: -26.6 CM/SEC
BH CV XLRA MEAS RIGHT MID ICA PSV: -72.1 CM/SEC
BH CV XLRA MEAS RIGHT PROX CCA EDV: 14.2 CM/SEC
BH CV XLRA MEAS RIGHT PROX CCA PSV: 98.4 CM/SEC
BH CV XLRA MEAS RIGHT PROX ECA EDV: -9.5 CM/SEC
BH CV XLRA MEAS RIGHT PROX ECA PSV: -108 CM/SEC
BH CV XLRA MEAS RIGHT PROX ICA EDV: -27.3 CM/SEC
BH CV XLRA MEAS RIGHT PROX ICA PSV: -77 CM/SEC
BH CV XLRA MEAS RIGHT PROX SCLA PSV: 125 CM/SEC
BH CV XLRA MEAS RIGHT VERTEBRAL A EDV: 30.3 CM/SEC
BH CV XLRA MEAS RIGHT VERTEBRAL A PSV: 72.1 CM/SEC
BILIRUB SERPL-MCNC: 0.4 MG/DL (ref 0.2–1.2)
BILIRUB SERPL-MCNC: 0.5 MG/DL (ref 0.2–1.2)
BLD GP AB SCN SERPL QL: NEGATIVE
BUN BLD-MCNC: 18 MG/DL (ref 6–20)
BUN BLD-MCNC: 20 MG/DL (ref 6–20)
BUN/CREAT SERPL: 22 (ref 7–25)
BUN/CREAT SERPL: 23.8 (ref 7–25)
CALCIUM SPEC-SCNC: 8.7 MG/DL (ref 8.6–10.5)
CALCIUM SPEC-SCNC: 8.7 MG/DL (ref 8.6–10.5)
CHLORIDE SERPL-SCNC: 96 MMOL/L (ref 98–107)
CHLORIDE SERPL-SCNC: 96 MMOL/L (ref 98–107)
CLOSE TME COLL+ADP + EPINEP PNL BLD: 73 % (ref 86–100)
CO2 SERPL-SCNC: 25.3 MMOL/L (ref 22–29)
CO2 SERPL-SCNC: 25.4 MMOL/L (ref 22–29)
CREAT BLD-MCNC: 0.82 MG/DL (ref 0.76–1.27)
CREAT BLD-MCNC: 0.84 MG/DL (ref 0.76–1.27)
DEPRECATED RDW RBC AUTO: 48.8 FL (ref 37–54)
EOSINOPHIL # BLD AUTO: 0.15 10*3/MM3 (ref 0–0.4)
EOSINOPHIL NFR BLD AUTO: 1.5 % (ref 0.3–6.2)
ERYTHROCYTE [DISTWIDTH] IN BLOOD BY AUTOMATED COUNT: 15.2 % (ref 12.3–15.4)
GAS FLOW AIRWAY: 3 LPM
GFR SERPL CREATININE-BSD FRML MDRD: 96 ML/MIN/1.73
GFR SERPL CREATININE-BSD FRML MDRD: 99 ML/MIN/1.73
GLOBULIN UR ELPH-MCNC: 3.2 GM/DL
GLOBULIN UR ELPH-MCNC: 3.2 GM/DL
GLUCOSE BLD-MCNC: 162 MG/DL (ref 65–99)
GLUCOSE BLD-MCNC: 331 MG/DL (ref 65–99)
GLUCOSE BLDC GLUCOMTR-MCNC: 149 MG/DL (ref 70–130)
GLUCOSE BLDC GLUCOMTR-MCNC: 149 MG/DL (ref 70–130)
GLUCOSE BLDC GLUCOMTR-MCNC: 230 MG/DL (ref 70–130)
GLUCOSE BLDC GLUCOMTR-MCNC: 297 MG/DL (ref 70–130)
HCO3 BLDA-SCNC: 25.1 MMOL/L (ref 22–28)
HCT VFR BLD AUTO: 41.6 % (ref 37.5–51)
HGB BLD-MCNC: 13.3 G/DL (ref 13–17.7)
IMM GRANULOCYTES # BLD AUTO: 0.07 10*3/MM3 (ref 0–0.05)
IMM GRANULOCYTES NFR BLD AUTO: 0.7 % (ref 0–0.5)
INR PPP: 1.05 (ref 0.9–1.1)
LEFT ARM BP: NORMAL MMHG
LYMPHOCYTES # BLD AUTO: 2.02 10*3/MM3 (ref 0.7–3.1)
LYMPHOCYTES NFR BLD AUTO: 19.7 % (ref 19.6–45.3)
MAGNESIUM SERPL-MCNC: 2.4 MG/DL (ref 1.6–2.6)
MCH RBC QN AUTO: 28.1 PG (ref 26.6–33)
MCHC RBC AUTO-ENTMCNC: 32 G/DL (ref 31.5–35.7)
MCV RBC AUTO: 87.8 FL (ref 79–97)
MODALITY: ABNORMAL
MONOCYTES # BLD AUTO: 0.7 10*3/MM3 (ref 0.1–0.9)
MONOCYTES NFR BLD AUTO: 6.8 % (ref 5–12)
NEUTROPHILS # BLD AUTO: 7.28 10*3/MM3 (ref 1.7–7)
NEUTROPHILS NFR BLD AUTO: 70.9 % (ref 42.7–76)
NRBC BLD AUTO-RTO: 0 /100 WBC (ref 0–0.2)
NT-PROBNP SERPL-MCNC: 1107 PG/ML (ref 5–900)
PCO2 BLDA: 36.3 MM HG (ref 35–45)
PH BLDA: 7.45 PH UNITS (ref 7.35–7.45)
PLATELET # BLD AUTO: 230 10*3/MM3 (ref 140–450)
PMV BLD AUTO: 9.1 FL (ref 6–12)
PO2 BLDA: 78.9 MM HG (ref 80–100)
POTASSIUM BLD-SCNC: 4 MMOL/L (ref 3.5–5.2)
POTASSIUM BLD-SCNC: 4.1 MMOL/L (ref 3.5–5.2)
PROT SERPL-MCNC: 6.9 G/DL (ref 6–8.5)
PROT SERPL-MCNC: 6.9 G/DL (ref 6–8.5)
PROTHROMBIN TIME: 13.4 SECONDS (ref 11.7–14.2)
RBC # BLD AUTO: 4.74 10*6/MM3 (ref 4.14–5.8)
RH BLD: POSITIVE
RIGHT ARM BP: NORMAL MMHG
SAO2 % BLDCOA: 96.2 % (ref 92–99)
SODIUM BLD-SCNC: 134 MMOL/L (ref 136–145)
SODIUM BLD-SCNC: 137 MMOL/L (ref 136–145)
T&S EXPIRATION DATE: NORMAL
TOTAL RATE: 20 BREATHS/MINUTE
TROPONIN T SERPL-MCNC: 2.69 NG/ML (ref 0–0.03)
WBC NRBC COR # BLD: 10.26 10*3/MM3 (ref 3.4–10.8)

## 2019-04-23 PROCEDURE — 94640 AIRWAY INHALATION TREATMENT: CPT

## 2019-04-23 PROCEDURE — 86901 BLOOD TYPING SEROLOGIC RH(D): CPT | Performed by: NURSE PRACTITIONER

## 2019-04-23 PROCEDURE — 25010000002 HEPARIN (PORCINE) PER 1000 UNITS: Performed by: INTERNAL MEDICINE

## 2019-04-23 PROCEDURE — 84484 ASSAY OF TROPONIN QUANT: CPT | Performed by: INTERNAL MEDICINE

## 2019-04-23 PROCEDURE — 86901 BLOOD TYPING SEROLOGIC RH(D): CPT

## 2019-04-23 PROCEDURE — 83735 ASSAY OF MAGNESIUM: CPT | Performed by: INTERNAL MEDICINE

## 2019-04-23 PROCEDURE — 99232 SBSQ HOSP IP/OBS MODERATE 35: CPT | Performed by: NURSE PRACTITIONER

## 2019-04-23 PROCEDURE — 94799 UNLISTED PULMONARY SVC/PX: CPT

## 2019-04-23 PROCEDURE — 93970 EXTREMITY STUDY: CPT

## 2019-04-23 PROCEDURE — 86900 BLOOD TYPING SEROLOGIC ABO: CPT

## 2019-04-23 PROCEDURE — 94060 EVALUATION OF WHEEZING: CPT

## 2019-04-23 PROCEDURE — 93880 EXTRACRANIAL BILAT STUDY: CPT

## 2019-04-23 PROCEDURE — 86900 BLOOD TYPING SEROLOGIC ABO: CPT | Performed by: NURSE PRACTITIONER

## 2019-04-23 PROCEDURE — 83880 ASSAY OF NATRIURETIC PEPTIDE: CPT | Performed by: NURSE PRACTITIONER

## 2019-04-23 PROCEDURE — 63710000001 INSULIN LISPRO (HUMAN) PER 5 UNITS: Performed by: INTERNAL MEDICINE

## 2019-04-23 PROCEDURE — 85730 THROMBOPLASTIN TIME PARTIAL: CPT | Performed by: INTERNAL MEDICINE

## 2019-04-23 PROCEDURE — 85576 BLOOD PLATELET AGGREGATION: CPT | Performed by: NURSE PRACTITIONER

## 2019-04-23 PROCEDURE — 86850 RBC ANTIBODY SCREEN: CPT | Performed by: NURSE PRACTITIONER

## 2019-04-23 PROCEDURE — 85610 PROTHROMBIN TIME: CPT | Performed by: NURSE PRACTITIONER

## 2019-04-23 PROCEDURE — 99254 IP/OBS CNSLTJ NEW/EST MOD 60: CPT | Performed by: INTERNAL MEDICINE

## 2019-04-23 PROCEDURE — 80053 COMPREHEN METABOLIC PANEL: CPT | Performed by: NURSE PRACTITIONER

## 2019-04-23 PROCEDURE — 36600 WITHDRAWAL OF ARTERIAL BLOOD: CPT

## 2019-04-23 PROCEDURE — 85025 COMPLETE CBC W/AUTO DIFF WBC: CPT | Performed by: INTERNAL MEDICINE

## 2019-04-23 PROCEDURE — 86923 COMPATIBILITY TEST ELECTRIC: CPT

## 2019-04-23 PROCEDURE — 82962 GLUCOSE BLOOD TEST: CPT

## 2019-04-23 PROCEDURE — 82803 BLOOD GASES ANY COMBINATION: CPT

## 2019-04-23 RX ORDER — DIPHENHYDRAMINE HYDROCHLORIDE, ZINC ACETATE 2; .1 G/100G; G/100G
CREAM TOPICAL 3 TIMES DAILY PRN
Status: DISCONTINUED | OUTPATIENT
Start: 2019-04-23 | End: 2019-04-24

## 2019-04-23 RX ORDER — INSULIN GLARGINE 100 [IU]/ML
20 INJECTION, SOLUTION SUBCUTANEOUS EVERY MORNING
Status: DISCONTINUED | OUTPATIENT
Start: 2019-04-23 | End: 2019-04-24

## 2019-04-23 RX ORDER — CEFAZOLIN SODIUM IN 0.9 % NACL 3 G/100 ML
3 INTRAVENOUS SOLUTION, PIGGYBACK (ML) INTRAVENOUS
Status: COMPLETED | OUTPATIENT
Start: 2019-04-24 | End: 2019-04-24

## 2019-04-23 RX ORDER — DIPHENHYDRAMINE HCL 25 MG
25 CAPSULE ORAL NIGHTLY PRN
Status: DISCONTINUED | OUTPATIENT
Start: 2019-04-23 | End: 2019-04-24

## 2019-04-23 RX ORDER — CHLORHEXIDINE GLUCONATE 500 MG/1
1 CLOTH TOPICAL EVERY 12 HOURS
Status: DISCONTINUED | OUTPATIENT
Start: 2019-04-23 | End: 2019-04-24

## 2019-04-23 RX ORDER — CARVEDILOL 6.25 MG/1
6.25 TABLET ORAL EVERY 12 HOURS SCHEDULED
Status: DISCONTINUED | OUTPATIENT
Start: 2019-04-23 | End: 2019-04-24

## 2019-04-23 RX ORDER — CHLORHEXIDINE GLUCONATE 0.12 MG/ML
15 RINSE ORAL EVERY 12 HOURS SCHEDULED
Status: COMPLETED | OUTPATIENT
Start: 2019-04-23 | End: 2019-04-24

## 2019-04-23 RX ADMIN — HEPARIN SODIUM 15.29 UNITS/KG/HR: 10000 INJECTION, SOLUTION INTRAVENOUS at 07:03

## 2019-04-23 RX ADMIN — SODIUM CHLORIDE, PRESERVATIVE FREE 3 ML: 5 INJECTION INTRAVENOUS at 09:58

## 2019-04-23 RX ADMIN — Medication 5 MG: at 23:36

## 2019-04-23 RX ADMIN — CHLORHEXIDINE GLUCONATE 1 APPLICATION: 500 CLOTH TOPICAL at 22:57

## 2019-04-23 RX ADMIN — ALLOPURINOL 300 MG: 300 TABLET ORAL at 17:23

## 2019-04-23 RX ADMIN — CARVEDILOL 3.12 MG: 3.12 TABLET, FILM COATED ORAL at 09:39

## 2019-04-23 RX ADMIN — ASPIRIN 81 MG: 81 TABLET, DELAYED RELEASE ORAL at 09:39

## 2019-04-23 RX ADMIN — MONTELUKAST SODIUM 10 MG: 10 TABLET, FILM COATED ORAL at 21:48

## 2019-04-23 RX ADMIN — NITROGLYCERIN 1 INCH: 20 OINTMENT TOPICAL at 09:57

## 2019-04-23 RX ADMIN — MULTIPLE VITAMINS W/ MINERALS TAB 1 TABLET: TAB at 09:40

## 2019-04-23 RX ADMIN — MUPIROCIN 1 APPLICATION: 20 OINTMENT TOPICAL at 21:50

## 2019-04-23 RX ADMIN — SODIUM CHLORIDE, PRESERVATIVE FREE 3 ML: 5 INJECTION INTRAVENOUS at 21:55

## 2019-04-23 RX ADMIN — NITROGLYCERIN 1 INCH: 20 OINTMENT TOPICAL at 23:36

## 2019-04-23 RX ADMIN — CHLORHEXIDINE GLUCONATE 15 ML: 1.2 RINSE ORAL at 21:49

## 2019-04-23 RX ADMIN — BUDESONIDE AND FORMOTEROL FUMARATE DIHYDRATE 2 PUFF: 160; 4.5 AEROSOL RESPIRATORY (INHALATION) at 11:18

## 2019-04-23 RX ADMIN — BUDESONIDE AND FORMOTEROL FUMARATE DIHYDRATE 2 PUFF: 160; 4.5 AEROSOL RESPIRATORY (INHALATION) at 21:06

## 2019-04-23 RX ADMIN — CETIRIZINE HYDROCHLORIDE 10 MG: 10 TABLET, FILM COATED ORAL at 09:39

## 2019-04-23 RX ADMIN — INSULIN LISPRO 2 UNITS: 100 INJECTION, SOLUTION INTRAVENOUS; SUBCUTANEOUS at 09:41

## 2019-04-23 RX ADMIN — ACETAMINOPHEN 650 MG: 325 TABLET, FILM COATED ORAL at 15:06

## 2019-04-23 RX ADMIN — ALBUTEROL SULFATE 2.5 MG: 2.5 SOLUTION RESPIRATORY (INHALATION) at 16:31

## 2019-04-23 RX ADMIN — ACETAMINOPHEN 650 MG: 325 TABLET, FILM COATED ORAL at 19:30

## 2019-04-23 RX ADMIN — ACETAMINOPHEN 650 MG: 325 TABLET, FILM COATED ORAL at 09:56

## 2019-04-23 RX ADMIN — CARVEDILOL 6.25 MG: 6.25 TABLET, FILM COATED ORAL at 21:48

## 2019-04-23 RX ADMIN — ACETAMINOPHEN 650 MG: 325 TABLET, FILM COATED ORAL at 23:36

## 2019-04-23 RX ADMIN — NITROGLYCERIN 1 INCH: 20 OINTMENT TOPICAL at 15:06

## 2019-04-23 RX ADMIN — INSULIN LISPRO 6 UNITS: 100 INJECTION, SOLUTION INTRAVENOUS; SUBCUTANEOUS at 13:53

## 2019-04-23 RX ADMIN — INSULIN LISPRO 4 UNITS: 100 INJECTION, SOLUTION INTRAVENOUS; SUBCUTANEOUS at 21:49

## 2019-04-23 NOTE — TELEPHONE ENCOUNTER
SPOKE WITH PATIENT  PATIENT IS ADMITTED AT [facility] HE WANTS TO SEE DR HERNANDEZ LET PATIENT KNOW TO TELL THE HOSPITAL THAT HE WANTS DR HERNANDEZ TO COME AND SEE HIM THAT THEY NEED TO SEND IN A CONSULT  PATIENT VOICE UNDERSTANDING

## 2019-04-23 NOTE — ANESTHESIA PREPROCEDURE EVALUATION
Anesthesia Evaluation     Patient summary reviewed and Nursing notes reviewed   no history of anesthetic complications:  NPO Solid Status: > 8 hours  NPO Liquid Status: > 8 hours           Airway   Mallampati: III  Dental - normal exam     Pulmonary - normal exam   (+) asthma, sleep apnea on CPAP,   Cardiovascular - normal exam    (+) hypertension 2 medications or greater, CAD, hyperlipidemia,     ROS comment: EF 35-45%    Neuro/Psych  GI/Hepatic/Renal/Endo    (+) obesity, morbid obesity,  diabetes mellitus type 2,     Musculoskeletal     Abdominal    Substance History      OB/GYN          Other                        Anesthesia Plan    ASA 4     general     intravenous induction   Anesthetic plan, all risks, benefits, and alternatives have been provided, discussed and informed consent has been obtained with: patient.

## 2019-04-24 ENCOUNTER — ANESTHESIA (OUTPATIENT)
Dept: PERIOP | Facility: HOSPITAL | Age: 54
End: 2019-04-24

## 2019-04-24 ENCOUNTER — ANCILLARY PROCEDURE (OUTPATIENT)
Dept: PERIOP | Facility: HOSPITAL | Age: 54
End: 2019-04-24

## 2019-04-24 ENCOUNTER — APPOINTMENT (OUTPATIENT)
Dept: GENERAL RADIOLOGY | Facility: HOSPITAL | Age: 54
End: 2019-04-24

## 2019-04-24 LAB
ACT BLD: 125 SECONDS (ref 82–152)
ACT BLD: 131 SECONDS (ref 82–152)
ACT BLD: 323 SECONDS (ref 82–152)
ACT BLD: 334 SECONDS (ref 82–152)
ACT BLD: 334 SECONDS (ref 82–152)
ACT BLD: 362 SECONDS (ref 82–152)
ALBUMIN SERPL-MCNC: 2.9 G/DL (ref 3.5–5.2)
ALBUMIN SERPL-MCNC: 3.7 G/DL (ref 3.5–5.2)
ALBUMIN SERPL-MCNC: 3.7 G/DL (ref 3.5–5.2)
ALBUMIN/GLOB SERPL: 0.8 G/DL
ALP SERPL-CCNC: 58 U/L (ref 39–117)
ALT SERPL W P-5'-P-CCNC: 33 U/L (ref 1–41)
ANION GAP SERPL CALCULATED.3IONS-SCNC: 14.8 MMOL/L
ANION GAP SERPL CALCULATED.3IONS-SCNC: 8.9 MMOL/L
ANION GAP SERPL CALCULATED.3IONS-SCNC: 8.9 MMOL/L
APTT PPP: 29.4 SECONDS (ref 22.7–35.4)
APTT PPP: 31.2 SECONDS (ref 22.7–35.4)
APTT PPP: 88 SECONDS (ref 22.7–35.4)
ARTERIAL PATENCY WRIST A: ABNORMAL
ARTERIAL PATENCY WRIST A: ABNORMAL
AST SERPL-CCNC: 35 U/L (ref 1–40)
ATMOSPHERIC PRESS: 747 MMHG
ATMOSPHERIC PRESS: 747.2 MMHG
BASE EXCESS BLDA CALC-SCNC: -2 MMOL/L (ref 0–2)
BASE EXCESS BLDA CALC-SCNC: -2.2 MMOL/L (ref 0–2)
BASOPHILS # BLD AUTO: 0.05 10*3/MM3 (ref 0–0.2)
BASOPHILS # BLD AUTO: 0.06 10*3/MM3 (ref 0–0.2)
BASOPHILS NFR BLD AUTO: 0.3 % (ref 0–1.5)
BASOPHILS NFR BLD AUTO: 0.5 % (ref 0–1.5)
BDY SITE: ABNORMAL
BDY SITE: ABNORMAL
BILIRUB SERPL-MCNC: 0.3 MG/DL (ref 0.2–1.2)
BUN BLD-MCNC: 14 MG/DL (ref 6–20)
BUN BLD-MCNC: 15 MG/DL (ref 6–20)
BUN BLD-MCNC: 16 MG/DL (ref 6–20)
BUN/CREAT SERPL: 13.9 (ref 7–25)
BUN/CREAT SERPL: 19 (ref 7–25)
BUN/CREAT SERPL: 23.1 (ref 7–25)
CA-I BLD-MCNC: 5.4 MG/DL (ref 4.6–5.4)
CA-I SERPL ISE-MCNC: 1.34 MMOL/L (ref 1.15–1.35)
CALCIUM SPEC-SCNC: 8.6 MG/DL (ref 8.6–10.5)
CALCIUM SPEC-SCNC: 9.3 MG/DL (ref 8.6–10.5)
CALCIUM SPEC-SCNC: 9.3 MG/DL (ref 8.6–10.5)
CHLORIDE SERPL-SCNC: 105 MMOL/L (ref 98–107)
CHLORIDE SERPL-SCNC: 109 MMOL/L (ref 98–107)
CHLORIDE SERPL-SCNC: 99 MMOL/L (ref 98–107)
CO2 SERPL-SCNC: 22.1 MMOL/L (ref 22–29)
CO2 SERPL-SCNC: 22.1 MMOL/L (ref 22–29)
CO2 SERPL-SCNC: 24.2 MMOL/L (ref 22–29)
CREAT BLD-MCNC: 0.65 MG/DL (ref 0.76–1.27)
CREAT BLD-MCNC: 0.84 MG/DL (ref 0.76–1.27)
CREAT BLD-MCNC: 1.01 MG/DL (ref 0.76–1.27)
DEPRECATED RDW RBC AUTO: 47.9 FL (ref 37–54)
DEPRECATED RDW RBC AUTO: 48.5 FL (ref 37–54)
DEPRECATED RDW RBC AUTO: 48.8 FL (ref 37–54)
DEPRECATED RDW RBC AUTO: 49.1 FL (ref 37–54)
EOSINOPHIL # BLD AUTO: 0.01 10*3/MM3 (ref 0–0.4)
EOSINOPHIL # BLD AUTO: 0.27 10*3/MM3 (ref 0–0.4)
EOSINOPHIL NFR BLD AUTO: 0.1 % (ref 0.3–6.2)
EOSINOPHIL NFR BLD AUTO: 2.9 % (ref 0.3–6.2)
ERYTHROCYTE [DISTWIDTH] IN BLOOD BY AUTOMATED COUNT: 14.7 % (ref 12.3–15.4)
ERYTHROCYTE [DISTWIDTH] IN BLOOD BY AUTOMATED COUNT: 15 % (ref 12.3–15.4)
ERYTHROCYTE [DISTWIDTH] IN BLOOD BY AUTOMATED COUNT: 15.1 % (ref 12.3–15.4)
ERYTHROCYTE [DISTWIDTH] IN BLOOD BY AUTOMATED COUNT: 15.1 % (ref 12.3–15.4)
FIBRINOGEN PPP-MCNC: 503 MG/DL (ref 219–464)
FIBRINOGEN PPP-MCNC: 554 MG/DL (ref 219–464)
GFR SERPL CREATININE-BSD FRML MDRD: 129 ML/MIN/1.73
GFR SERPL CREATININE-BSD FRML MDRD: 78 ML/MIN/1.73
GFR SERPL CREATININE-BSD FRML MDRD: 96 ML/MIN/1.73
GLOBULIN UR ELPH-MCNC: 3.8 GM/DL
GLUCOSE BLD-MCNC: 158 MG/DL (ref 65–99)
GLUCOSE BLD-MCNC: 179 MG/DL (ref 65–99)
GLUCOSE BLD-MCNC: 218 MG/DL (ref 65–99)
GLUCOSE BLDC GLUCOMTR-MCNC: 132 MG/DL (ref 70–130)
GLUCOSE BLDC GLUCOMTR-MCNC: 140 MG/DL (ref 70–130)
GLUCOSE BLDC GLUCOMTR-MCNC: 144 MG/DL (ref 70–130)
GLUCOSE BLDC GLUCOMTR-MCNC: 158 MG/DL (ref 70–130)
GLUCOSE BLDC GLUCOMTR-MCNC: 171 MG/DL (ref 70–130)
GLUCOSE BLDC GLUCOMTR-MCNC: 177 MG/DL (ref 70–130)
GLUCOSE BLDC GLUCOMTR-MCNC: 190 MG/DL (ref 70–130)
GLUCOSE BLDC GLUCOMTR-MCNC: 202 MG/DL (ref 70–130)
GLUCOSE BLDC GLUCOMTR-MCNC: 212 MG/DL (ref 70–130)
HCO3 BLDA-SCNC: 22 MMOL/L (ref 22–28)
HCO3 BLDA-SCNC: 22.2 MMOL/L (ref 22–28)
HCT VFR BLD AUTO: 33.7 % (ref 37.5–51)
HCT VFR BLD AUTO: 36.3 % (ref 37.5–51)
HCT VFR BLD AUTO: 38.8 % (ref 37.5–51)
HCT VFR BLD AUTO: 39.3 % (ref 37.5–51)
HGB BLD-MCNC: 10.9 G/DL (ref 13–17.7)
HGB BLD-MCNC: 12 G/DL (ref 13–17.7)
HGB BLD-MCNC: 12.5 G/DL (ref 13–17.7)
HGB BLD-MCNC: 12.6 G/DL (ref 13–17.7)
HOROWITZ INDEX BLD+IHG-RTO: 100 %
HOROWITZ INDEX BLD+IHG-RTO: 30 %
IMM GRANULOCYTES # BLD AUTO: 0.05 10*3/MM3 (ref 0–0.05)
IMM GRANULOCYTES # BLD AUTO: 0.16 10*3/MM3 (ref 0–0.05)
IMM GRANULOCYTES NFR BLD AUTO: 0.5 % (ref 0–0.5)
IMM GRANULOCYTES NFR BLD AUTO: 0.9 % (ref 0–0.5)
INR PPP: 1.27 (ref 0.9–1.1)
INR PPP: 1.34 (ref 0.9–1.1)
LYMPHOCYTES # BLD AUTO: 1.31 10*3/MM3 (ref 0.7–3.1)
LYMPHOCYTES # BLD AUTO: 2.24 10*3/MM3 (ref 0.7–3.1)
LYMPHOCYTES NFR BLD AUTO: 24.1 % (ref 19.6–45.3)
LYMPHOCYTES NFR BLD AUTO: 7.6 % (ref 19.6–45.3)
MAGNESIUM SERPL-MCNC: 2.4 MG/DL (ref 1.6–2.6)
MAGNESIUM SERPL-MCNC: 2.8 MG/DL (ref 1.6–2.6)
MCH RBC QN AUTO: 28.1 PG (ref 26.6–33)
MCH RBC QN AUTO: 28.7 PG (ref 26.6–33)
MCH RBC QN AUTO: 28.7 PG (ref 26.6–33)
MCH RBC QN AUTO: 29.2 PG (ref 26.6–33)
MCHC RBC AUTO-ENTMCNC: 32.1 G/DL (ref 31.5–35.7)
MCHC RBC AUTO-ENTMCNC: 32.2 G/DL (ref 31.5–35.7)
MCHC RBC AUTO-ENTMCNC: 32.3 G/DL (ref 31.5–35.7)
MCHC RBC AUTO-ENTMCNC: 33.1 G/DL (ref 31.5–35.7)
MCV RBC AUTO: 86.8 FL (ref 79–97)
MCV RBC AUTO: 87.7 FL (ref 79–97)
MCV RBC AUTO: 89 FL (ref 79–97)
MCV RBC AUTO: 90.3 FL (ref 79–97)
MODALITY: ABNORMAL
MODALITY: ABNORMAL
MONOCYTES # BLD AUTO: 0.71 10*3/MM3 (ref 0.1–0.9)
MONOCYTES # BLD AUTO: 1.41 10*3/MM3 (ref 0.1–0.9)
MONOCYTES NFR BLD AUTO: 7.6 % (ref 5–12)
MONOCYTES NFR BLD AUTO: 8.2 % (ref 5–12)
NEUTROPHILS # BLD AUTO: 14.28 10*3/MM3 (ref 1.7–7)
NEUTROPHILS # BLD AUTO: 5.98 10*3/MM3 (ref 1.7–7)
NEUTROPHILS NFR BLD AUTO: 64.4 % (ref 42.7–76)
NEUTROPHILS NFR BLD AUTO: 82.9 % (ref 42.7–76)
NRBC BLD AUTO-RTO: 0 /100 WBC (ref 0–0.2)
NRBC BLD AUTO-RTO: 0 /100 WBC (ref 0–0.2)
O2 A-A PPRESDIFF RESPIRATORY: 0.4 MMHG
O2 A-A PPRESDIFF RESPIRATORY: 0.7 MMHG
PCO2 BLDA: 34.9 MM HG (ref 35–45)
PCO2 BLDA: 35.3 MM HG (ref 35–45)
PEEP RESPIRATORY: 10 CM[H2O]
PEEP RESPIRATORY: 10 CM[H2O]
PH BLDA: 7.41 PH UNITS (ref 7.35–7.45)
PH BLDA: 7.41 PH UNITS (ref 7.35–7.45)
PHOSPHATE SERPL-MCNC: 1.7 MG/DL (ref 2.5–4.5)
PHOSPHATE SERPL-MCNC: 1.9 MG/DL (ref 2.5–4.5)
PLATELET # BLD AUTO: 218 10*3/MM3 (ref 140–450)
PLATELET # BLD AUTO: 228 10*3/MM3 (ref 140–450)
PLATELET # BLD AUTO: 236 10*3/MM3 (ref 140–450)
PLATELET # BLD AUTO: 240 10*3/MM3 (ref 140–450)
PMV BLD AUTO: 9.1 FL (ref 6–12)
PMV BLD AUTO: 9.2 FL (ref 6–12)
PMV BLD AUTO: 9.3 FL (ref 6–12)
PMV BLD AUTO: 9.5 FL (ref 6–12)
PO2 BLDA: 117.8 MM HG (ref 80–100)
PO2 BLDA: 270.8 MM HG (ref 80–100)
POTASSIUM BLD-SCNC: 3.6 MMOL/L (ref 3.5–5.2)
POTASSIUM BLD-SCNC: 4.6 MMOL/L (ref 3.5–5.2)
POTASSIUM BLD-SCNC: 4.8 MMOL/L (ref 3.5–5.2)
PROT SERPL-MCNC: 6.7 G/DL (ref 6–8.5)
PROTHROMBIN TIME: 15.6 SECONDS (ref 11.7–14.2)
PROTHROMBIN TIME: 16.3 SECONDS (ref 11.7–14.2)
RBC # BLD AUTO: 3.73 10*6/MM3 (ref 4.14–5.8)
RBC # BLD AUTO: 4.18 10*6/MM3 (ref 4.14–5.8)
RBC # BLD AUTO: 4.36 10*6/MM3 (ref 4.14–5.8)
RBC # BLD AUTO: 4.48 10*6/MM3 (ref 4.14–5.8)
SAO2 % BLDCOA: 98.7 % (ref 92–99)
SAO2 % BLDCOA: 99.9 % (ref 92–99)
SET MECH RESP RATE: 16
SET MECH RESP RATE: 27
SODIUM BLD-SCNC: 136 MMOL/L (ref 136–145)
SODIUM BLD-SCNC: 138 MMOL/L (ref 136–145)
SODIUM BLD-SCNC: 140 MMOL/L (ref 136–145)
TOTAL RATE: 16 BREATHS/MINUTE
TOTAL RATE: 27 BREATHS/MINUTE
VENTILATOR MODE: ABNORMAL
VENTILATOR MODE: ABNORMAL
VT ON VENT VENT: 513 ML
VT ON VENT VENT: 750 ML
WBC NRBC COR # BLD: 16.66 10*3/MM3 (ref 3.4–10.8)
WBC NRBC COR # BLD: 17.23 10*3/MM3 (ref 3.4–10.8)
WBC NRBC COR # BLD: 19.29 10*3/MM3 (ref 3.4–10.8)
WBC NRBC COR # BLD: 9.3 10*3/MM3 (ref 3.4–10.8)

## 2019-04-24 PROCEDURE — C1751 CATH, INF, PER/CENT/MIDLINE: HCPCS | Performed by: ANESTHESIOLOGY

## 2019-04-24 PROCEDURE — 33519 CABG ARTERY-VEIN THREE: CPT | Performed by: PHYSICIAN ASSISTANT

## 2019-04-24 PROCEDURE — A4648 IMPLANTABLE TISSUE MARKER: HCPCS | Performed by: THORACIC SURGERY (CARDIOTHORACIC VASCULAR SURGERY)

## 2019-04-24 PROCEDURE — 85730 THROMBOPLASTIN TIME PARTIAL: CPT | Performed by: INTERNAL MEDICINE

## 2019-04-24 PROCEDURE — 82330 ASSAY OF CALCIUM: CPT | Performed by: THORACIC SURGERY (CARDIOTHORACIC VASCULAR SURGERY)

## 2019-04-24 PROCEDURE — 93010 ELECTROCARDIOGRAM REPORT: CPT | Performed by: INTERNAL MEDICINE

## 2019-04-24 PROCEDURE — 25010000002 HYDROCORTISONE SODIUM SUCCINATE 100 MG RECONSTITUTED SOLUTION: Performed by: ANESTHESIOLOGY

## 2019-04-24 PROCEDURE — 71045 X-RAY EXAM CHEST 1 VIEW: CPT

## 2019-04-24 PROCEDURE — 25010000002 HEPARIN (PORCINE) PER 1000 UNITS

## 2019-04-24 PROCEDURE — 82947 ASSAY GLUCOSE BLOOD QUANT: CPT

## 2019-04-24 PROCEDURE — 33519 CABG ARTERY-VEIN THREE: CPT | Performed by: THORACIC SURGERY (CARDIOTHORACIC VASCULAR SURGERY)

## 2019-04-24 PROCEDURE — 25010000002 ALBUMIN HUMAN 5% PER 50 ML: Performed by: THORACIC SURGERY (CARDIOTHORACIC VASCULAR SURGERY)

## 2019-04-24 PROCEDURE — 25010000002 PROPOFOL 10 MG/ML EMULSION: Performed by: ANESTHESIOLOGY

## 2019-04-24 PROCEDURE — 33533 CABG ARTERIAL SINGLE: CPT | Performed by: THORACIC SURGERY (CARDIOTHORACIC VASCULAR SURGERY)

## 2019-04-24 PROCEDURE — 25010000002 PHENYLEPHRINE PER 1 ML: Performed by: ANESTHESIOLOGY

## 2019-04-24 PROCEDURE — 25010000002 ONDANSETRON PER 1 MG: Performed by: ANESTHESIOLOGY

## 2019-04-24 PROCEDURE — 25010000002 FENTANYL CITRATE (PF) 100 MCG/2ML SOLUTION: Performed by: ANESTHESIOLOGY

## 2019-04-24 PROCEDURE — 021209W BYPASS CORONARY ARTERY, THREE ARTERIES FROM AORTA WITH AUTOLOGOUS VENOUS TISSUE, OPEN APPROACH: ICD-10-PCS | Performed by: THORACIC SURGERY (CARDIOTHORACIC VASCULAR SURGERY)

## 2019-04-24 PROCEDURE — 85014 HEMATOCRIT: CPT

## 2019-04-24 PROCEDURE — 94799 UNLISTED PULMONARY SVC/PX: CPT

## 2019-04-24 PROCEDURE — 85730 THROMBOPLASTIN TIME PARTIAL: CPT | Performed by: THORACIC SURGERY (CARDIOTHORACIC VASCULAR SURGERY)

## 2019-04-24 PROCEDURE — 06BQ4ZZ EXCISION OF LEFT SAPHENOUS VEIN, PERCUTANEOUS ENDOSCOPIC APPROACH: ICD-10-PCS | Performed by: THORACIC SURGERY (CARDIOTHORACIC VASCULAR SURGERY)

## 2019-04-24 PROCEDURE — 85027 COMPLETE CBC AUTOMATED: CPT | Performed by: THORACIC SURGERY (CARDIOTHORACIC VASCULAR SURGERY)

## 2019-04-24 PROCEDURE — 93318 ECHO TRANSESOPHAGEAL INTRAOP: CPT

## 2019-04-24 PROCEDURE — 25010000002 MAGNESIUM SULFATE PER 500 MG OF MAGNESIUM: Performed by: ANESTHESIOLOGY

## 2019-04-24 PROCEDURE — 25010000002 HEPARIN (PORCINE) PER 1000 UNITS: Performed by: ANESTHESIOLOGY

## 2019-04-24 PROCEDURE — 63710000001 INSULIN REGULAR HUMAN PER 5 UNITS: Performed by: ANESTHESIOLOGY

## 2019-04-24 PROCEDURE — 25010000002 PROTAMINE SULFATE PER 10 MG: Performed by: ANESTHESIOLOGY

## 2019-04-24 PROCEDURE — 83735 ASSAY OF MAGNESIUM: CPT | Performed by: THORACIC SURGERY (CARDIOTHORACIC VASCULAR SURGERY)

## 2019-04-24 PROCEDURE — C1729 CATH, DRAINAGE: HCPCS | Performed by: THORACIC SURGERY (CARDIOTHORACIC VASCULAR SURGERY)

## 2019-04-24 PROCEDURE — 5A1221Z PERFORMANCE OF CARDIAC OUTPUT, CONTINUOUS: ICD-10-PCS | Performed by: THORACIC SURGERY (CARDIOTHORACIC VASCULAR SURGERY)

## 2019-04-24 PROCEDURE — 33508 ENDOSCOPIC VEIN HARVEST: CPT | Performed by: THORACIC SURGERY (CARDIOTHORACIC VASCULAR SURGERY)

## 2019-04-24 PROCEDURE — 80069 RENAL FUNCTION PANEL: CPT | Performed by: THORACIC SURGERY (CARDIOTHORACIC VASCULAR SURGERY)

## 2019-04-24 PROCEDURE — 80053 COMPREHEN METABOLIC PANEL: CPT | Performed by: NURSE PRACTITIONER

## 2019-04-24 PROCEDURE — 99024 POSTOP FOLLOW-UP VISIT: CPT | Performed by: THORACIC SURGERY (CARDIOTHORACIC VASCULAR SURGERY)

## 2019-04-24 PROCEDURE — 25010000002 VANCOMYCIN 1 G RECONSTITUTED SOLUTION: Performed by: THORACIC SURGERY (CARDIOTHORACIC VASCULAR SURGERY)

## 2019-04-24 PROCEDURE — 25010000002 CEFAZOLIN PER 500 MG: Performed by: THORACIC SURGERY (CARDIOTHORACIC VASCULAR SURGERY)

## 2019-04-24 PROCEDURE — 85347 COAGULATION TIME ACTIVATED: CPT

## 2019-04-24 PROCEDURE — 33508 ENDOSCOPIC VEIN HARVEST: CPT | Performed by: PHYSICIAN ASSISTANT

## 2019-04-24 PROCEDURE — 85025 COMPLETE CBC W/AUTO DIFF WBC: CPT | Performed by: THORACIC SURGERY (CARDIOTHORACIC VASCULAR SURGERY)

## 2019-04-24 PROCEDURE — 25010000002 HEPARIN (PORCINE) PER 1000 UNITS: Performed by: INTERNAL MEDICINE

## 2019-04-24 PROCEDURE — 25010000002 SUCCINYLCHOLINE PER 20 MG: Performed by: ANESTHESIOLOGY

## 2019-04-24 PROCEDURE — 25010000002 EPINEPHRINE PER 0.1 MG: Performed by: ANESTHESIOLOGY

## 2019-04-24 PROCEDURE — 82962 GLUCOSE BLOOD TEST: CPT

## 2019-04-24 PROCEDURE — 25010000002 VANCOMYCIN 1 G RECONSTITUTED SOLUTION

## 2019-04-24 PROCEDURE — 85384 FIBRINOGEN ACTIVITY: CPT | Performed by: THORACIC SURGERY (CARDIOTHORACIC VASCULAR SURGERY)

## 2019-04-24 PROCEDURE — 25010000002 METOCLOPRAMIDE PER 10 MG: Performed by: THORACIC SURGERY (CARDIOTHORACIC VASCULAR SURGERY)

## 2019-04-24 PROCEDURE — 33533 CABG ARTERIAL SINGLE: CPT | Performed by: PHYSICIAN ASSISTANT

## 2019-04-24 PROCEDURE — 94002 VENT MGMT INPAT INIT DAY: CPT

## 2019-04-24 PROCEDURE — 25010000002 HEPARIN (PORCINE) PER 1000 UNITS: Performed by: THORACIC SURGERY (CARDIOTHORACIC VASCULAR SURGERY)

## 2019-04-24 PROCEDURE — 25010000002 ALBUMIN HUMAN 25% PER 50 ML

## 2019-04-24 PROCEDURE — 25010000002 AMIODARONE IN DEXTROSE 5% 360-4.14 MG/200ML-% SOLUTION: Performed by: ANESTHESIOLOGY

## 2019-04-24 PROCEDURE — 85025 COMPLETE CBC W/AUTO DIFF WBC: CPT | Performed by: INTERNAL MEDICINE

## 2019-04-24 PROCEDURE — 93005 ELECTROCARDIOGRAM TRACING: CPT | Performed by: THORACIC SURGERY (CARDIOTHORACIC VASCULAR SURGERY)

## 2019-04-24 PROCEDURE — 25010000002 PROPOFOL 1000 MG/ML EMULSION: Performed by: THORACIC SURGERY (CARDIOTHORACIC VASCULAR SURGERY)

## 2019-04-24 PROCEDURE — 25010000002 PAPAVERINE PER 60 MG: Performed by: THORACIC SURGERY (CARDIOTHORACIC VASCULAR SURGERY)

## 2019-04-24 PROCEDURE — 85018 HEMOGLOBIN: CPT

## 2019-04-24 PROCEDURE — 99232 SBSQ HOSP IP/OBS MODERATE 35: CPT | Performed by: NURSE PRACTITIONER

## 2019-04-24 PROCEDURE — 82803 BLOOD GASES ANY COMBINATION: CPT

## 2019-04-24 PROCEDURE — 02100AW BYPASS CORONARY ARTERY, ONE ARTERY FROM AORTA WITH AUTOLOGOUS ARTERIAL TISSUE, OPEN APPROACH: ICD-10-PCS | Performed by: THORACIC SURGERY (CARDIOTHORACIC VASCULAR SURGERY)

## 2019-04-24 PROCEDURE — 25010000002 NEOSTIGMINE 5 MG/10ML SOLUTION: Performed by: ANESTHESIOLOGY

## 2019-04-24 PROCEDURE — P9047 ALBUMIN (HUMAN), 25%, 50ML: HCPCS

## 2019-04-24 PROCEDURE — P9041 ALBUMIN (HUMAN),5%, 50ML: HCPCS | Performed by: THORACIC SURGERY (CARDIOTHORACIC VASCULAR SURGERY)

## 2019-04-24 PROCEDURE — 85610 PROTHROMBIN TIME: CPT | Performed by: THORACIC SURGERY (CARDIOTHORACIC VASCULAR SURGERY)

## 2019-04-24 PROCEDURE — C1713 ANCHOR/SCREW BN/BN,TIS/BN: HCPCS | Performed by: THORACIC SURGERY (CARDIOTHORACIC VASCULAR SURGERY)

## 2019-04-24 DEVICE — SS SUTURE, 4 PER SLEEVE
Type: IMPLANTABLE DEVICE | Site: STERNUM | Status: FUNCTIONAL
Brand: MYO/WIRE II

## 2019-04-24 RX ORDER — MILRINONE LACTATE 0.2 MG/ML
.25-.75 INJECTION, SOLUTION INTRAVENOUS CONTINUOUS PRN
Status: DISCONTINUED | OUTPATIENT
Start: 2019-04-24 | End: 2019-04-25

## 2019-04-24 RX ORDER — NITROGLYCERIN 20 MG/100ML
5-200 INJECTION INTRAVENOUS
Status: DISCONTINUED | OUTPATIENT
Start: 2019-04-24 | End: 2019-04-25

## 2019-04-24 RX ORDER — POTASSIUM CHLORIDE 29.8 MG/ML
20 INJECTION INTRAVENOUS
Status: DISCONTINUED | OUTPATIENT
Start: 2019-04-24 | End: 2019-05-01 | Stop reason: HOSPADM

## 2019-04-24 RX ORDER — BISACODYL 10 MG
10 SUPPOSITORY, RECTAL RECTAL DAILY PRN
Status: DISCONTINUED | OUTPATIENT
Start: 2019-04-25 | End: 2019-05-01 | Stop reason: HOSPADM

## 2019-04-24 RX ORDER — ALPRAZOLAM 0.25 MG/1
0.25 TABLET ORAL EVERY 8 HOURS PRN
Status: DISCONTINUED | OUTPATIENT
Start: 2019-04-24 | End: 2019-04-25

## 2019-04-24 RX ORDER — GLYCOPYRROLATE 0.2 MG/ML
INJECTION INTRAMUSCULAR; INTRAVENOUS AS NEEDED
Status: DISCONTINUED | OUTPATIENT
Start: 2019-04-24 | End: 2019-04-24 | Stop reason: SURG

## 2019-04-24 RX ORDER — SENNA AND DOCUSATE SODIUM 50; 8.6 MG/1; MG/1
2 TABLET, FILM COATED ORAL NIGHTLY
Status: DISCONTINUED | OUTPATIENT
Start: 2019-04-25 | End: 2019-05-01 | Stop reason: HOSPADM

## 2019-04-24 RX ORDER — POTASSIUM CHLORIDE 7.45 MG/ML
10 INJECTION INTRAVENOUS
Status: DISCONTINUED | OUTPATIENT
Start: 2019-04-24 | End: 2019-05-01 | Stop reason: HOSPADM

## 2019-04-24 RX ORDER — ALBUMIN, HUMAN INJ 5% 5 %
1500 SOLUTION INTRAVENOUS AS NEEDED
Status: DISCONTINUED | OUTPATIENT
Start: 2019-04-24 | End: 2019-04-25

## 2019-04-24 RX ORDER — ACETAMINOPHEN 650 MG/1
650 SUPPOSITORY RECTAL EVERY 4 HOURS PRN
Status: DISCONTINUED | OUTPATIENT
Start: 2019-04-24 | End: 2019-04-28

## 2019-04-24 RX ORDER — PANTOPRAZOLE SODIUM 40 MG/1
40 TABLET, DELAYED RELEASE ORAL
Status: DISCONTINUED | OUTPATIENT
Start: 2019-04-25 | End: 2019-05-01 | Stop reason: HOSPADM

## 2019-04-24 RX ORDER — CYCLOBENZAPRINE HCL 10 MG
10 TABLET ORAL EVERY 8 HOURS PRN
Status: DISCONTINUED | OUTPATIENT
Start: 2019-04-25 | End: 2019-04-26

## 2019-04-24 RX ORDER — HEPARIN SODIUM 1000 [USP'U]/ML
INJECTION, SOLUTION INTRAVENOUS; SUBCUTANEOUS AS NEEDED
Status: DISCONTINUED | OUTPATIENT
Start: 2019-04-24 | End: 2019-04-24 | Stop reason: SURG

## 2019-04-24 RX ORDER — DEXMEDETOMIDINE HYDROCHLORIDE 4 UG/ML
INJECTION, SOLUTION INTRAVENOUS
Status: COMPLETED
Start: 2019-04-24 | End: 2019-04-24

## 2019-04-24 RX ORDER — ONDANSETRON 2 MG/ML
INJECTION INTRAMUSCULAR; INTRAVENOUS AS NEEDED
Status: DISCONTINUED | OUTPATIENT
Start: 2019-04-24 | End: 2019-04-24 | Stop reason: SURG

## 2019-04-24 RX ORDER — FENTANYL CITRATE 50 UG/ML
INJECTION, SOLUTION INTRAMUSCULAR; INTRAVENOUS AS NEEDED
Status: DISCONTINUED | OUTPATIENT
Start: 2019-04-24 | End: 2019-04-24 | Stop reason: SURG

## 2019-04-24 RX ORDER — SODIUM CHLORIDE 9 MG/ML
30 INJECTION, SOLUTION INTRAVENOUS CONTINUOUS PRN
Status: DISCONTINUED | OUTPATIENT
Start: 2019-04-24 | End: 2019-04-25

## 2019-04-24 RX ORDER — ACETAMINOPHEN 325 MG/1
650 TABLET ORAL EVERY 4 HOURS PRN
Status: DISCONTINUED | OUTPATIENT
Start: 2019-04-24 | End: 2019-05-01 | Stop reason: HOSPADM

## 2019-04-24 RX ORDER — MORPHINE SULFATE 2 MG/ML
1 INJECTION, SOLUTION INTRAMUSCULAR; INTRAVENOUS EVERY 4 HOURS PRN
Status: DISCONTINUED | OUTPATIENT
Start: 2019-04-24 | End: 2019-04-25

## 2019-04-24 RX ORDER — MIDAZOLAM HYDROCHLORIDE 1 MG/ML
2 INJECTION INTRAMUSCULAR; INTRAVENOUS
Status: DISCONTINUED | OUTPATIENT
Start: 2019-04-24 | End: 2019-04-25

## 2019-04-24 RX ORDER — VANCOMYCIN HYDROCHLORIDE 1 G/20ML
INJECTION, POWDER, LYOPHILIZED, FOR SOLUTION INTRAVENOUS AS NEEDED
Status: DISCONTINUED | OUTPATIENT
Start: 2019-04-24 | End: 2019-04-24 | Stop reason: HOSPADM

## 2019-04-24 RX ORDER — CEFAZOLIN SODIUM IN 0.9 % NACL 3 G/100 ML
3 INTRAVENOUS SOLUTION, PIGGYBACK (ML) INTRAVENOUS EVERY 8 HOURS
Status: COMPLETED | OUTPATIENT
Start: 2019-04-24 | End: 2019-04-26

## 2019-04-24 RX ORDER — KETAMINE HYDROCHLORIDE 10 MG/ML
INJECTION INTRAMUSCULAR; INTRAVENOUS AS NEEDED
Status: DISCONTINUED | OUTPATIENT
Start: 2019-04-24 | End: 2019-04-24 | Stop reason: SURG

## 2019-04-24 RX ORDER — POTASSIUM CHLORIDE 750 MG/1
40 CAPSULE, EXTENDED RELEASE ORAL AS NEEDED
Status: DISCONTINUED | OUTPATIENT
Start: 2019-04-24 | End: 2019-05-01 | Stop reason: HOSPADM

## 2019-04-24 RX ORDER — PROPOFOL 10 MG/ML
VIAL (ML) INTRAVENOUS CONTINUOUS PRN
Status: DISCONTINUED | OUTPATIENT
Start: 2019-04-24 | End: 2019-04-24 | Stop reason: SURG

## 2019-04-24 RX ORDER — PROMETHAZINE HYDROCHLORIDE 25 MG/ML
12.5 INJECTION, SOLUTION INTRAMUSCULAR; INTRAVENOUS EVERY 6 HOURS PRN
Status: DISCONTINUED | OUTPATIENT
Start: 2019-04-24 | End: 2019-04-25

## 2019-04-24 RX ORDER — HEPARIN SODIUM 5000 [USP'U]/ML
INJECTION, SOLUTION INTRAVENOUS; SUBCUTANEOUS AS NEEDED
Status: DISCONTINUED | OUTPATIENT
Start: 2019-04-24 | End: 2019-04-24 | Stop reason: HOSPADM

## 2019-04-24 RX ORDER — METOCLOPRAMIDE HYDROCHLORIDE 5 MG/ML
10 INJECTION INTRAMUSCULAR; INTRAVENOUS EVERY 6 HOURS
Status: DISCONTINUED | OUTPATIENT
Start: 2019-04-24 | End: 2019-04-25

## 2019-04-24 RX ORDER — SODIUM CHLORIDE 0.9 % (FLUSH) 0.9 %
30 SYRINGE (ML) INJECTION ONCE AS NEEDED
Status: DISCONTINUED | OUTPATIENT
Start: 2019-04-24 | End: 2019-05-01 | Stop reason: HOSPADM

## 2019-04-24 RX ORDER — PROPOFOL 10 MG/ML
VIAL (ML) INTRAVENOUS AS NEEDED
Status: DISCONTINUED | OUTPATIENT
Start: 2019-04-24 | End: 2019-04-24 | Stop reason: SURG

## 2019-04-24 RX ORDER — CHLORHEXIDINE GLUCONATE 0.12 MG/ML
15 RINSE ORAL EVERY 12 HOURS
Status: DISCONTINUED | OUTPATIENT
Start: 2019-04-24 | End: 2019-04-27

## 2019-04-24 RX ORDER — MEPERIDINE HYDROCHLORIDE 25 MG/ML
25 INJECTION INTRAMUSCULAR; INTRAVENOUS; SUBCUTANEOUS EVERY 4 HOURS PRN
Status: DISCONTINUED | OUTPATIENT
Start: 2019-04-24 | End: 2019-04-25

## 2019-04-24 RX ORDER — BISACODYL 5 MG/1
10 TABLET, DELAYED RELEASE ORAL DAILY PRN
Status: DISCONTINUED | OUTPATIENT
Start: 2019-04-24 | End: 2019-05-01 | Stop reason: HOSPADM

## 2019-04-24 RX ORDER — MORPHINE SULFATE 2 MG/ML
4 INJECTION, SOLUTION INTRAMUSCULAR; INTRAVENOUS
Status: DISCONTINUED | OUTPATIENT
Start: 2019-04-24 | End: 2019-04-25

## 2019-04-24 RX ORDER — ONDANSETRON 2 MG/ML
4 INJECTION INTRAMUSCULAR; INTRAVENOUS EVERY 6 HOURS PRN
Status: DISCONTINUED | OUTPATIENT
Start: 2019-04-24 | End: 2019-05-01 | Stop reason: HOSPADM

## 2019-04-24 RX ORDER — MAGNESIUM SULFATE 1 G/100ML
1 INJECTION INTRAVENOUS EVERY 8 HOURS
Status: DISCONTINUED | OUTPATIENT
Start: 2019-04-24 | End: 2019-04-25

## 2019-04-24 RX ORDER — NOREPINEPHRINE BITARTRATE 1 MG/ML
INJECTION, SOLUTION INTRAVENOUS CONTINUOUS PRN
Status: DISCONTINUED | OUTPATIENT
Start: 2019-04-24 | End: 2019-04-24 | Stop reason: SURG

## 2019-04-24 RX ORDER — ASPIRIN 81 MG/1
81 TABLET ORAL DAILY
Status: DISCONTINUED | OUTPATIENT
Start: 2019-04-25 | End: 2019-04-26

## 2019-04-24 RX ORDER — FUROSEMIDE 10 MG/ML
40 INJECTION INTRAMUSCULAR; INTRAVENOUS EVERY 6 HOURS PRN
Status: DISCONTINUED | OUTPATIENT
Start: 2019-04-24 | End: 2019-04-25

## 2019-04-24 RX ORDER — SUCCINYLCHOLINE CHLORIDE 20 MG/ML
INJECTION INTRAMUSCULAR; INTRAVENOUS AS NEEDED
Status: DISCONTINUED | OUTPATIENT
Start: 2019-04-24 | End: 2019-04-24 | Stop reason: SURG

## 2019-04-24 RX ORDER — HYDROCODONE BITARTRATE AND ACETAMINOPHEN 5; 325 MG/1; MG/1
2 TABLET ORAL EVERY 4 HOURS PRN
Status: DISCONTINUED | OUTPATIENT
Start: 2019-04-24 | End: 2019-05-01 | Stop reason: HOSPADM

## 2019-04-24 RX ORDER — MAGNESIUM HYDROXIDE 1200 MG/15ML
LIQUID ORAL AS NEEDED
Status: DISCONTINUED | OUTPATIENT
Start: 2019-04-24 | End: 2019-04-24 | Stop reason: HOSPADM

## 2019-04-24 RX ORDER — SODIUM CHLORIDE 9 MG/ML
INJECTION, SOLUTION INTRAVENOUS CONTINUOUS PRN
Status: DISCONTINUED | OUTPATIENT
Start: 2019-04-24 | End: 2019-04-24 | Stop reason: SURG

## 2019-04-24 RX ORDER — DEXMEDETOMIDINE HYDROCHLORIDE 4 UG/ML
.2-1.5 INJECTION, SOLUTION INTRAVENOUS
Status: DISCONTINUED | OUTPATIENT
Start: 2019-04-24 | End: 2019-04-25

## 2019-04-24 RX ORDER — DOPAMINE HYDROCHLORIDE 160 MG/100ML
2-20 INJECTION, SOLUTION INTRAVENOUS CONTINUOUS PRN
Status: DISCONTINUED | OUTPATIENT
Start: 2019-04-24 | End: 2019-04-25

## 2019-04-24 RX ORDER — OXYCODONE HYDROCHLORIDE 5 MG/1
10 TABLET ORAL EVERY 4 HOURS PRN
Status: DISCONTINUED | OUTPATIENT
Start: 2019-04-24 | End: 2019-04-25

## 2019-04-24 RX ORDER — NEOSTIGMINE METHYLSULFATE 0.5 MG/ML
INJECTION, SOLUTION INTRAVENOUS AS NEEDED
Status: DISCONTINUED | OUTPATIENT
Start: 2019-04-24 | End: 2019-04-24 | Stop reason: SURG

## 2019-04-24 RX ORDER — SODIUM CHLORIDE 9 MG/ML
30 INJECTION, SOLUTION INTRAVENOUS CONTINUOUS
Status: DISCONTINUED | OUTPATIENT
Start: 2019-04-24 | End: 2019-04-25

## 2019-04-24 RX ORDER — POTASSIUM CHLORIDE 1.5 G/1.77G
40 POWDER, FOR SOLUTION ORAL AS NEEDED
Status: DISCONTINUED | OUTPATIENT
Start: 2019-04-24 | End: 2019-05-01 | Stop reason: HOSPADM

## 2019-04-24 RX ORDER — ROCURONIUM BROMIDE 10 MG/ML
INJECTION, SOLUTION INTRAVENOUS AS NEEDED
Status: DISCONTINUED | OUTPATIENT
Start: 2019-04-24 | End: 2019-04-24 | Stop reason: SURG

## 2019-04-24 RX ORDER — NALOXONE HCL 0.4 MG/ML
0.4 VIAL (ML) INJECTION
Status: DISCONTINUED | OUTPATIENT
Start: 2019-04-24 | End: 2019-04-25

## 2019-04-24 RX ORDER — PROTAMINE SULFATE 10 MG/ML
INJECTION, SOLUTION INTRAVENOUS AS NEEDED
Status: DISCONTINUED | OUTPATIENT
Start: 2019-04-24 | End: 2019-04-24 | Stop reason: SURG

## 2019-04-24 RX ORDER — VECURONIUM BROMIDE 20 MG/20ML
INJECTION, POWDER, LYOPHILIZED, FOR SOLUTION INTRAVENOUS AS NEEDED
Status: DISCONTINUED | OUTPATIENT
Start: 2019-04-24 | End: 2019-04-24 | Stop reason: SURG

## 2019-04-24 RX ORDER — MAGNESIUM SULFATE HEPTAHYDRATE 500 MG/ML
INJECTION, SOLUTION INTRAMUSCULAR; INTRAVENOUS AS NEEDED
Status: DISCONTINUED | OUTPATIENT
Start: 2019-04-24 | End: 2019-04-24 | Stop reason: SURG

## 2019-04-24 RX ORDER — AMINOCAPROIC ACID 250 MG/ML
INJECTION, SOLUTION INTRAVENOUS AS NEEDED
Status: DISCONTINUED | OUTPATIENT
Start: 2019-04-24 | End: 2019-04-24 | Stop reason: SURG

## 2019-04-24 RX ORDER — PROMETHAZINE HYDROCHLORIDE 25 MG/1
12.5 TABLET ORAL EVERY 6 HOURS PRN
Status: DISCONTINUED | OUTPATIENT
Start: 2019-04-24 | End: 2019-04-25

## 2019-04-24 RX ORDER — PAPAVERINE HYDROCHLORIDE 30 MG/ML
INJECTION INTRAMUSCULAR; INTRAVENOUS AS NEEDED
Status: DISCONTINUED | OUTPATIENT
Start: 2019-04-24 | End: 2019-04-24 | Stop reason: HOSPADM

## 2019-04-24 RX ADMIN — SODIUM CHLORIDE: 9 INJECTION, SOLUTION INTRAVENOUS at 09:30

## 2019-04-24 RX ADMIN — METOCLOPRAMIDE 10 MG: 5 INJECTION, SOLUTION INTRAMUSCULAR; INTRAVENOUS at 16:49

## 2019-04-24 RX ADMIN — ALBUMIN HUMAN 250 ML: 0.05 INJECTION, SOLUTION INTRAVENOUS at 20:08

## 2019-04-24 RX ADMIN — CETIRIZINE HYDROCHLORIDE 10 MG: 10 TABLET, FILM COATED ORAL at 08:32

## 2019-04-24 RX ADMIN — KETAMINE HYDROCHLORIDE 10 MG: 10 INJECTION INTRAMUSCULAR; INTRAVENOUS at 14:40

## 2019-04-24 RX ADMIN — SUCCINYLCHOLINE CHLORIDE 140 MG: 20 INJECTION, SOLUTION INTRAMUSCULAR; INTRAVENOUS; PARENTERAL at 10:07

## 2019-04-24 RX ADMIN — CHLORHEXIDINE GLUCONATE 15 ML: 1.2 RINSE ORAL at 16:49

## 2019-04-24 RX ADMIN — KETAMINE HYDROCHLORIDE 10 MG: 10 INJECTION INTRAMUSCULAR; INTRAVENOUS at 13:32

## 2019-04-24 RX ADMIN — ROCURONIUM BROMIDE 50 MG: 10 INJECTION INTRAVENOUS at 10:11

## 2019-04-24 RX ADMIN — AMINOCAPROIC ACID 10 G: 250 INJECTION, SOLUTION INTRAVENOUS at 14:43

## 2019-04-24 RX ADMIN — PROTAMINE SULFATE 500 MG: 10 INJECTION, SOLUTION INTRAVENOUS at 14:38

## 2019-04-24 RX ADMIN — FENTANYL CITRATE 50 MCG: 50 INJECTION, SOLUTION INTRAMUSCULAR; INTRAVENOUS at 10:07

## 2019-04-24 RX ADMIN — VECURONIUM BROMIDE 7 MG: 1 INJECTION, POWDER, LYOPHILIZED, FOR SOLUTION INTRAVENOUS at 14:54

## 2019-04-24 RX ADMIN — FENTANYL CITRATE 50 MCG: 50 INJECTION, SOLUTION INTRAMUSCULAR; INTRAVENOUS at 14:59

## 2019-04-24 RX ADMIN — MAGNESIUM SULFATE HEPTAHYDRATE 2 G: 500 INJECTION, SOLUTION INTRAMUSCULAR; INTRAVENOUS at 14:13

## 2019-04-24 RX ADMIN — FENTANYL CITRATE 100 MCG: 50 INJECTION, SOLUTION INTRAMUSCULAR; INTRAVENOUS at 14:58

## 2019-04-24 RX ADMIN — HEPARIN SODIUM 30000 UNITS: 1000 INJECTION, SOLUTION INTRAVENOUS; SUBCUTANEOUS at 12:02

## 2019-04-24 RX ADMIN — CEFAZOLIN 3 G: 1 INJECTION, POWDER, FOR SOLUTION INTRAMUSCULAR; INTRAVENOUS; PARENTERAL at 10:33

## 2019-04-24 RX ADMIN — CARVEDILOL 6.25 MG: 6.25 TABLET, FILM COATED ORAL at 08:31

## 2019-04-24 RX ADMIN — GLYCOPYRROLATE 0.6 MG: 0.2 INJECTION INTRAMUSCULAR; INTRAVENOUS at 15:55

## 2019-04-24 RX ADMIN — MUPIROCIN 1 APPLICATION: 20 OINTMENT TOPICAL at 08:32

## 2019-04-24 RX ADMIN — PROPOFOL 50 MCG/KG/MIN: 10 INJECTION, EMULSION INTRAVENOUS at 10:45

## 2019-04-24 RX ADMIN — AMIODARONE HYDROCHLORIDE 0.5 MG/MIN: 1.8 INJECTION, SOLUTION INTRAVENOUS at 15:24

## 2019-04-24 RX ADMIN — HEPARIN SODIUM 15.28 UNITS/KG/HR: 10000 INJECTION, SOLUTION INTRAVENOUS at 03:58

## 2019-04-24 RX ADMIN — SODIUM CHLORIDE 8.1 UNITS/HR: 9 INJECTION, SOLUTION INTRAVENOUS at 23:24

## 2019-04-24 RX ADMIN — KETAMINE HYDROCHLORIDE 10 MG: 10 INJECTION INTRAMUSCULAR; INTRAVENOUS at 16:18

## 2019-04-24 RX ADMIN — ROCURONIUM BROMIDE 50 MG: 10 INJECTION INTRAVENOUS at 13:04

## 2019-04-24 RX ADMIN — CEFAZOLIN 3 G: 1 INJECTION, POWDER, FOR SOLUTION INTRAMUSCULAR; INTRAVENOUS; PARENTERAL at 14:54

## 2019-04-24 RX ADMIN — NOREPINEPHRINE BITARTRATE 0.02 MCG/KG/MIN: 1 INJECTION, SOLUTION, CONCENTRATE INTRAVENOUS at 10:52

## 2019-04-24 RX ADMIN — PROPOFOL 50 MG: 10 INJECTION, EMULSION INTRAVENOUS at 10:08

## 2019-04-24 RX ADMIN — KETAMINE HYDROCHLORIDE 10 MG: 10 INJECTION INTRAMUSCULAR; INTRAVENOUS at 11:06

## 2019-04-24 RX ADMIN — ROCURONIUM BROMIDE 50 MG: 10 INJECTION INTRAVENOUS at 10:49

## 2019-04-24 RX ADMIN — EPINEPHRINE 0.03 MCG/KG/MIN: 1 INJECTION, SOLUTION, CONCENTRATE INTRAVENOUS at 14:22

## 2019-04-24 RX ADMIN — ASPIRIN 81 MG: 81 TABLET, DELAYED RELEASE ORAL at 08:32

## 2019-04-24 RX ADMIN — MUPIROCIN 1 APPLICATION: 20 OINTMENT TOPICAL at 20:04

## 2019-04-24 RX ADMIN — FENTANYL CITRATE 100 MCG: 50 INJECTION, SOLUTION INTRAMUSCULAR; INTRAVENOUS at 14:47

## 2019-04-24 RX ADMIN — SODIUM CHLORIDE: 9 INJECTION, SOLUTION INTRAVENOUS at 10:35

## 2019-04-24 RX ADMIN — HYDROCODONE BITARTRATE AND ACETAMINOPHEN 2 TABLET: 5; 325 TABLET ORAL at 23:02

## 2019-04-24 RX ADMIN — PROPOFOL 30 MCG/KG/MIN: 10 INJECTION, EMULSION INTRAVENOUS at 17:00

## 2019-04-24 RX ADMIN — FENTANYL CITRATE 100 MCG: 50 INJECTION, SOLUTION INTRAMUSCULAR; INTRAVENOUS at 15:01

## 2019-04-24 RX ADMIN — SODIUM CHLORIDE 0.4 MCG/KG/HR: 9 INJECTION, SOLUTION INTRAVENOUS at 16:26

## 2019-04-24 RX ADMIN — DEXMEDETOMIDINE HYDROCHLORIDE 0.4 MCG/KG/HR: 4 INJECTION, SOLUTION INTRAVENOUS at 16:26

## 2019-04-24 RX ADMIN — PROPOFOL 150 MG: 10 INJECTION, EMULSION INTRAVENOUS at 10:07

## 2019-04-24 RX ADMIN — HEPARIN SODIUM 5000 UNITS: 1000 INJECTION, SOLUTION INTRAVENOUS; SUBCUTANEOUS at 12:21

## 2019-04-24 RX ADMIN — HYDROCORTISONE SODIUM SUCCINATE 100 MG: 100 INJECTION, POWDER, FOR SOLUTION INTRAMUSCULAR; INTRAVENOUS at 14:22

## 2019-04-24 RX ADMIN — ALBUMIN HUMAN 250 ML: 0.05 INJECTION, SOLUTION INTRAVENOUS at 21:22

## 2019-04-24 RX ADMIN — ONDANSETRON 4 MG: 2 INJECTION INTRAMUSCULAR; INTRAVENOUS at 15:55

## 2019-04-24 RX ADMIN — SODIUM PHOSPHATE, MONOBASIC, MONOHYDRATE 20 MMOL: 276; 142 INJECTION, SOLUTION INTRAVENOUS at 17:51

## 2019-04-24 RX ADMIN — SODIUM CHLORIDE 2.4 UNITS/HR: 900 INJECTION, SOLUTION INTRAVENOUS at 10:59

## 2019-04-24 RX ADMIN — LISINOPRIL 20 MG: 20 TABLET ORAL at 08:32

## 2019-04-24 RX ADMIN — POTASSIUM CHLORIDE 40 MEQ: 750 CAPSULE, EXTENDED RELEASE ORAL at 06:38

## 2019-04-24 RX ADMIN — NEOSTIGMINE METHYLSULFATE 3 MG: 0.5 INJECTION, SOLUTION INTRAVENOUS at 15:55

## 2019-04-24 RX ADMIN — SODIUM CHLORIDE 3 G: 9 INJECTION, SOLUTION INTRAVENOUS at 22:29

## 2019-04-24 RX ADMIN — POTASSIUM CHLORIDE 20 MEQ: 750 CAPSULE, EXTENDED RELEASE ORAL at 08:36

## 2019-04-24 RX ADMIN — AMINOCAPROIC ACID 10 G: 250 INJECTION, SOLUTION INTRAVENOUS at 11:30

## 2019-04-24 RX ADMIN — SODIUM CHLORIDE, PRESERVATIVE FREE 3 ML: 5 INJECTION INTRAVENOUS at 08:36

## 2019-04-24 RX ADMIN — PHENYLEPHRINE HYDROCHLORIDE 0.5 MCG/KG/MIN: 10 INJECTION, SOLUTION INTRAMUSCULAR; INTRAVENOUS; SUBCUTANEOUS at 10:15

## 2019-04-24 RX ADMIN — CHLORHEXIDINE GLUCONATE 15 ML: 1.2 RINSE ORAL at 08:32

## 2019-04-24 RX ADMIN — SODIUM CHLORIDE 1 MCG/KG/HR: 900 INJECTION, SOLUTION INTRAVENOUS at 10:45

## 2019-04-24 RX ADMIN — KETAMINE HYDROCHLORIDE 10 MG: 10 INJECTION INTRAMUSCULAR; INTRAVENOUS at 10:14

## 2019-04-24 RX ADMIN — FENTANYL CITRATE 100 MCG: 50 INJECTION, SOLUTION INTRAMUSCULAR; INTRAVENOUS at 10:51

## 2019-04-24 NOTE — ANESTHESIA PROCEDURE NOTES
Arterial Line    Pre-sedation assessment completed: 4/24/2019 9:30 AM    Patient reassessed immediately prior to procedure    Patient location during procedure: holding area  Start time: 4/24/2019 9:47 AM  Stop Time:4/24/2019 9:55 AM       Line placed for hemodynamic monitoring, ABGs/Labs/ISTAT and MD/Surgeon request.  Performed By   Anesthesiologist: Cruzito Mendes MD  Preanesthetic Checklist  Completed: patient identified, site marked, surgical consent, pre-op evaluation, timeout performed, IV checked, risks and benefits discussed and monitors and equipment checked  Arterial Line Prep   Sterile Tech: cap, gloves, mask and sterile barriers  Prep: ChloraPrep  Patient monitoring: blood pressure monitoring, continuous pulse oximetry and EKG  Arterial Line Procedure   Laterality:left  Location:  radial artery  Catheter size: 20 G   Guidance: ultrasound guided  PROCEDURE NOTE/ULTRASOUND INTERPRETATION.  Using ultrasound guidance the potential vascular sites for insertion of the catheter were visualized to determine the patency of the vessel to be used for vascular access.  After selecting the appropriate site for insertion, the needle was visualized under ultrasound being inserted into the radial artery, followed by ultrasound confirmation of wire and catheter placement. There were no abnormalities seen on ultrasound; an image was taken; and the patient tolerated the procedure with no complications.   Number of attempts: 1  Successful placement: yes          Post Assessment   Dressing Type: wrist guard applied, occlusive dressing applied and secured with tape.   Complications no  Circ/Move/Sens Assessment: normal and unchanged.   Patient Tolerance: patient tolerated the procedure well with no apparent complications

## 2019-04-24 NOTE — ANESTHESIA PROCEDURE NOTES
Procedure Performed: Emergent/Open-Heart Anesthesia GUILLERMINA       Start Time:  4/24/2019 10:39 AM       End Time:   4/24/2019 10:52 AM    Preanesthesia Checklist:  Patient identified, IV assessed, risks and benefits discussed, monitors and equipment assessed, procedure being performed at surgeon's request and anesthesia consent obtained.    General Procedure Information  Diagnostic Indications for Echo:  assessment of ascending aorta, assessment of surgical repair and hemodynamic monitoring  Physician Requesting Echo: Sirisha Turner MD  CPT Code:  Mitral regurgitation non rhuematic  Location performed:  OR  Intubated  Bite block placed  Heart visualized  Probe Insertion:  Easy  Probe Type:  Multiplane  Modalities:  Color flow mapping, pulse wave Doppler and continuous wave Doppler    Echocardiographic and Doppler Measurements    Ventricles    Right Ventricle:  Thrombus not present.  Global function normal.    Left Ventricle:  Thrombus not present.  Global Function severely impaired.  Ejection Fraction 30%.          Valves    Aortic Valve:  Annulus normal.  Stenosis not present.  Regurgitation trace.  Leaflets normal.  Leaflet motions normal.      Mitral Valve:  Stenosis not present.  Vena Contracta Width: 0.6 cm.  Regurgitation moderate.  Leaflets normal.  Leaflet motions normal.      Tricuspid Valve:  Annulus dilated.  Stenosis not present.  Regurgitation mild.  Leaflets normal.  Leaflet motions normal.          Aorta    Ascending Aorta:  Size normal.  Diameter 3.3 cm.  Dissection not present.  Plaque thickness less than 3 mm.  Mobile plaque not present.    Aortic Arch:  Size normal.  Diameter 2.6 cm.  Dissection not present.  Plaque thickness less than 3 mm.  Mobile plaque not present.    Descending Aorta:  Size normal.  Diameter 2.3 cm.  Dissection not present.  Plaque thickness less than 3 mm.  Mobile plaque not present.          Atria    Right Atrium:  Spontaneous echo contrast present.  Thrombus not present.   Tumor not present.  Device not present.      Left Atrium:  Spontaneous echo contrast not present.  Thrombus not present.  Tumor not present.  Device not present.    Left atrial appendage normal.      Septa    Atrial Septum:  Intra-atrial septal morphology lipomatous hypertrophy.              Other Findings  Pericardium:  normal  Pleural Effusion:  none  Pulmonary Venous Flow:  blunted (decreased) systolic flow    Anesthesia Information  Performed Personally  Anesthesiologist:  Cruzito Mendes MD      Echocardiogram Comments:       Postbypass results:  Moderate MR no MS   No AI or AS  Mild TR no TS  LVEF improved to approx 35-40%  Via visual estimation  Normal RHF

## 2019-04-24 NOTE — ANESTHESIA PROCEDURE NOTES
Airway  Urgency: elective    Date/Time: 4/24/2019 10:09 AM  End Time:4/24/2019 10:09 AM  Airway not difficult    General Information and Staff    Patient location during procedure: OR  Anesthesiologist: Cruzito Mendes MD    Indications and Patient Condition  Indications for airway management: airway protection    Preoxygenated: yes  Mask difficulty assessment: 1 - vent by mask    Final Airway Details  Final airway type: endotracheal airway      Successful airway: ETT  Cuffed: yes   Successful intubation technique: direct laryngoscopy  Endotracheal tube insertion site: oral  Blade: CMAC  Blade size: D  ETT size (mm): 7.5  Cormack-Lehane Classification: grade I - full view of glottis  Placement verified by: chest auscultation   Measured from: lips  ETT to lips (cm): 23  Number of attempts at approach: 2    Additional Comments  First attempt with MAC blade 4 Grade 3 view  Second attempt with D blade CMAC successful

## 2019-04-24 NOTE — ANESTHESIA PROCEDURE NOTES
Central Line    Pre-sedation assessment completed: 4/24/2019 9:30 AM    Patient reassessed immediately prior to procedure    Patient location during procedure: OR  Start time: 4/24/2019 10:16 AM  Stop Time:4/24/2019 10:28 AM  Indications: vascular access  Staff  Anesthesiologist: Cruzito Mendes MD  Preanesthetic Checklist  Completed: patient identified, site marked, surgical consent, pre-op evaluation, timeout performed, IV checked, risks and benefits discussed and monitors and equipment checked  Central Line Prep  Sterile Tech:cap, gloves, gown, mask and sterile barriers  Prep: chloraprep  Patient monitoring: continuous pulse oximetry, blood pressure monitoring and EKG  Central Line Procedure  Laterality:right  Location:internal jugular  Catheter Type:Cordis and double lumen  Catheter Size:9 Fr  Guidance:ultrasound guided  PROCEDURE NOTE/ULTRASOUND INTERPRETATION.  Using ultrasound guidance the potential vascular sites for insertion of the catheter were visualized to determine the patency of the vessel to be used for vascular access.  After selecting the appropriate site for insertion, the needle was visualized under ultrasound being inserted into the internal jugular vein, followed by ultrasound confirmation of wire and catheter placement. There were no abnormalities seen on ultrasound; an image was taken; and the patient tolerated the procedure with no complications.   Assessment  Post procedure:biopatch applied, line sutured and occlusive dressing applied  Assessement:blood return through all ports, free fluid flow, placement verified by x-ray, Keven Test, no pneumothorax on x-ray and chest x-ray ordered  Complications:no  Patient Tolerance:patient tolerated the procedure well with no apparent complications

## 2019-04-24 NOTE — ANESTHESIA PROCEDURE NOTES
Central Line    Pre-sedation assessment completed: 4/24/2019 9:30 AM    Patient reassessed immediately prior to procedure    Patient location during procedure: OR  Start time: 4/24/2019 10:28 AM  Stop Time:4/24/2019 10:33 AM  Indications: central pressure monitoring  Staff  Anesthesiologist: Cruzito Mendes MD  Preanesthetic Checklist  Completed: patient identified, site marked, surgical consent, pre-op evaluation, timeout performed, IV checked, risks and benefits discussed and monitors and equipment checked  Central Line Prep  Sterile Tech:cap, gloves, gown, mask and sterile barriers  Prep: chloraprep  Patient monitoring: blood pressure monitoring, continuous pulse oximetry and EKG  Central Line Procedure  Laterality:right  Location:internal jugular  Catheter Type:[place]  Assessment  Post procedure:line sutured, biopatch applied and occlusive dressing applied  Assessement:chest x-ray ordered, no pneumothorax on x-ray and free fluid flow  Complications:no  Patient Tolerance:patient tolerated the procedure well with no apparent complications  Additional Notes  Secured at 50 cm

## 2019-04-25 ENCOUNTER — APPOINTMENT (OUTPATIENT)
Dept: GENERAL RADIOLOGY | Facility: HOSPITAL | Age: 54
End: 2019-04-25

## 2019-04-25 LAB
ALBUMIN SERPL-MCNC: 3.5 G/DL (ref 3.5–5.2)
ANION GAP SERPL CALCULATED.3IONS-SCNC: 10.9 MMOL/L
ARTERIAL PATENCY WRIST A: POSITIVE
ATMOSPHERIC PRESS: 741.7 MMHG
BACTERIA SPEC AEROBE CULT: NORMAL
BACTERIA SPEC AEROBE CULT: NORMAL
BASE EXCESS BLDA CALC-SCNC: -1.6 MMOL/L (ref 0–2)
BASOPHILS # BLD AUTO: 0.02 10*3/MM3 (ref 0–0.2)
BASOPHILS # BLD AUTO: 0.03 10*3/MM3 (ref 0–0.2)
BASOPHILS NFR BLD AUTO: 0.2 % (ref 0–1.5)
BASOPHILS NFR BLD AUTO: 0.2 % (ref 0–1.5)
BDY SITE: ABNORMAL
BUN BLD-MCNC: 19 MG/DL (ref 6–20)
BUN/CREAT SERPL: 19 (ref 7–25)
CALCIUM SPEC-SCNC: 8.3 MG/DL (ref 8.6–10.5)
CHLORIDE SERPL-SCNC: 110 MMOL/L (ref 98–107)
CO2 SERPL-SCNC: 22.1 MMOL/L (ref 22–29)
CREAT BLD-MCNC: 1 MG/DL (ref 0.76–1.27)
DEPRECATED RDW RBC AUTO: 49 FL (ref 37–54)
DEPRECATED RDW RBC AUTO: 52.3 FL (ref 37–54)
EOSINOPHIL # BLD AUTO: 0.01 10*3/MM3 (ref 0–0.4)
EOSINOPHIL # BLD AUTO: 0.08 10*3/MM3 (ref 0–0.4)
EOSINOPHIL NFR BLD AUTO: 0.1 % (ref 0.3–6.2)
EOSINOPHIL NFR BLD AUTO: 0.8 % (ref 0.3–6.2)
ERYTHROCYTE [DISTWIDTH] IN BLOOD BY AUTOMATED COUNT: 15.2 % (ref 12.3–15.4)
ERYTHROCYTE [DISTWIDTH] IN BLOOD BY AUTOMATED COUNT: 15.7 % (ref 12.3–15.4)
GAS FLOW AIRWAY: 4 LPM
GFR SERPL CREATININE-BSD FRML MDRD: 78 ML/MIN/1.73
GLUCOSE BLD-MCNC: 121 MG/DL (ref 65–99)
GLUCOSE BLDC GLUCOMTR-MCNC: 111 MG/DL (ref 70–130)
GLUCOSE BLDC GLUCOMTR-MCNC: 112 MG/DL (ref 70–130)
GLUCOSE BLDC GLUCOMTR-MCNC: 112 MG/DL (ref 70–130)
GLUCOSE BLDC GLUCOMTR-MCNC: 114 MG/DL (ref 70–130)
GLUCOSE BLDC GLUCOMTR-MCNC: 114 MG/DL (ref 70–130)
GLUCOSE BLDC GLUCOMTR-MCNC: 122 MG/DL (ref 70–130)
GLUCOSE BLDC GLUCOMTR-MCNC: 128 MG/DL (ref 70–130)
GLUCOSE BLDC GLUCOMTR-MCNC: 156 MG/DL (ref 70–130)
GLUCOSE BLDC GLUCOMTR-MCNC: 173 MG/DL (ref 70–130)
GLUCOSE BLDC GLUCOMTR-MCNC: 174 MG/DL (ref 70–130)
GLUCOSE BLDC GLUCOMTR-MCNC: 70 MG/DL (ref 70–130)
GLUCOSE BLDC GLUCOMTR-MCNC: 95 MG/DL (ref 70–130)
HCO3 BLDA-SCNC: 23 MMOL/L (ref 22–28)
HCT VFR BLD AUTO: 29.4 % (ref 37.5–51)
HCT VFR BLD AUTO: 33.6 % (ref 37.5–51)
HGB BLD-MCNC: 11 G/DL (ref 13–17.7)
HGB BLD-MCNC: 9.1 G/DL (ref 13–17.7)
IMM GRANULOCYTES # BLD AUTO: 0.06 10*3/MM3 (ref 0–0.05)
IMM GRANULOCYTES # BLD AUTO: 0.08 10*3/MM3 (ref 0–0.05)
IMM GRANULOCYTES NFR BLD AUTO: 0.6 % (ref 0–0.5)
IMM GRANULOCYTES NFR BLD AUTO: 0.6 % (ref 0–0.5)
INR PPP: 1.17 (ref 0.9–1.1)
LYMPHOCYTES # BLD AUTO: 1.43 10*3/MM3 (ref 0.7–3.1)
LYMPHOCYTES # BLD AUTO: 1.51 10*3/MM3 (ref 0.7–3.1)
LYMPHOCYTES NFR BLD AUTO: 10.6 % (ref 19.6–45.3)
LYMPHOCYTES NFR BLD AUTO: 14.4 % (ref 19.6–45.3)
MAGNESIUM SERPL-MCNC: 2.6 MG/DL (ref 1.6–2.6)
MCH RBC QN AUTO: 28.4 PG (ref 26.6–33)
MCH RBC QN AUTO: 28.9 PG (ref 26.6–33)
MCHC RBC AUTO-ENTMCNC: 31 G/DL (ref 31.5–35.7)
MCHC RBC AUTO-ENTMCNC: 32.7 G/DL (ref 31.5–35.7)
MCV RBC AUTO: 88.2 FL (ref 79–97)
MCV RBC AUTO: 91.9 FL (ref 79–97)
MODALITY: ABNORMAL
MONOCYTES # BLD AUTO: 0.98 10*3/MM3 (ref 0.1–0.9)
MONOCYTES # BLD AUTO: 1.5 10*3/MM3 (ref 0.1–0.9)
MONOCYTES NFR BLD AUTO: 10.5 % (ref 5–12)
MONOCYTES NFR BLD AUTO: 9.9 % (ref 5–12)
NEUTROPHILS # BLD AUTO: 11.11 10*3/MM3 (ref 1.7–7)
NEUTROPHILS # BLD AUTO: 7.35 10*3/MM3 (ref 1.7–7)
NEUTROPHILS NFR BLD AUTO: 74.1 % (ref 42.7–76)
NEUTROPHILS NFR BLD AUTO: 78 % (ref 42.7–76)
NRBC BLD AUTO-RTO: 0 /100 WBC (ref 0–0.2)
NRBC BLD AUTO-RTO: 0 /100 WBC (ref 0–0.2)
PCO2 BLDA: 37.3 MM HG (ref 35–45)
PH BLDA: 7.4 PH UNITS (ref 7.35–7.45)
PHOSPHATE SERPL-MCNC: 3.8 MG/DL (ref 2.5–4.5)
PLATELET # BLD AUTO: 152 10*3/MM3 (ref 140–450)
PLATELET # BLD AUTO: 219 10*3/MM3 (ref 140–450)
PMV BLD AUTO: 9.3 FL (ref 6–12)
PMV BLD AUTO: 9.5 FL (ref 6–12)
PO2 BLDA: 77 MM HG (ref 80–100)
POTASSIUM BLD-SCNC: 4.3 MMOL/L (ref 3.5–5.2)
PROTHROMBIN TIME: 14.6 SECONDS (ref 11.7–14.2)
RBC # BLD AUTO: 3.2 10*6/MM3 (ref 4.14–5.8)
RBC # BLD AUTO: 3.81 10*6/MM3 (ref 4.14–5.8)
SAO2 % BLDCOA: 95.3 % (ref 92–99)
SODIUM BLD-SCNC: 143 MMOL/L (ref 136–145)
TOTAL RATE: 24 BREATHS/MINUTE
WBC NRBC COR # BLD: 14.24 10*3/MM3 (ref 3.4–10.8)
WBC NRBC COR # BLD: 9.92 10*3/MM3 (ref 3.4–10.8)

## 2019-04-25 PROCEDURE — 83735 ASSAY OF MAGNESIUM: CPT | Performed by: THORACIC SURGERY (CARDIOTHORACIC VASCULAR SURGERY)

## 2019-04-25 PROCEDURE — 93010 ELECTROCARDIOGRAM REPORT: CPT | Performed by: INTERNAL MEDICINE

## 2019-04-25 PROCEDURE — 82962 GLUCOSE BLOOD TEST: CPT

## 2019-04-25 PROCEDURE — 71045 X-RAY EXAM CHEST 1 VIEW: CPT

## 2019-04-25 PROCEDURE — 25010000002 ALBUMIN HUMAN 5% PER 50 ML

## 2019-04-25 PROCEDURE — 94799 UNLISTED PULMONARY SVC/PX: CPT

## 2019-04-25 PROCEDURE — 99232 SBSQ HOSP IP/OBS MODERATE 35: CPT | Performed by: INTERNAL MEDICINE

## 2019-04-25 PROCEDURE — 85025 COMPLETE CBC W/AUTO DIFF WBC: CPT | Performed by: THORACIC SURGERY (CARDIOTHORACIC VASCULAR SURGERY)

## 2019-04-25 PROCEDURE — 25010000002 AMIODARONE IN DEXTROSE 5% 360-4.14 MG/200ML-% SOLUTION: Performed by: THORACIC SURGERY (CARDIOTHORACIC VASCULAR SURGERY)

## 2019-04-25 PROCEDURE — 25010000002 ENOXAPARIN PER 10 MG: Performed by: NURSE PRACTITIONER

## 2019-04-25 PROCEDURE — 25010000002 ALBUMIN HUMAN 5% PER 50 ML: Performed by: THORACIC SURGERY (CARDIOTHORACIC VASCULAR SURGERY)

## 2019-04-25 PROCEDURE — 25010000003 EPINEPHRINE 30 MG/30ML SOLUTION 30 ML VIAL: Performed by: THORACIC SURGERY (CARDIOTHORACIC VASCULAR SURGERY)

## 2019-04-25 PROCEDURE — 25010000002 HYDROCORTISONE SODIUM SUCCINATE 100 MG RECONSTITUTED SOLUTION: Performed by: NURSE PRACTITIONER

## 2019-04-25 PROCEDURE — P9041 ALBUMIN (HUMAN),5%, 50ML: HCPCS | Performed by: THORACIC SURGERY (CARDIOTHORACIC VASCULAR SURGERY)

## 2019-04-25 PROCEDURE — 25010000002 METOCLOPRAMIDE PER 10 MG: Performed by: THORACIC SURGERY (CARDIOTHORACIC VASCULAR SURGERY)

## 2019-04-25 PROCEDURE — 25010000002 MAGNESIUM SULFATE IN D5W 1G/100ML (PREMIX) 1-5 GM/100ML-% SOLUTION: Performed by: THORACIC SURGERY (CARDIOTHORACIC VASCULAR SURGERY)

## 2019-04-25 PROCEDURE — P9041 ALBUMIN (HUMAN),5%, 50ML: HCPCS

## 2019-04-25 PROCEDURE — 82803 BLOOD GASES ANY COMBINATION: CPT

## 2019-04-25 PROCEDURE — 93005 ELECTROCARDIOGRAM TRACING: CPT | Performed by: THORACIC SURGERY (CARDIOTHORACIC VASCULAR SURGERY)

## 2019-04-25 PROCEDURE — 25010000002 CEFAZOLIN PER 500 MG: Performed by: THORACIC SURGERY (CARDIOTHORACIC VASCULAR SURGERY)

## 2019-04-25 PROCEDURE — 85610 PROTHROMBIN TIME: CPT | Performed by: THORACIC SURGERY (CARDIOTHORACIC VASCULAR SURGERY)

## 2019-04-25 PROCEDURE — 36600 WITHDRAWAL OF ARTERIAL BLOOD: CPT

## 2019-04-25 PROCEDURE — 99024 POSTOP FOLLOW-UP VISIT: CPT | Performed by: NURSE PRACTITIONER

## 2019-04-25 PROCEDURE — 25010000002 PHENYLEPHRINE 10 MG/ML SOLUTION 5 ML VIAL: Performed by: THORACIC SURGERY (CARDIOTHORACIC VASCULAR SURGERY)

## 2019-04-25 PROCEDURE — 63710000001 INSULIN LISPRO (HUMAN) PER 5 UNITS: Performed by: INTERNAL MEDICINE

## 2019-04-25 PROCEDURE — 63710000001 INSULIN GLARGINE PER 5 UNITS: Performed by: INTERNAL MEDICINE

## 2019-04-25 PROCEDURE — 25010000002 ONDANSETRON PER 1 MG: Performed by: THORACIC SURGERY (CARDIOTHORACIC VASCULAR SURGERY)

## 2019-04-25 PROCEDURE — 80069 RENAL FUNCTION PANEL: CPT | Performed by: THORACIC SURGERY (CARDIOTHORACIC VASCULAR SURGERY)

## 2019-04-25 RX ORDER — BUDESONIDE AND FORMOTEROL FUMARATE DIHYDRATE 160; 4.5 UG/1; UG/1
2 AEROSOL RESPIRATORY (INHALATION)
Status: DISCONTINUED | OUTPATIENT
Start: 2019-04-25 | End: 2019-05-01 | Stop reason: HOSPADM

## 2019-04-25 RX ORDER — ALBUMIN, HUMAN INJ 5% 5 %
500 SOLUTION INTRAVENOUS ONCE
Status: COMPLETED | OUTPATIENT
Start: 2019-04-25 | End: 2019-04-25

## 2019-04-25 RX ORDER — ALBUMIN, HUMAN INJ 5% 5 %
SOLUTION INTRAVENOUS
Status: COMPLETED
Start: 2019-04-25 | End: 2019-04-25

## 2019-04-25 RX ORDER — CALCIUM CHLORIDE 100 MG/ML
1 INJECTION INTRAVENOUS; INTRAVENTRICULAR ONCE
Status: COMPLETED | OUTPATIENT
Start: 2019-04-25 | End: 2019-04-25

## 2019-04-25 RX ORDER — AMIODARONE HYDROCHLORIDE 200 MG/1
400 TABLET ORAL EVERY 12 HOURS SCHEDULED
Status: DISCONTINUED | OUTPATIENT
Start: 2019-04-25 | End: 2019-04-26

## 2019-04-25 RX ORDER — MONTELUKAST SODIUM 10 MG/1
10 TABLET ORAL NIGHTLY
Status: DISCONTINUED | OUTPATIENT
Start: 2019-04-25 | End: 2019-05-01 | Stop reason: HOSPADM

## 2019-04-25 RX ORDER — FLUTICASONE PROPIONATE 50 MCG
2 SPRAY, SUSPENSION (ML) NASAL DAILY PRN
Status: DISCONTINUED | OUTPATIENT
Start: 2019-04-25 | End: 2019-05-01 | Stop reason: HOSPADM

## 2019-04-25 RX ORDER — CALCIUM CHLORIDE 100 MG/ML
1 INJECTION INTRAVENOUS; INTRAVENTRICULAR ONCE
Status: DISCONTINUED | OUTPATIENT
Start: 2019-04-25 | End: 2019-04-25

## 2019-04-25 RX ORDER — ALPRAZOLAM 0.25 MG/1
0.25 TABLET ORAL EVERY 8 HOURS PRN
Status: DISCONTINUED | OUTPATIENT
Start: 2019-04-25 | End: 2019-05-01 | Stop reason: HOSPADM

## 2019-04-25 RX ORDER — INSULIN GLARGINE 100 [IU]/ML
25 INJECTION, SOLUTION SUBCUTANEOUS EVERY MORNING
Status: DISCONTINUED | OUTPATIENT
Start: 2019-04-25 | End: 2019-04-26

## 2019-04-25 RX ADMIN — HYDROCODONE BITARTRATE AND ACETAMINOPHEN 2 TABLET: 5; 325 TABLET ORAL at 03:11

## 2019-04-25 RX ADMIN — ALBUMIN, HUMAN INJ 5% 250 ML: 5 SOLUTION at 23:37

## 2019-04-25 RX ADMIN — EPINEPHRINE 0.03 MCG/KG/MIN: 1 INJECTION PARENTERAL at 05:52

## 2019-04-25 RX ADMIN — CHLORHEXIDINE GLUCONATE 15 ML: 1.2 RINSE ORAL at 05:05

## 2019-04-25 RX ADMIN — ONDANSETRON HYDROCHLORIDE 4 MG: 2 SOLUTION INTRAMUSCULAR; INTRAVENOUS at 17:02

## 2019-04-25 RX ADMIN — SENNOSIDES AND DOCUSATE SODIUM 2 TABLET: 8.6; 5 TABLET ORAL at 21:02

## 2019-04-25 RX ADMIN — HYDROCODONE BITARTRATE AND ACETAMINOPHEN 2 TABLET: 5; 325 TABLET ORAL at 12:23

## 2019-04-25 RX ADMIN — NOREPINEPHRINE BITARTRATE 0.05 MCG/KG/MIN: 1 INJECTION INTRAVENOUS at 04:40

## 2019-04-25 RX ADMIN — MONTELUKAST SODIUM 10 MG: 10 TABLET, FILM COATED ORAL at 21:03

## 2019-04-25 RX ADMIN — ALBUMIN HUMAN 500 ML: 0.05 INJECTION, SOLUTION INTRAVENOUS at 07:37

## 2019-04-25 RX ADMIN — INSULIN LISPRO 2 UNITS: 100 INJECTION, SOLUTION INTRAVENOUS; SUBCUTANEOUS at 18:01

## 2019-04-25 RX ADMIN — ENOXAPARIN SODIUM 40 MG: 40 INJECTION SUBCUTANEOUS at 18:01

## 2019-04-25 RX ADMIN — PANTOPRAZOLE SODIUM 40 MG: 40 TABLET, DELAYED RELEASE ORAL at 06:17

## 2019-04-25 RX ADMIN — INSULIN LISPRO 2 UNITS: 100 INJECTION, SOLUTION INTRAVENOUS; SUBCUTANEOUS at 21:03

## 2019-04-25 RX ADMIN — ALBUMIN HUMAN 250 ML: 0.05 INJECTION, SOLUTION INTRAVENOUS at 23:37

## 2019-04-25 RX ADMIN — AMIODARONE HYDROCHLORIDE 400 MG: 200 TABLET ORAL at 11:04

## 2019-04-25 RX ADMIN — AMIODARONE HYDROCHLORIDE 400 MG: 200 TABLET ORAL at 22:45

## 2019-04-25 RX ADMIN — ASPIRIN 81 MG: 81 TABLET, DELAYED RELEASE ORAL at 08:54

## 2019-04-25 RX ADMIN — ALPRAZOLAM 0.25 MG: 0.25 TABLET ORAL at 09:17

## 2019-04-25 RX ADMIN — HYDROCORTISONE SODIUM SUCCINATE 100 MG: 100 INJECTION, POWDER, FOR SOLUTION INTRAMUSCULAR; INTRAVENOUS at 09:17

## 2019-04-25 RX ADMIN — ALPRAZOLAM 0.25 MG: 0.25 TABLET ORAL at 18:56

## 2019-04-25 RX ADMIN — HYDROCODONE BITARTRATE AND ACETAMINOPHEN 2 TABLET: 5; 325 TABLET ORAL at 21:05

## 2019-04-25 RX ADMIN — MAGNESIUM SULFATE HEPTAHYDRATE 1 G: 1 INJECTION, SOLUTION INTRAVENOUS at 01:02

## 2019-04-25 RX ADMIN — ALBUMIN HUMAN 250 ML: 0.05 INJECTION, SOLUTION INTRAVENOUS at 22:15

## 2019-04-25 RX ADMIN — CHLORHEXIDINE GLUCONATE 15 ML: 1.2 RINSE ORAL at 16:38

## 2019-04-25 RX ADMIN — PHENYLEPHRINE HYDROCHLORIDE 0.2 MCG/KG/MIN: 10 INJECTION INTRAVENOUS at 04:42

## 2019-04-25 RX ADMIN — MUPIROCIN 1 APPLICATION: 20 OINTMENT TOPICAL at 21:02

## 2019-04-25 RX ADMIN — SODIUM CHLORIDE 3 G: 9 INJECTION, SOLUTION INTRAVENOUS at 06:16

## 2019-04-25 RX ADMIN — MUPIROCIN 1 APPLICATION: 20 OINTMENT TOPICAL at 08:54

## 2019-04-25 RX ADMIN — INSULIN GLARGINE 25 UNITS: 100 INJECTION, SOLUTION SUBCUTANEOUS at 10:55

## 2019-04-25 RX ADMIN — SODIUM CHLORIDE 3 G: 9 INJECTION, SOLUTION INTRAVENOUS at 15:43

## 2019-04-25 RX ADMIN — CALCIUM CHLORIDE: 100 INJECTION, SOLUTION INTRAVENOUS at 23:52

## 2019-04-25 RX ADMIN — SODIUM CHLORIDE 15 ML/HR: 9 INJECTION, SOLUTION INTRAVENOUS at 15:44

## 2019-04-25 RX ADMIN — SODIUM CHLORIDE 3 G: 9 INJECTION, SOLUTION INTRAVENOUS at 23:12

## 2019-04-25 RX ADMIN — ALBUMIN, HUMAN INJ 5% 250 ML: 5 SOLUTION at 22:15

## 2019-04-25 RX ADMIN — HYDROCODONE BITARTRATE AND ACETAMINOPHEN 2 TABLET: 5; 325 TABLET ORAL at 07:37

## 2019-04-25 RX ADMIN — ALBUMIN HUMAN 250 ML: 0.05 INJECTION, SOLUTION INTRAVENOUS at 08:42

## 2019-04-25 RX ADMIN — AMIODARONE HYDROCHLORIDE 0.5 MG/MIN: 1.8 INJECTION, SOLUTION INTRAVENOUS at 02:32

## 2019-04-25 RX ADMIN — CYCLOBENZAPRINE HYDROCHLORIDE 10 MG: 10 TABLET, FILM COATED ORAL at 19:45

## 2019-04-25 RX ADMIN — BUDESONIDE AND FORMOTEROL FUMARATE DIHYDRATE 2 PUFF: 160; 4.5 AEROSOL RESPIRATORY (INHALATION) at 21:00

## 2019-04-25 RX ADMIN — HYDROCODONE BITARTRATE AND ACETAMINOPHEN 2 TABLET: 5; 325 TABLET ORAL at 16:38

## 2019-04-25 RX ADMIN — CALCIUM CHLORIDE 1 G: 100 INJECTION INTRAVENOUS; INTRAVENTRICULAR at 07:53

## 2019-04-25 RX ADMIN — METOCLOPRAMIDE 10 MG: 5 INJECTION, SOLUTION INTRAMUSCULAR; INTRAVENOUS at 11:04

## 2019-04-25 NOTE — ANESTHESIA POSTPROCEDURE EVALUATION
"Patient: Lauri Garcia    Procedure Summary     Date:  04/24/19 Room / Location:  [facility] OR [address] [place] [address] MAIN OR    Anesthesia Start:  0930 Anesthesia Stop:  1618    Procedure:  INTRAOP GUILLERMINA; CORONARY ARTERY BYPASS X 4 WITH LEFT INTERNAL MAMMARY ARTERY GRAFT AND UTILIZING ENDOSCOPICALLY HARVESTED LEFT GREATER SAPHENOUS VEIN; PRP (N/A Chest) Diagnosis:       Coronary artery disease involving native coronary artery of native heart without angina pectoris      (Coronary artery disease involving native coronary artery of native heart without angina pectoris [I25.10])    Surgeon:  Sirisha Turner MD Provider:  Cruzito Mendes MD    Anesthesia Type:  general ASA Status:  4          Anesthesia Type: general  Last vitals  BP   113/65 (04/25/19 1003)   Temp   37 °C (98.6 °F) (04/24/19 1605)   Pulse   104 (04/25/19 1003)   Resp   24 (04/25/19 1003)     SpO2   96 % (04/25/19 1003)     Post Anesthesia Care and Evaluation    Patient location during evaluation: bedside  Patient participation: complete - patient participated  Level of consciousness: awake and alert  Pain management: adequate  Airway patency: patent  Anesthetic complications: No anesthetic complications  PONV Status: none  Cardiovascular status: acceptable  Respiratory status: acceptable  Hydration status: acceptable    Comments: /65 (BP Location: Right arm)   Pulse 104   Temp 37 °C (98.6 °F) (Core)   Resp 24   Ht 173 cm (68.11\")   Wt (!) 151 kg (332 lb)   SpO2 96%   BMI 50.32 kg/m²         "

## 2019-04-26 ENCOUNTER — APPOINTMENT (OUTPATIENT)
Dept: GENERAL RADIOLOGY | Facility: HOSPITAL | Age: 54
End: 2019-04-26

## 2019-04-26 ENCOUNTER — APPOINTMENT (OUTPATIENT)
Dept: CARDIOLOGY | Facility: HOSPITAL | Age: 54
End: 2019-04-26

## 2019-04-26 LAB
ANION GAP SERPL CALCULATED.3IONS-SCNC: 10.9 MMOL/L
BASE EXCESS BLDA CALC-SCNC: -2 MMOL/L (ref -5–5)
BASE EXCESS BLDA CALC-SCNC: 2 MMOL/L (ref -5–5)
BASE EXCESS BLDA CALC-SCNC: 2 MMOL/L (ref -5–5)
BASE EXCESS BLDA CALC-SCNC: 3 MMOL/L (ref -5–5)
BASE EXCESS BLDA CALC-SCNC: 4 MMOL/L (ref -5–5)
BASE EXCESS BLDA CALC-SCNC: 5 MMOL/L (ref -5–5)
BUN BLD-MCNC: 23 MG/DL (ref 6–20)
BUN/CREAT SERPL: 25.3 (ref 7–25)
CALCIUM SPEC-SCNC: 9.7 MG/DL (ref 8.6–10.5)
CHLORIDE SERPL-SCNC: 101 MMOL/L (ref 98–107)
CO2 BLDA-SCNC: 25 MMOL/L (ref 24–29)
CO2 BLDA-SCNC: 29 MMOL/L (ref 24–29)
CO2 BLDA-SCNC: 30 MMOL/L (ref 24–29)
CO2 BLDA-SCNC: 30 MMOL/L (ref 24–29)
CO2 BLDA-SCNC: 31 MMOL/L (ref 24–29)
CO2 BLDA-SCNC: 32 MMOL/L (ref 24–29)
CO2 SERPL-SCNC: 21.1 MMOL/L (ref 22–29)
CREAT BLD-MCNC: 0.91 MG/DL (ref 0.76–1.27)
DEPRECATED RDW RBC AUTO: 52.4 FL (ref 37–54)
ERYTHROCYTE [DISTWIDTH] IN BLOOD BY AUTOMATED COUNT: 15.6 % (ref 12.3–15.4)
GFR SERPL CREATININE-BSD FRML MDRD: 87 ML/MIN/1.73
GLUCOSE BLD-MCNC: 186 MG/DL (ref 65–99)
GLUCOSE BLDC GLUCOMTR-MCNC: 160 MG/DL (ref 70–130)
GLUCOSE BLDC GLUCOMTR-MCNC: 173 MG/DL (ref 70–130)
GLUCOSE BLDC GLUCOMTR-MCNC: 199 MG/DL (ref 70–130)
GLUCOSE BLDC GLUCOMTR-MCNC: 206 MG/DL (ref 70–130)
GLUCOSE BLDC GLUCOMTR-MCNC: 233 MG/DL (ref 70–130)
GLUCOSE BLDC GLUCOMTR-MCNC: 233 MG/DL (ref 70–130)
GLUCOSE BLDC GLUCOMTR-MCNC: 236 MG/DL (ref 70–130)
GLUCOSE BLDC GLUCOMTR-MCNC: 243 MG/DL (ref 70–130)
GLUCOSE BLDC GLUCOMTR-MCNC: 244 MG/DL (ref 70–130)
GLUCOSE BLDC GLUCOMTR-MCNC: 245 MG/DL (ref 70–130)
HCO3 BLDA-SCNC: 23.7 MMOL/L (ref 22–26)
HCO3 BLDA-SCNC: 27.7 MMOL/L (ref 22–26)
HCO3 BLDA-SCNC: 28.4 MMOL/L (ref 22–26)
HCO3 BLDA-SCNC: 28.6 MMOL/L (ref 22–26)
HCO3 BLDA-SCNC: 29.5 MMOL/L (ref 22–26)
HCO3 BLDA-SCNC: 30.1 MMOL/L (ref 22–26)
HCT VFR BLD AUTO: 29.4 % (ref 37.5–51)
HCT VFR BLDA CALC: 33 % (ref 38–51)
HCT VFR BLDA CALC: 39 % (ref 38–51)
HGB BLD-MCNC: 9.2 G/DL (ref 13–17.7)
HGB BLDA-MCNC: 11.2 G/DL (ref 12–17)
HGB BLDA-MCNC: 13.3 G/DL (ref 12–17)
MCH RBC QN AUTO: 28.7 PG (ref 26.6–33)
MCHC RBC AUTO-ENTMCNC: 31.3 G/DL (ref 31.5–35.7)
MCV RBC AUTO: 91.6 FL (ref 79–97)
PCO2 BLDA: 45.4 MM HG (ref 35–45)
PCO2 BLDA: 50 MM HG (ref 35–45)
PCO2 BLDA: 51.7 MM HG (ref 35–45)
PCO2 BLDA: 53.5 MM HG (ref 35–45)
PCO2 BLDA: 54.1 MM HG (ref 35–45)
PCO2 BLDA: 58.5 MM HG (ref 35–45)
PH BLDA: 7.3 PH UNITS (ref 7.35–7.6)
PH BLDA: 7.32 PH UNITS (ref 7.35–7.6)
PH BLDA: 7.33 PH UNITS (ref 7.35–7.6)
PH BLDA: 7.33 PH UNITS (ref 7.35–7.6)
PH BLDA: 7.37 PH UNITS (ref 7.35–7.6)
PH BLDA: 7.38 PH UNITS (ref 7.35–7.6)
PLATELET # BLD AUTO: 166 10*3/MM3 (ref 140–450)
PMV BLD AUTO: 9.9 FL (ref 6–12)
PO2 BLDA: 302 MMHG (ref 80–105)
PO2 BLDA: 324 MMHG (ref 80–105)
PO2 BLDA: 359 MMHG (ref 80–105)
PO2 BLDA: 37 MMHG (ref 80–105)
PO2 BLDA: 84 MMHG (ref 80–105)
PO2 BLDA: 97 MMHG (ref 80–105)
POTASSIUM BLD-SCNC: 4.5 MMOL/L (ref 3.5–5.2)
POTASSIUM BLDA-SCNC: 4.5 MMOL/L (ref 3.5–4.9)
POTASSIUM BLDA-SCNC: 4.7 MMOL/L (ref 3.5–4.9)
POTASSIUM BLDA-SCNC: 4.7 MMOL/L (ref 3.5–4.9)
POTASSIUM BLDA-SCNC: 5.1 MMOL/L (ref 3.5–4.9)
POTASSIUM BLDA-SCNC: 5.2 MMOL/L (ref 3.5–4.9)
POTASSIUM BLDA-SCNC: 5.5 MMOL/L (ref 3.5–4.9)
RBC # BLD AUTO: 3.21 10*6/MM3 (ref 4.14–5.8)
SAO2 % BLDA: 100 % (ref 95–98)
SAO2 % BLDA: 62 % (ref 95–98)
SAO2 % BLDA: 95 % (ref 95–98)
SAO2 % BLDA: 97 % (ref 95–98)
SODIUM BLD-SCNC: 133 MMOL/L (ref 136–145)
WBC NRBC COR # BLD: 10.48 10*3/MM3 (ref 3.4–10.8)

## 2019-04-26 PROCEDURE — 93005 ELECTROCARDIOGRAM TRACING: CPT | Performed by: INTERNAL MEDICINE

## 2019-04-26 PROCEDURE — 94799 UNLISTED PULMONARY SVC/PX: CPT

## 2019-04-26 PROCEDURE — 93321 DOPPLER ECHO F-UP/LMTD STD: CPT

## 2019-04-26 PROCEDURE — 93308 TTE F-UP OR LMTD: CPT | Performed by: INTERNAL MEDICINE

## 2019-04-26 PROCEDURE — 71045 X-RAY EXAM CHEST 1 VIEW: CPT

## 2019-04-26 PROCEDURE — 80048 BASIC METABOLIC PNL TOTAL CA: CPT | Performed by: THORACIC SURGERY (CARDIOTHORACIC VASCULAR SURGERY)

## 2019-04-26 PROCEDURE — 25010000002 AMIODARONE IN DEXTROSE 5% 360-4.14 MG/200ML-% SOLUTION: Performed by: NURSE PRACTITIONER

## 2019-04-26 PROCEDURE — 25010000002 MAGNESIUM SULFATE IN D5W 1G/100ML (PREMIX) 1-5 GM/100ML-% SOLUTION: Performed by: NURSE PRACTITIONER

## 2019-04-26 PROCEDURE — 93325 DOPPLER ECHO COLOR FLOW MAPG: CPT

## 2019-04-26 PROCEDURE — 25010000002 ENOXAPARIN PER 10 MG: Performed by: NURSE PRACTITIONER

## 2019-04-26 PROCEDURE — 93325 DOPPLER ECHO COLOR FLOW MAPG: CPT | Performed by: INTERNAL MEDICINE

## 2019-04-26 PROCEDURE — 93308 TTE F-UP OR LMTD: CPT

## 2019-04-26 PROCEDURE — 93321 DOPPLER ECHO F-UP/LMTD STD: CPT | Performed by: INTERNAL MEDICINE

## 2019-04-26 PROCEDURE — 93005 ELECTROCARDIOGRAM TRACING: CPT | Performed by: THORACIC SURGERY (CARDIOTHORACIC VASCULAR SURGERY)

## 2019-04-26 PROCEDURE — 25010000002 CALCIUM GLUCONATE PER 10 ML: Performed by: NURSE PRACTITIONER

## 2019-04-26 PROCEDURE — 93325 DOPPLER ECHO COLOR FLOW MAPG: CPT | Performed by: THORACIC SURGERY (CARDIOTHORACIC VASCULAR SURGERY)

## 2019-04-26 PROCEDURE — 99233 SBSQ HOSP IP/OBS HIGH 50: CPT | Performed by: INTERNAL MEDICINE

## 2019-04-26 PROCEDURE — 93308 TTE F-UP OR LMTD: CPT | Performed by: THORACIC SURGERY (CARDIOTHORACIC VASCULAR SURGERY)

## 2019-04-26 PROCEDURE — 25010000002 CEFAZOLIN PER 500 MG: Performed by: THORACIC SURGERY (CARDIOTHORACIC VASCULAR SURGERY)

## 2019-04-26 PROCEDURE — 99232 SBSQ HOSP IP/OBS MODERATE 35: CPT | Performed by: NURSE PRACTITIONER

## 2019-04-26 PROCEDURE — 63710000001 INSULIN LISPRO (HUMAN) PER 5 UNITS: Performed by: INTERNAL MEDICINE

## 2019-04-26 PROCEDURE — 82962 GLUCOSE BLOOD TEST: CPT

## 2019-04-26 PROCEDURE — 85027 COMPLETE CBC AUTOMATED: CPT | Performed by: THORACIC SURGERY (CARDIOTHORACIC VASCULAR SURGERY)

## 2019-04-26 PROCEDURE — 25010000002 AMIODARONE IN DEXTROSE 5% 150-4.21 MG/100ML-% SOLUTION: Performed by: NURSE PRACTITIONER

## 2019-04-26 PROCEDURE — 93321 DOPPLER ECHO F-UP/LMTD STD: CPT | Performed by: THORACIC SURGERY (CARDIOTHORACIC VASCULAR SURGERY)

## 2019-04-26 PROCEDURE — 25010000002 DIGOXIN PER 500 MCG: Performed by: THORACIC SURGERY (CARDIOTHORACIC VASCULAR SURGERY)

## 2019-04-26 PROCEDURE — 99024 POSTOP FOLLOW-UP VISIT: CPT | Performed by: NURSE PRACTITIONER

## 2019-04-26 PROCEDURE — 93010 ELECTROCARDIOGRAM REPORT: CPT | Performed by: INTERNAL MEDICINE

## 2019-04-26 PROCEDURE — 63710000001 INSULIN GLARGINE PER 5 UNITS: Performed by: INTERNAL MEDICINE

## 2019-04-26 RX ORDER — MAGNESIUM SULFATE 1 G/100ML
1 INJECTION INTRAVENOUS ONCE
Status: COMPLETED | OUTPATIENT
Start: 2019-04-26 | End: 2019-04-26

## 2019-04-26 RX ORDER — ATORVASTATIN CALCIUM 20 MG/1
20 TABLET, FILM COATED ORAL NIGHTLY
Status: DISCONTINUED | OUTPATIENT
Start: 2019-04-26 | End: 2019-05-01 | Stop reason: HOSPADM

## 2019-04-26 RX ORDER — GUAIFENESIN 600 MG/1
1200 TABLET, EXTENDED RELEASE ORAL EVERY 12 HOURS SCHEDULED
Status: DISCONTINUED | OUTPATIENT
Start: 2019-04-26 | End: 2019-05-01 | Stop reason: HOSPADM

## 2019-04-26 RX ORDER — INSULIN GLARGINE 100 [IU]/ML
30 INJECTION, SOLUTION SUBCUTANEOUS EVERY MORNING
Status: DISCONTINUED | OUTPATIENT
Start: 2019-04-27 | End: 2019-05-01 | Stop reason: HOSPADM

## 2019-04-26 RX ORDER — ASPIRIN 325 MG
325 TABLET, DELAYED RELEASE (ENTERIC COATED) ORAL DAILY
Status: DISCONTINUED | OUTPATIENT
Start: 2019-04-27 | End: 2019-05-01 | Stop reason: HOSPADM

## 2019-04-26 RX ORDER — DIGOXIN 0.25 MG/ML
500 INJECTION INTRAMUSCULAR; INTRAVENOUS ONCE
Status: COMPLETED | OUTPATIENT
Start: 2019-04-26 | End: 2019-04-26

## 2019-04-26 RX ADMIN — SODIUM CHLORIDE 3 G: 9 INJECTION, SOLUTION INTRAVENOUS at 08:34

## 2019-04-26 RX ADMIN — CHLORHEXIDINE GLUCONATE 15 ML: 1.2 RINSE ORAL at 17:14

## 2019-04-26 RX ADMIN — AMIODARONE HYDROCHLORIDE 150 MG: 1.5 INJECTION, SOLUTION INTRAVENOUS at 16:20

## 2019-04-26 RX ADMIN — ENOXAPARIN SODIUM 40 MG: 40 INJECTION SUBCUTANEOUS at 17:14

## 2019-04-26 RX ADMIN — ALPRAZOLAM 0.25 MG: 0.25 TABLET ORAL at 10:31

## 2019-04-26 RX ADMIN — CALCIUM GLUCONATE 1 G: 98 INJECTION, SOLUTION INTRAVENOUS at 11:19

## 2019-04-26 RX ADMIN — SENNOSIDES AND DOCUSATE SODIUM 2 TABLET: 8.6; 5 TABLET ORAL at 22:13

## 2019-04-26 RX ADMIN — ASPIRIN 81 MG: 81 TABLET, DELAYED RELEASE ORAL at 10:31

## 2019-04-26 RX ADMIN — BUDESONIDE AND FORMOTEROL FUMARATE DIHYDRATE 2 PUFF: 160; 4.5 AEROSOL RESPIRATORY (INHALATION) at 19:44

## 2019-04-26 RX ADMIN — ATORVASTATIN CALCIUM 20 MG: 20 TABLET, FILM COATED ORAL at 22:14

## 2019-04-26 RX ADMIN — AMIODARONE HYDROCHLORIDE 400 MG: 200 TABLET ORAL at 08:34

## 2019-04-26 RX ADMIN — AMIODARONE HYDROCHLORIDE 0.5 MG/MIN: 1.8 INJECTION, SOLUTION INTRAVENOUS at 23:19

## 2019-04-26 RX ADMIN — MONTELUKAST SODIUM 10 MG: 10 TABLET, FILM COATED ORAL at 22:17

## 2019-04-26 RX ADMIN — GUAIFENESIN 1200 MG: 600 TABLET, EXTENDED RELEASE ORAL at 17:14

## 2019-04-26 RX ADMIN — INSULIN LISPRO 6 UNITS: 100 INJECTION, SOLUTION INTRAVENOUS; SUBCUTANEOUS at 07:12

## 2019-04-26 RX ADMIN — CHLORHEXIDINE GLUCONATE 15 ML: 1.2 RINSE ORAL at 06:51

## 2019-04-26 RX ADMIN — MAGNESIUM SULFATE HEPTAHYDRATE 1 G: 1 INJECTION, SOLUTION INTRAVENOUS at 16:20

## 2019-04-26 RX ADMIN — INSULIN GLARGINE 25 UNITS: 100 INJECTION, SOLUTION SUBCUTANEOUS at 06:52

## 2019-04-26 RX ADMIN — MUPIROCIN 1 APPLICATION: 20 OINTMENT TOPICAL at 22:16

## 2019-04-26 RX ADMIN — CYCLOBENZAPRINE HYDROCHLORIDE 10 MG: 10 TABLET, FILM COATED ORAL at 10:31

## 2019-04-26 RX ADMIN — BUDESONIDE AND FORMOTEROL FUMARATE DIHYDRATE 2 PUFF: 160; 4.5 AEROSOL RESPIRATORY (INHALATION) at 08:18

## 2019-04-26 RX ADMIN — HYDROCODONE BITARTRATE AND ACETAMINOPHEN 2 TABLET: 5; 325 TABLET ORAL at 11:30

## 2019-04-26 RX ADMIN — ALPRAZOLAM 0.25 MG: 0.25 TABLET ORAL at 22:13

## 2019-04-26 RX ADMIN — DIGOXIN 500 MCG: 0.25 INJECTION INTRAMUSCULAR; INTRAVENOUS at 20:27

## 2019-04-26 RX ADMIN — HYDROCODONE BITARTRATE AND ACETAMINOPHEN 2 TABLET: 5; 325 TABLET ORAL at 02:28

## 2019-04-26 RX ADMIN — GUAIFENESIN 1200 MG: 600 TABLET, EXTENDED RELEASE ORAL at 22:13

## 2019-04-26 RX ADMIN — INSULIN LISPRO 6 UNITS: 100 INJECTION, SOLUTION INTRAVENOUS; SUBCUTANEOUS at 11:30

## 2019-04-26 RX ADMIN — AMIODARONE HYDROCHLORIDE 1 MG/MIN: 1.8 INJECTION, SOLUTION INTRAVENOUS at 17:14

## 2019-04-26 RX ADMIN — MUPIROCIN 1 APPLICATION: 20 OINTMENT TOPICAL at 10:32

## 2019-04-26 RX ADMIN — PANTOPRAZOLE SODIUM 40 MG: 40 TABLET, DELAYED RELEASE ORAL at 06:51

## 2019-04-26 RX ADMIN — INSULIN LISPRO 8 UNITS: 100 INJECTION, SOLUTION INTRAVENOUS; SUBCUTANEOUS at 17:15

## 2019-04-26 RX ADMIN — AMIODARONE HYDROCHLORIDE 1 MG/MIN: 1.8 INJECTION, SOLUTION INTRAVENOUS at 16:56

## 2019-04-26 RX ADMIN — INSULIN LISPRO 3 UNITS: 100 INJECTION, SOLUTION INTRAVENOUS; SUBCUTANEOUS at 22:14

## 2019-04-26 RX ADMIN — INSULIN LISPRO 2 UNITS: 100 INJECTION, SOLUTION INTRAVENOUS; SUBCUTANEOUS at 17:15

## 2019-04-26 RX ADMIN — INSULIN LISPRO 4 UNITS: 100 INJECTION, SOLUTION INTRAVENOUS; SUBCUTANEOUS at 11:31

## 2019-04-26 RX ADMIN — INSULIN LISPRO 2 UNITS: 100 INJECTION, SOLUTION INTRAVENOUS; SUBCUTANEOUS at 07:13

## 2019-04-26 RX ADMIN — ENOXAPARIN SODIUM 40 MG: 40 INJECTION SUBCUTANEOUS at 22:17

## 2019-04-26 RX ADMIN — HYDROCODONE BITARTRATE AND ACETAMINOPHEN 2 TABLET: 5; 325 TABLET ORAL at 20:27

## 2019-04-27 ENCOUNTER — APPOINTMENT (OUTPATIENT)
Dept: GENERAL RADIOLOGY | Facility: HOSPITAL | Age: 54
End: 2019-04-27

## 2019-04-27 LAB
ABO + RH BLD: NORMAL
ABO + RH BLD: NORMAL
ANION GAP SERPL CALCULATED.3IONS-SCNC: 11 MMOL/L
BH BB BLOOD EXPIRATION DATE: NORMAL
BH BB BLOOD EXPIRATION DATE: NORMAL
BH BB BLOOD TYPE BARCODE: 5100
BH BB BLOOD TYPE BARCODE: 5100
BH BB DISPENSE STATUS: NORMAL
BH BB DISPENSE STATUS: NORMAL
BH BB PRODUCT CODE: NORMAL
BH BB PRODUCT CODE: NORMAL
BH BB UNIT NUMBER: NORMAL
BH BB UNIT NUMBER: NORMAL
BUN BLD-MCNC: 19 MG/DL (ref 6–20)
BUN/CREAT SERPL: 25 (ref 7–25)
CALCIUM SPEC-SCNC: 8.8 MG/DL (ref 8.6–10.5)
CHLORIDE SERPL-SCNC: 101 MMOL/L (ref 98–107)
CO2 SERPL-SCNC: 26 MMOL/L (ref 22–29)
CREAT BLD-MCNC: 0.76 MG/DL (ref 0.76–1.27)
DEPRECATED RDW RBC AUTO: 53.1 FL (ref 37–54)
ERYTHROCYTE [DISTWIDTH] IN BLOOD BY AUTOMATED COUNT: 15.8 % (ref 12.3–15.4)
GFR SERPL CREATININE-BSD FRML MDRD: 108 ML/MIN/1.73
GLUCOSE BLD-MCNC: 165 MG/DL (ref 65–99)
GLUCOSE BLDC GLUCOMTR-MCNC: 132 MG/DL (ref 70–130)
GLUCOSE BLDC GLUCOMTR-MCNC: 156 MG/DL (ref 70–130)
GLUCOSE BLDC GLUCOMTR-MCNC: 197 MG/DL (ref 70–130)
HCT VFR BLD AUTO: 28.1 % (ref 37.5–51)
HGB BLD-MCNC: 8.7 G/DL (ref 13–17.7)
MCH RBC QN AUTO: 28.7 PG (ref 26.6–33)
MCHC RBC AUTO-ENTMCNC: 31 G/DL (ref 31.5–35.7)
MCV RBC AUTO: 92.7 FL (ref 79–97)
PLATELET # BLD AUTO: 161 10*3/MM3 (ref 140–450)
PMV BLD AUTO: 9.1 FL (ref 6–12)
POTASSIUM BLD-SCNC: 3.6 MMOL/L (ref 3.5–5.2)
POTASSIUM BLD-SCNC: 4.1 MMOL/L (ref 3.5–5.2)
RBC # BLD AUTO: 3.03 10*6/MM3 (ref 4.14–5.8)
SODIUM BLD-SCNC: 138 MMOL/L (ref 136–145)
UNIT  ABO: NORMAL
UNIT  ABO: NORMAL
UNIT  RH: NORMAL
UNIT  RH: NORMAL
WBC NRBC COR # BLD: 9.87 10*3/MM3 (ref 3.4–10.8)

## 2019-04-27 PROCEDURE — 94799 UNLISTED PULMONARY SVC/PX: CPT

## 2019-04-27 PROCEDURE — 25010000002 FUROSEMIDE PER 20 MG: Performed by: NURSE PRACTITIONER

## 2019-04-27 PROCEDURE — 99233 SBSQ HOSP IP/OBS HIGH 50: CPT | Performed by: INTERNAL MEDICINE

## 2019-04-27 PROCEDURE — 25010000002 ENOXAPARIN PER 10 MG: Performed by: NURSE PRACTITIONER

## 2019-04-27 PROCEDURE — 99232 SBSQ HOSP IP/OBS MODERATE 35: CPT | Performed by: INTERNAL MEDICINE

## 2019-04-27 PROCEDURE — 99024 POSTOP FOLLOW-UP VISIT: CPT | Performed by: NURSE PRACTITIONER

## 2019-04-27 PROCEDURE — 80048 BASIC METABOLIC PNL TOTAL CA: CPT | Performed by: THORACIC SURGERY (CARDIOTHORACIC VASCULAR SURGERY)

## 2019-04-27 PROCEDURE — 71046 X-RAY EXAM CHEST 2 VIEWS: CPT

## 2019-04-27 PROCEDURE — 63710000001 INSULIN LISPRO (HUMAN) PER 5 UNITS: Performed by: INTERNAL MEDICINE

## 2019-04-27 PROCEDURE — 97110 THERAPEUTIC EXERCISES: CPT

## 2019-04-27 PROCEDURE — 97162 PT EVAL MOD COMPLEX 30 MIN: CPT

## 2019-04-27 PROCEDURE — 25010000002 AMIODARONE IN DEXTROSE 5% 360-4.14 MG/200ML-% SOLUTION: Performed by: NURSE PRACTITIONER

## 2019-04-27 PROCEDURE — 63710000001 INSULIN GLARGINE PER 5 UNITS: Performed by: INTERNAL MEDICINE

## 2019-04-27 PROCEDURE — 82962 GLUCOSE BLOOD TEST: CPT

## 2019-04-27 PROCEDURE — 84132 ASSAY OF SERUM POTASSIUM: CPT | Performed by: NURSE PRACTITIONER

## 2019-04-27 PROCEDURE — 85027 COMPLETE CBC AUTOMATED: CPT | Performed by: THORACIC SURGERY (CARDIOTHORACIC VASCULAR SURGERY)

## 2019-04-27 RX ORDER — FUROSEMIDE 10 MG/ML
40 INJECTION INTRAMUSCULAR; INTRAVENOUS ONCE
Status: COMPLETED | OUTPATIENT
Start: 2019-04-27 | End: 2019-04-27

## 2019-04-27 RX ORDER — AMIODARONE HYDROCHLORIDE 200 MG/1
400 TABLET ORAL EVERY 12 HOURS SCHEDULED
Status: DISCONTINUED | OUTPATIENT
Start: 2019-04-27 | End: 2019-04-30

## 2019-04-27 RX ORDER — POTASSIUM CHLORIDE 750 MG/1
20 CAPSULE, EXTENDED RELEASE ORAL DAILY
Status: DISCONTINUED | OUTPATIENT
Start: 2019-04-27 | End: 2019-05-01 | Stop reason: HOSPADM

## 2019-04-27 RX ADMIN — PANTOPRAZOLE SODIUM 40 MG: 40 TABLET, DELAYED RELEASE ORAL at 06:44

## 2019-04-27 RX ADMIN — MONTELUKAST SODIUM 10 MG: 10 TABLET, FILM COATED ORAL at 21:19

## 2019-04-27 RX ADMIN — POTASSIUM CHLORIDE 20 MEQ: 750 CAPSULE, EXTENDED RELEASE ORAL at 10:58

## 2019-04-27 RX ADMIN — METFORMIN HYDROCHLORIDE 500 MG: 500 TABLET ORAL at 11:32

## 2019-04-27 RX ADMIN — INSULIN LISPRO 10 UNITS: 100 INJECTION, SOLUTION INTRAVENOUS; SUBCUTANEOUS at 18:38

## 2019-04-27 RX ADMIN — MUPIROCIN 1 APPLICATION: 20 OINTMENT TOPICAL at 09:29

## 2019-04-27 RX ADMIN — ENOXAPARIN SODIUM 40 MG: 40 INJECTION SUBCUTANEOUS at 21:18

## 2019-04-27 RX ADMIN — ATORVASTATIN CALCIUM 20 MG: 20 TABLET, FILM COATED ORAL at 21:19

## 2019-04-27 RX ADMIN — BUDESONIDE AND FORMOTEROL FUMARATE DIHYDRATE 2 PUFF: 160; 4.5 AEROSOL RESPIRATORY (INHALATION) at 19:50

## 2019-04-27 RX ADMIN — LINAGLIPTIN 5 MG: 5 TABLET, FILM COATED ORAL at 11:32

## 2019-04-27 RX ADMIN — FUROSEMIDE 40 MG: 10 INJECTION, SOLUTION INTRAMUSCULAR; INTRAVENOUS at 10:56

## 2019-04-27 RX ADMIN — INSULIN LISPRO 10 UNITS: 100 INJECTION, SOLUTION INTRAVENOUS; SUBCUTANEOUS at 09:29

## 2019-04-27 RX ADMIN — AMIODARONE HYDROCHLORIDE 400 MG: 200 TABLET ORAL at 10:58

## 2019-04-27 RX ADMIN — BUDESONIDE AND FORMOTEROL FUMARATE DIHYDRATE 2 PUFF: 160; 4.5 AEROSOL RESPIRATORY (INHALATION) at 11:18

## 2019-04-27 RX ADMIN — AMIODARONE HYDROCHLORIDE 400 MG: 200 TABLET ORAL at 21:19

## 2019-04-27 RX ADMIN — INSULIN LISPRO 2 UNITS: 100 INJECTION, SOLUTION INTRAVENOUS; SUBCUTANEOUS at 21:19

## 2019-04-27 RX ADMIN — AMIODARONE HYDROCHLORIDE 0.5 MG/MIN: 1.8 INJECTION, SOLUTION INTRAVENOUS at 09:44

## 2019-04-27 RX ADMIN — GUAIFENESIN 1200 MG: 600 TABLET, EXTENDED RELEASE ORAL at 21:19

## 2019-04-27 RX ADMIN — GUAIFENESIN 1200 MG: 600 TABLET, EXTENDED RELEASE ORAL at 09:28

## 2019-04-27 RX ADMIN — ASPIRIN 325 MG: 325 TABLET, COATED ORAL at 09:28

## 2019-04-27 RX ADMIN — POTASSIUM CHLORIDE 40 MEQ: 750 CAPSULE, EXTENDED RELEASE ORAL at 18:38

## 2019-04-27 RX ADMIN — INSULIN GLARGINE 30 UNITS: 100 INJECTION, SOLUTION SUBCUTANEOUS at 06:44

## 2019-04-27 RX ADMIN — ENOXAPARIN SODIUM 40 MG: 40 INJECTION SUBCUTANEOUS at 09:29

## 2019-04-27 RX ADMIN — INSULIN LISPRO 10 UNITS: 100 INJECTION, SOLUTION INTRAVENOUS; SUBCUTANEOUS at 12:13

## 2019-04-27 RX ADMIN — HYDROCODONE BITARTRATE AND ACETAMINOPHEN 2 TABLET: 5; 325 TABLET ORAL at 16:28

## 2019-04-27 RX ADMIN — HYDROCODONE BITARTRATE AND ACETAMINOPHEN 2 TABLET: 5; 325 TABLET ORAL at 21:33

## 2019-04-27 RX ADMIN — MUPIROCIN 1 APPLICATION: 20 OINTMENT TOPICAL at 21:19

## 2019-04-27 RX ADMIN — METFORMIN HYDROCHLORIDE 500 MG: 500 TABLET ORAL at 18:38

## 2019-04-27 RX ADMIN — ALPRAZOLAM 0.25 MG: 0.25 TABLET ORAL at 22:11

## 2019-04-27 RX ADMIN — HYDROCODONE BITARTRATE AND ACETAMINOPHEN 2 TABLET: 5; 325 TABLET ORAL at 09:28

## 2019-04-28 LAB
ALBUMIN SERPL-MCNC: 3.2 G/DL (ref 3.5–5.2)
ALBUMIN/GLOB SERPL: 1.2 G/DL
ALP SERPL-CCNC: 56 U/L (ref 39–117)
ALT SERPL W P-5'-P-CCNC: 17 U/L (ref 1–41)
ANION GAP SERPL CALCULATED.3IONS-SCNC: 11.8 MMOL/L
AST SERPL-CCNC: 18 U/L (ref 1–40)
BILIRUB SERPL-MCNC: 0.4 MG/DL (ref 0.2–1.2)
BUN BLD-MCNC: 18 MG/DL (ref 6–20)
BUN/CREAT SERPL: 22.5 (ref 7–25)
CALCIUM SPEC-SCNC: 8.6 MG/DL (ref 8.6–10.5)
CHLORIDE SERPL-SCNC: 103 MMOL/L (ref 98–107)
CO2 SERPL-SCNC: 25.2 MMOL/L (ref 22–29)
CREAT BLD-MCNC: 0.8 MG/DL (ref 0.76–1.27)
DEPRECATED RDW RBC AUTO: 52.6 FL (ref 37–54)
ERYTHROCYTE [DISTWIDTH] IN BLOOD BY AUTOMATED COUNT: 15.6 % (ref 12.3–15.4)
GFR SERPL CREATININE-BSD FRML MDRD: 102 ML/MIN/1.73
GLOBULIN UR ELPH-MCNC: 2.7 GM/DL
GLUCOSE BLD-MCNC: 106 MG/DL (ref 65–99)
GLUCOSE BLDC GLUCOMTR-MCNC: 120 MG/DL (ref 70–130)
GLUCOSE BLDC GLUCOMTR-MCNC: 146 MG/DL (ref 70–130)
GLUCOSE BLDC GLUCOMTR-MCNC: 195 MG/DL (ref 70–130)
GLUCOSE BLDC GLUCOMTR-MCNC: 93 MG/DL (ref 70–130)
HCT VFR BLD AUTO: 29.3 % (ref 37.5–51)
HGB BLD-MCNC: 9.1 G/DL (ref 13–17.7)
MCH RBC QN AUTO: 28.6 PG (ref 26.6–33)
MCHC RBC AUTO-ENTMCNC: 31.1 G/DL (ref 31.5–35.7)
MCV RBC AUTO: 92.1 FL (ref 79–97)
PLATELET # BLD AUTO: 202 10*3/MM3 (ref 140–450)
PMV BLD AUTO: 9.4 FL (ref 6–12)
POTASSIUM BLD-SCNC: 3.7 MMOL/L (ref 3.5–5.2)
PROT SERPL-MCNC: 5.9 G/DL (ref 6–8.5)
RBC # BLD AUTO: 3.18 10*6/MM3 (ref 4.14–5.8)
SODIUM BLD-SCNC: 140 MMOL/L (ref 136–145)
WBC NRBC COR # BLD: 9.67 10*3/MM3 (ref 3.4–10.8)

## 2019-04-28 PROCEDURE — 99233 SBSQ HOSP IP/OBS HIGH 50: CPT | Performed by: INTERNAL MEDICINE

## 2019-04-28 PROCEDURE — 97110 THERAPEUTIC EXERCISES: CPT

## 2019-04-28 PROCEDURE — 25010000002 ENOXAPARIN PER 10 MG: Performed by: NURSE PRACTITIONER

## 2019-04-28 PROCEDURE — 99024 POSTOP FOLLOW-UP VISIT: CPT | Performed by: NURSE PRACTITIONER

## 2019-04-28 PROCEDURE — 94799 UNLISTED PULMONARY SVC/PX: CPT

## 2019-04-28 PROCEDURE — 82962 GLUCOSE BLOOD TEST: CPT

## 2019-04-28 PROCEDURE — 80053 COMPREHEN METABOLIC PANEL: CPT | Performed by: INTERNAL MEDICINE

## 2019-04-28 PROCEDURE — 63710000001 INSULIN GLARGINE PER 5 UNITS: Performed by: INTERNAL MEDICINE

## 2019-04-28 PROCEDURE — 85027 COMPLETE CBC AUTOMATED: CPT | Performed by: THORACIC SURGERY (CARDIOTHORACIC VASCULAR SURGERY)

## 2019-04-28 PROCEDURE — 99231 SBSQ HOSP IP/OBS SF/LOW 25: CPT | Performed by: NURSE PRACTITIONER

## 2019-04-28 PROCEDURE — 63710000001 INSULIN LISPRO (HUMAN) PER 5 UNITS: Performed by: INTERNAL MEDICINE

## 2019-04-28 RX ADMIN — ENOXAPARIN SODIUM 40 MG: 40 INJECTION SUBCUTANEOUS at 08:34

## 2019-04-28 RX ADMIN — GUAIFENESIN 1200 MG: 600 TABLET, EXTENDED RELEASE ORAL at 20:26

## 2019-04-28 RX ADMIN — MUPIROCIN 1 APPLICATION: 20 OINTMENT TOPICAL at 08:33

## 2019-04-28 RX ADMIN — INSULIN LISPRO 10 UNITS: 100 INJECTION, SOLUTION INTRAVENOUS; SUBCUTANEOUS at 12:21

## 2019-04-28 RX ADMIN — POTASSIUM CHLORIDE 20 MEQ: 750 CAPSULE, EXTENDED RELEASE ORAL at 08:33

## 2019-04-28 RX ADMIN — GUAIFENESIN 1200 MG: 600 TABLET, EXTENDED RELEASE ORAL at 08:33

## 2019-04-28 RX ADMIN — BUDESONIDE AND FORMOTEROL FUMARATE DIHYDRATE 2 PUFF: 160; 4.5 AEROSOL RESPIRATORY (INHALATION) at 19:48

## 2019-04-28 RX ADMIN — SENNOSIDES AND DOCUSATE SODIUM 2 TABLET: 8.6; 5 TABLET ORAL at 20:27

## 2019-04-28 RX ADMIN — MONTELUKAST SODIUM 10 MG: 10 TABLET, FILM COATED ORAL at 20:27

## 2019-04-28 RX ADMIN — AMIODARONE HYDROCHLORIDE 400 MG: 200 TABLET ORAL at 08:33

## 2019-04-28 RX ADMIN — INSULIN LISPRO 10 UNITS: 100 INJECTION, SOLUTION INTRAVENOUS; SUBCUTANEOUS at 06:51

## 2019-04-28 RX ADMIN — METOPROLOL TARTRATE 12.5 MG: 25 TABLET ORAL at 12:20

## 2019-04-28 RX ADMIN — MUPIROCIN: 20 OINTMENT TOPICAL at 20:28

## 2019-04-28 RX ADMIN — METFORMIN HYDROCHLORIDE 500 MG: 1000 TABLET ORAL at 17:53

## 2019-04-28 RX ADMIN — ENOXAPARIN SODIUM 40 MG: 40 INJECTION SUBCUTANEOUS at 20:26

## 2019-04-28 RX ADMIN — BUDESONIDE AND FORMOTEROL FUMARATE DIHYDRATE 2 PUFF: 160; 4.5 AEROSOL RESPIRATORY (INHALATION) at 07:55

## 2019-04-28 RX ADMIN — AMIODARONE HYDROCHLORIDE 400 MG: 200 TABLET ORAL at 20:26

## 2019-04-28 RX ADMIN — METFORMIN HYDROCHLORIDE 500 MG: 500 TABLET ORAL at 08:33

## 2019-04-28 RX ADMIN — ATORVASTATIN CALCIUM 20 MG: 20 TABLET, FILM COATED ORAL at 20:27

## 2019-04-28 RX ADMIN — INSULIN LISPRO 2 UNITS: 100 INJECTION, SOLUTION INTRAVENOUS; SUBCUTANEOUS at 12:20

## 2019-04-28 RX ADMIN — ASPIRIN 325 MG: 325 TABLET, COATED ORAL at 08:33

## 2019-04-28 RX ADMIN — PANTOPRAZOLE SODIUM 40 MG: 40 TABLET, DELAYED RELEASE ORAL at 06:51

## 2019-04-28 RX ADMIN — LINAGLIPTIN 5 MG: 5 TABLET, FILM COATED ORAL at 08:33

## 2019-04-28 RX ADMIN — INSULIN GLARGINE 30 UNITS: 100 INJECTION, SOLUTION SUBCUTANEOUS at 06:51

## 2019-04-28 RX ADMIN — METOPROLOL TARTRATE 12.5 MG: 25 TABLET ORAL at 20:27

## 2019-04-28 RX ADMIN — HYDROCODONE BITARTRATE AND ACETAMINOPHEN 2 TABLET: 5; 325 TABLET ORAL at 08:33

## 2019-04-29 ENCOUNTER — APPOINTMENT (OUTPATIENT)
Dept: GENERAL RADIOLOGY | Facility: HOSPITAL | Age: 54
End: 2019-04-29

## 2019-04-29 LAB
ANION GAP SERPL CALCULATED.3IONS-SCNC: 12 MMOL/L
BH CV ECHO MEAS - BSA(HAYCOCK): 2.8 M^2
BH CV ECHO MEAS - BSA: 2.6 M^2
BH CV ECHO MEAS - BZI_BMI: 52 KILOGRAMS/M^2
BH CV ECHO MEAS - BZI_METRIC_HEIGHT: 172.7 CM
BH CV ECHO MEAS - BZI_METRIC_WEIGHT: 155.1 KG
BH CV VAS BP RIGHT ARM: NORMAL MMHG
BUN BLD-MCNC: 14 MG/DL (ref 6–20)
BUN/CREAT SERPL: 18.4 (ref 7–25)
CALCIUM SPEC-SCNC: 8.8 MG/DL (ref 8.6–10.5)
CHLORIDE SERPL-SCNC: 102 MMOL/L (ref 98–107)
CO2 SERPL-SCNC: 23 MMOL/L (ref 22–29)
CREAT BLD-MCNC: 0.76 MG/DL (ref 0.76–1.27)
GFR SERPL CREATININE-BSD FRML MDRD: 108 ML/MIN/1.73
GLUCOSE BLD-MCNC: 118 MG/DL (ref 65–99)
GLUCOSE BLDC GLUCOMTR-MCNC: 116 MG/DL (ref 70–130)
GLUCOSE BLDC GLUCOMTR-MCNC: 123 MG/DL (ref 70–130)
GLUCOSE BLDC GLUCOMTR-MCNC: 159 MG/DL (ref 70–130)
GLUCOSE BLDC GLUCOMTR-MCNC: 197 MG/DL (ref 70–130)
LV EF 2D ECHO EST: 40 %
MAXIMAL PREDICTED HEART RATE: 168 BPM
POTASSIUM BLD-SCNC: 4 MMOL/L (ref 3.5–5.2)
SODIUM BLD-SCNC: 137 MMOL/L (ref 136–145)
STRESS TARGET HR: 143 BPM

## 2019-04-29 PROCEDURE — 25010000002 ENOXAPARIN PER 10 MG: Performed by: NURSE PRACTITIONER

## 2019-04-29 PROCEDURE — 99232 SBSQ HOSP IP/OBS MODERATE 35: CPT | Performed by: INTERNAL MEDICINE

## 2019-04-29 PROCEDURE — 71045 X-RAY EXAM CHEST 1 VIEW: CPT

## 2019-04-29 PROCEDURE — 97110 THERAPEUTIC EXERCISES: CPT

## 2019-04-29 PROCEDURE — 82962 GLUCOSE BLOOD TEST: CPT

## 2019-04-29 PROCEDURE — 25010000002 FUROSEMIDE PER 20 MG: Performed by: INTERNAL MEDICINE

## 2019-04-29 PROCEDURE — 80048 BASIC METABOLIC PNL TOTAL CA: CPT | Performed by: THORACIC SURGERY (CARDIOTHORACIC VASCULAR SURGERY)

## 2019-04-29 PROCEDURE — 63710000001 INSULIN GLARGINE PER 5 UNITS: Performed by: INTERNAL MEDICINE

## 2019-04-29 PROCEDURE — 99024 POSTOP FOLLOW-UP VISIT: CPT | Performed by: NURSE PRACTITIONER

## 2019-04-29 PROCEDURE — 63710000001 INSULIN LISPRO (HUMAN) PER 5 UNITS: Performed by: INTERNAL MEDICINE

## 2019-04-29 PROCEDURE — 94799 UNLISTED PULMONARY SVC/PX: CPT

## 2019-04-29 RX ORDER — FUROSEMIDE 10 MG/ML
20 INJECTION INTRAMUSCULAR; INTRAVENOUS ONCE
Status: COMPLETED | OUTPATIENT
Start: 2019-04-29 | End: 2019-04-29

## 2019-04-29 RX ADMIN — METFORMIN HYDROCHLORIDE 1000 MG: 1000 TABLET ORAL at 08:06

## 2019-04-29 RX ADMIN — HYDROCODONE BITARTRATE AND ACETAMINOPHEN 2 TABLET: 5; 325 TABLET ORAL at 23:29

## 2019-04-29 RX ADMIN — AMIODARONE HYDROCHLORIDE 400 MG: 200 TABLET ORAL at 08:06

## 2019-04-29 RX ADMIN — METFORMIN HYDROCHLORIDE 1000 MG: 1000 TABLET ORAL at 17:01

## 2019-04-29 RX ADMIN — FUROSEMIDE 20 MG: 10 INJECTION, SOLUTION INTRAMUSCULAR; INTRAVENOUS at 10:52

## 2019-04-29 RX ADMIN — INSULIN LISPRO 2 UNITS: 100 INJECTION, SOLUTION INTRAVENOUS; SUBCUTANEOUS at 21:39

## 2019-04-29 RX ADMIN — POTASSIUM CHLORIDE 20 MEQ: 750 CAPSULE, EXTENDED RELEASE ORAL at 08:06

## 2019-04-29 RX ADMIN — MONTELUKAST SODIUM 10 MG: 10 TABLET, FILM COATED ORAL at 20:26

## 2019-04-29 RX ADMIN — PANTOPRAZOLE SODIUM 40 MG: 40 TABLET, DELAYED RELEASE ORAL at 06:42

## 2019-04-29 RX ADMIN — BUDESONIDE AND FORMOTEROL FUMARATE DIHYDRATE 2 PUFF: 160; 4.5 AEROSOL RESPIRATORY (INHALATION) at 10:59

## 2019-04-29 RX ADMIN — ENOXAPARIN SODIUM 40 MG: 40 INJECTION SUBCUTANEOUS at 08:07

## 2019-04-29 RX ADMIN — INSULIN LISPRO 10 UNITS: 100 INJECTION, SOLUTION INTRAVENOUS; SUBCUTANEOUS at 11:51

## 2019-04-29 RX ADMIN — BUDESONIDE AND FORMOTEROL FUMARATE DIHYDRATE 2 PUFF: 160; 4.5 AEROSOL RESPIRATORY (INHALATION) at 21:13

## 2019-04-29 RX ADMIN — SENNOSIDES AND DOCUSATE SODIUM 2 TABLET: 8.6; 5 TABLET ORAL at 20:26

## 2019-04-29 RX ADMIN — MUPIROCIN 1 APPLICATION: 20 OINTMENT TOPICAL at 20:26

## 2019-04-29 RX ADMIN — INSULIN LISPRO 2 UNITS: 100 INJECTION, SOLUTION INTRAVENOUS; SUBCUTANEOUS at 11:52

## 2019-04-29 RX ADMIN — ASPIRIN 325 MG: 325 TABLET, COATED ORAL at 08:06

## 2019-04-29 RX ADMIN — ATORVASTATIN CALCIUM 20 MG: 20 TABLET, FILM COATED ORAL at 20:26

## 2019-04-29 RX ADMIN — HYDROCODONE BITARTRATE AND ACETAMINOPHEN 2 TABLET: 5; 325 TABLET ORAL at 08:24

## 2019-04-29 RX ADMIN — METOPROLOL TARTRATE 25 MG: 25 TABLET ORAL at 08:06

## 2019-04-29 RX ADMIN — INSULIN GLARGINE 30 UNITS: 100 INJECTION, SOLUTION SUBCUTANEOUS at 06:42

## 2019-04-29 RX ADMIN — LINAGLIPTIN 5 MG: 5 TABLET, FILM COATED ORAL at 08:06

## 2019-04-29 RX ADMIN — GUAIFENESIN 1200 MG: 600 TABLET, EXTENDED RELEASE ORAL at 20:25

## 2019-04-29 RX ADMIN — AMIODARONE HYDROCHLORIDE 400 MG: 200 TABLET ORAL at 20:26

## 2019-04-29 RX ADMIN — GUAIFENESIN 1200 MG: 600 TABLET, EXTENDED RELEASE ORAL at 08:07

## 2019-04-29 RX ADMIN — ENOXAPARIN SODIUM 40 MG: 40 INJECTION SUBCUTANEOUS at 20:26

## 2019-04-29 RX ADMIN — METOPROLOL TARTRATE 25 MG: 25 TABLET ORAL at 20:26

## 2019-04-29 RX ADMIN — MUPIROCIN 1 APPLICATION: 20 OINTMENT TOPICAL at 08:07

## 2019-04-30 LAB
ALBUMIN SERPL-MCNC: 3.1 G/DL (ref 3.5–5.2)
ALP SERPL-CCNC: 54 U/L (ref 39–117)
ALT SERPL W P-5'-P-CCNC: 17 U/L (ref 1–41)
ANION GAP SERPL CALCULATED.3IONS-SCNC: 9.5 MMOL/L
AST SERPL-CCNC: 15 U/L (ref 1–40)
BILIRUB CONJ SERPL-MCNC: <0.2 MG/DL (ref 0.2–0.3)
BILIRUB INDIRECT SERPL-MCNC: ABNORMAL MG/DL
BILIRUB SERPL-MCNC: 0.4 MG/DL (ref 0.2–1.2)
BUN BLD-MCNC: 12 MG/DL (ref 6–20)
BUN/CREAT SERPL: 18.2 (ref 7–25)
CALCIUM SPEC-SCNC: 8.6 MG/DL (ref 8.6–10.5)
CHLORIDE SERPL-SCNC: 103 MMOL/L (ref 98–107)
CO2 SERPL-SCNC: 27.5 MMOL/L (ref 22–29)
CREAT BLD-MCNC: 0.66 MG/DL (ref 0.76–1.27)
DEPRECATED RDW RBC AUTO: 51.9 FL (ref 37–54)
ERYTHROCYTE [DISTWIDTH] IN BLOOD BY AUTOMATED COUNT: 15.8 % (ref 12.3–15.4)
GFR SERPL CREATININE-BSD FRML MDRD: 127 ML/MIN/1.73
GLUCOSE BLD-MCNC: 106 MG/DL (ref 65–99)
GLUCOSE BLDC GLUCOMTR-MCNC: 105 MG/DL (ref 70–130)
GLUCOSE BLDC GLUCOMTR-MCNC: 134 MG/DL (ref 70–130)
GLUCOSE BLDC GLUCOMTR-MCNC: 237 MG/DL (ref 70–130)
GLUCOSE BLDC GLUCOMTR-MCNC: 92 MG/DL (ref 70–130)
HCT VFR BLD AUTO: 29.3 % (ref 37.5–51)
HGB BLD-MCNC: 9.2 G/DL (ref 13–17.7)
MCH RBC QN AUTO: 28.5 PG (ref 26.6–33)
MCHC RBC AUTO-ENTMCNC: 31.4 G/DL (ref 31.5–35.7)
MCV RBC AUTO: 90.7 FL (ref 79–97)
PLATELET # BLD AUTO: 309 10*3/MM3 (ref 140–450)
PMV BLD AUTO: 9.3 FL (ref 6–12)
POTASSIUM BLD-SCNC: 3.8 MMOL/L (ref 3.5–5.2)
PROT SERPL-MCNC: 5.9 G/DL (ref 6–8.5)
RBC # BLD AUTO: 3.23 10*6/MM3 (ref 4.14–5.8)
SODIUM BLD-SCNC: 140 MMOL/L (ref 136–145)
WBC NRBC COR # BLD: 8.39 10*3/MM3 (ref 3.4–10.8)

## 2019-04-30 PROCEDURE — 94799 UNLISTED PULMONARY SVC/PX: CPT

## 2019-04-30 PROCEDURE — 25010000002 ONDANSETRON PER 1 MG: Performed by: THORACIC SURGERY (CARDIOTHORACIC VASCULAR SURGERY)

## 2019-04-30 PROCEDURE — 82962 GLUCOSE BLOOD TEST: CPT

## 2019-04-30 PROCEDURE — 80076 HEPATIC FUNCTION PANEL: CPT | Performed by: INTERNAL MEDICINE

## 2019-04-30 PROCEDURE — 80048 BASIC METABOLIC PNL TOTAL CA: CPT | Performed by: THORACIC SURGERY (CARDIOTHORACIC VASCULAR SURGERY)

## 2019-04-30 PROCEDURE — 99232 SBSQ HOSP IP/OBS MODERATE 35: CPT | Performed by: NURSE PRACTITIONER

## 2019-04-30 PROCEDURE — 25010000002 ENOXAPARIN PER 10 MG: Performed by: NURSE PRACTITIONER

## 2019-04-30 PROCEDURE — 85027 COMPLETE CBC AUTOMATED: CPT | Performed by: INTERNAL MEDICINE

## 2019-04-30 PROCEDURE — 99024 POSTOP FOLLOW-UP VISIT: CPT | Performed by: NURSE PRACTITIONER

## 2019-04-30 PROCEDURE — 63710000001 INSULIN GLARGINE PER 5 UNITS: Performed by: INTERNAL MEDICINE

## 2019-04-30 PROCEDURE — 93005 ELECTROCARDIOGRAM TRACING: CPT | Performed by: INTERNAL MEDICINE

## 2019-04-30 PROCEDURE — 63710000001 INSULIN LISPRO (HUMAN) PER 5 UNITS: Performed by: INTERNAL MEDICINE

## 2019-04-30 PROCEDURE — 99232 SBSQ HOSP IP/OBS MODERATE 35: CPT | Performed by: INTERNAL MEDICINE

## 2019-04-30 PROCEDURE — 93010 ELECTROCARDIOGRAM REPORT: CPT | Performed by: INTERNAL MEDICINE

## 2019-04-30 PROCEDURE — 25010000002 FUROSEMIDE PER 20 MG: Performed by: NURSE PRACTITIONER

## 2019-04-30 RX ORDER — HYDROCODONE BITARTRATE AND ACETAMINOPHEN 5; 325 MG/1; MG/1
2 TABLET ORAL EVERY 4 HOURS PRN
Qty: 60 TABLET | Refills: 0 | Status: SHIPPED | OUTPATIENT
Start: 2019-04-30 | End: 2019-05-04

## 2019-04-30 RX ORDER — AMIODARONE HYDROCHLORIDE 200 MG/1
200 TABLET ORAL EVERY 12 HOURS SCHEDULED
Status: DISCONTINUED | OUTPATIENT
Start: 2019-04-30 | End: 2019-05-01 | Stop reason: HOSPADM

## 2019-04-30 RX ORDER — METOPROLOL TARTRATE 50 MG/1
50 TABLET, FILM COATED ORAL EVERY 12 HOURS SCHEDULED
Status: DISCONTINUED | OUTPATIENT
Start: 2019-04-30 | End: 2019-05-01 | Stop reason: HOSPADM

## 2019-04-30 RX ORDER — GLIMEPIRIDE 2 MG/1
2 TABLET ORAL EVERY MORNING
Qty: 30 TABLET | Refills: 11 | Status: SHIPPED | OUTPATIENT
Start: 2019-04-30 | End: 2020-03-20

## 2019-04-30 RX ORDER — FUROSEMIDE 10 MG/ML
20 INJECTION INTRAMUSCULAR; INTRAVENOUS ONCE
Status: COMPLETED | OUTPATIENT
Start: 2019-04-30 | End: 2019-04-30

## 2019-04-30 RX ADMIN — POTASSIUM CHLORIDE 20 MEQ: 750 CAPSULE, EXTENDED RELEASE ORAL at 09:55

## 2019-04-30 RX ADMIN — INSULIN LISPRO 3 UNITS: 100 INJECTION, SOLUTION INTRAVENOUS; SUBCUTANEOUS at 11:17

## 2019-04-30 RX ADMIN — INSULIN GLARGINE 30 UNITS: 100 INJECTION, SOLUTION SUBCUTANEOUS at 09:54

## 2019-04-30 RX ADMIN — METOPROLOL TARTRATE 5 MG: 1 INJECTION, SOLUTION INTRAVENOUS at 11:16

## 2019-04-30 RX ADMIN — AMIODARONE HYDROCHLORIDE 200 MG: 200 TABLET ORAL at 21:19

## 2019-04-30 RX ADMIN — BUDESONIDE AND FORMOTEROL FUMARATE DIHYDRATE 2 PUFF: 160; 4.5 AEROSOL RESPIRATORY (INHALATION) at 09:27

## 2019-04-30 RX ADMIN — LINAGLIPTIN 5 MG: 5 TABLET, FILM COATED ORAL at 09:55

## 2019-04-30 RX ADMIN — METOPROLOL TARTRATE 50 MG: 50 TABLET, FILM COATED ORAL at 21:19

## 2019-04-30 RX ADMIN — MUPIROCIN 1 APPLICATION: 20 OINTMENT TOPICAL at 21:20

## 2019-04-30 RX ADMIN — INSULIN LISPRO 10 UNITS: 100 INJECTION, SOLUTION INTRAVENOUS; SUBCUTANEOUS at 11:16

## 2019-04-30 RX ADMIN — ENOXAPARIN SODIUM 40 MG: 40 INJECTION SUBCUTANEOUS at 09:55

## 2019-04-30 RX ADMIN — ASPIRIN 325 MG: 325 TABLET, COATED ORAL at 09:55

## 2019-04-30 RX ADMIN — METFORMIN HYDROCHLORIDE 1000 MG: 1000 TABLET ORAL at 09:55

## 2019-04-30 RX ADMIN — PANTOPRAZOLE SODIUM 40 MG: 40 TABLET, DELAYED RELEASE ORAL at 06:28

## 2019-04-30 RX ADMIN — AMIODARONE HYDROCHLORIDE 400 MG: 200 TABLET ORAL at 09:55

## 2019-04-30 RX ADMIN — SENNOSIDES AND DOCUSATE SODIUM 2 TABLET: 8.6; 5 TABLET ORAL at 21:19

## 2019-04-30 RX ADMIN — GUAIFENESIN 1200 MG: 600 TABLET, EXTENDED RELEASE ORAL at 21:19

## 2019-04-30 RX ADMIN — METOPROLOL TARTRATE 25 MG: 25 TABLET ORAL at 09:56

## 2019-04-30 RX ADMIN — MONTELUKAST SODIUM 10 MG: 10 TABLET, FILM COATED ORAL at 21:19

## 2019-04-30 RX ADMIN — ENOXAPARIN SODIUM 40 MG: 40 INJECTION SUBCUTANEOUS at 21:19

## 2019-04-30 RX ADMIN — BUDESONIDE AND FORMOTEROL FUMARATE DIHYDRATE 2 PUFF: 160; 4.5 AEROSOL RESPIRATORY (INHALATION) at 21:37

## 2019-04-30 RX ADMIN — GUAIFENESIN 1200 MG: 600 TABLET, EXTENDED RELEASE ORAL at 09:55

## 2019-04-30 RX ADMIN — FUROSEMIDE 20 MG: 10 INJECTION, SOLUTION INTRAMUSCULAR; INTRAVENOUS at 09:54

## 2019-04-30 RX ADMIN — METFORMIN HYDROCHLORIDE 1000 MG: 1000 TABLET ORAL at 18:18

## 2019-04-30 RX ADMIN — MUPIROCIN: 20 OINTMENT TOPICAL at 09:59

## 2019-04-30 RX ADMIN — ONDANSETRON HYDROCHLORIDE 4 MG: 2 SOLUTION INTRAMUSCULAR; INTRAVENOUS at 11:54

## 2019-04-30 RX ADMIN — ATORVASTATIN CALCIUM 20 MG: 20 TABLET, FILM COATED ORAL at 21:19

## 2019-05-01 ENCOUNTER — TELEPHONE (OUTPATIENT)
Dept: ENDOCRINOLOGY | Age: 54
End: 2019-05-01

## 2019-05-01 VITALS
HEART RATE: 105 BPM | OXYGEN SATURATION: 95 % | WEIGHT: 315 LBS | SYSTOLIC BLOOD PRESSURE: 107 MMHG | TEMPERATURE: 99.3 F | DIASTOLIC BLOOD PRESSURE: 70 MMHG | BODY MASS INDEX: 47.74 KG/M2 | HEIGHT: 68 IN | RESPIRATION RATE: 18 BRPM

## 2019-05-01 LAB
ANION GAP SERPL CALCULATED.3IONS-SCNC: 12 MMOL/L
BUN BLD-MCNC: 12 MG/DL (ref 6–20)
BUN/CREAT SERPL: 14.3 (ref 7–25)
CALCIUM SPEC-SCNC: 8.6 MG/DL (ref 8.6–10.5)
CHLORIDE SERPL-SCNC: 103 MMOL/L (ref 98–107)
CO2 SERPL-SCNC: 26 MMOL/L (ref 22–29)
CREAT BLD-MCNC: 0.84 MG/DL (ref 0.76–1.27)
GFR SERPL CREATININE-BSD FRML MDRD: 96 ML/MIN/1.73
GLUCOSE BLD-MCNC: 121 MG/DL (ref 65–99)
GLUCOSE BLDC GLUCOMTR-MCNC: 122 MG/DL (ref 70–130)
GLUCOSE BLDC GLUCOMTR-MCNC: 171 MG/DL (ref 70–130)
POTASSIUM BLD-SCNC: 4 MMOL/L (ref 3.5–5.2)
SODIUM BLD-SCNC: 141 MMOL/L (ref 136–145)

## 2019-05-01 PROCEDURE — 80048 BASIC METABOLIC PNL TOTAL CA: CPT | Performed by: THORACIC SURGERY (CARDIOTHORACIC VASCULAR SURGERY)

## 2019-05-01 PROCEDURE — 63710000001 INSULIN LISPRO (HUMAN) PER 5 UNITS: Performed by: INTERNAL MEDICINE

## 2019-05-01 PROCEDURE — 99239 HOSP IP/OBS DSCHRG MGMT >30: CPT | Performed by: NURSE PRACTITIONER

## 2019-05-01 PROCEDURE — 63710000001 INSULIN GLARGINE PER 5 UNITS: Performed by: INTERNAL MEDICINE

## 2019-05-01 PROCEDURE — 99024 POSTOP FOLLOW-UP VISIT: CPT | Performed by: THORACIC SURGERY (CARDIOTHORACIC VASCULAR SURGERY)

## 2019-05-01 PROCEDURE — 94799 UNLISTED PULMONARY SVC/PX: CPT

## 2019-05-01 PROCEDURE — 82962 GLUCOSE BLOOD TEST: CPT

## 2019-05-01 PROCEDURE — 25010000002 ENOXAPARIN PER 10 MG: Performed by: NURSE PRACTITIONER

## 2019-05-01 RX ORDER — AMIODARONE HYDROCHLORIDE 200 MG/1
200 TABLET ORAL EVERY 12 HOURS SCHEDULED
Qty: 30 TABLET | Refills: 0 | Status: SHIPPED | OUTPATIENT
Start: 2019-05-01 | End: 2019-08-20

## 2019-05-01 RX ORDER — POTASSIUM CHLORIDE 750 MG/1
20 CAPSULE, EXTENDED RELEASE ORAL DAILY
Qty: 14 CAPSULE | Refills: 0 | Status: SHIPPED | OUTPATIENT
Start: 2019-05-02 | End: 2021-05-14 | Stop reason: SDUPTHER

## 2019-05-01 RX ORDER — METOPROLOL TARTRATE 50 MG/1
25 TABLET, FILM COATED ORAL 3 TIMES DAILY
Qty: 90 TABLET | Refills: 11 | Status: SHIPPED | OUTPATIENT
Start: 2019-05-01 | End: 2019-08-20 | Stop reason: DRUGHIGH

## 2019-05-01 RX ORDER — FUROSEMIDE 20 MG/1
20 TABLET ORAL DAILY
Qty: 7 TABLET | Refills: 0 | Status: SHIPPED | OUTPATIENT
Start: 2019-05-01 | End: 2019-08-30

## 2019-05-01 RX ORDER — CEPHALEXIN 500 MG/1
500 CAPSULE ORAL EVERY 12 HOURS SCHEDULED
Status: DISCONTINUED | OUTPATIENT
Start: 2019-05-01 | End: 2019-05-01 | Stop reason: HOSPADM

## 2019-05-01 RX ORDER — ATORVASTATIN CALCIUM 20 MG/1
20 TABLET, FILM COATED ORAL NIGHTLY
Qty: 90 TABLET | Refills: 3 | Status: SHIPPED | OUTPATIENT
Start: 2019-05-01 | End: 2020-03-19

## 2019-05-01 RX ORDER — CEPHALEXIN 500 MG/1
500 CAPSULE ORAL EVERY 12 HOURS SCHEDULED
Qty: 14 CAPSULE | Refills: 0 | Status: SHIPPED | OUTPATIENT
Start: 2019-05-01 | End: 2019-05-08

## 2019-05-01 RX ORDER — PANTOPRAZOLE SODIUM 40 MG/1
40 TABLET, DELAYED RELEASE ORAL DAILY
Qty: 90 TABLET | Refills: 3 | Status: SHIPPED | OUTPATIENT
Start: 2019-05-01 | End: 2020-06-25

## 2019-05-01 RX ADMIN — POTASSIUM CHLORIDE 20 MEQ: 750 CAPSULE, EXTENDED RELEASE ORAL at 08:51

## 2019-05-01 RX ADMIN — GUAIFENESIN 1200 MG: 600 TABLET, EXTENDED RELEASE ORAL at 08:52

## 2019-05-01 RX ADMIN — METOPROLOL TARTRATE 50 MG: 50 TABLET, FILM COATED ORAL at 08:52

## 2019-05-01 RX ADMIN — ENOXAPARIN SODIUM 40 MG: 40 INJECTION SUBCUTANEOUS at 08:52

## 2019-05-01 RX ADMIN — METFORMIN HYDROCHLORIDE 1000 MG: 1000 TABLET ORAL at 08:51

## 2019-05-01 RX ADMIN — BUDESONIDE AND FORMOTEROL FUMARATE DIHYDRATE 2 PUFF: 160; 4.5 AEROSOL RESPIRATORY (INHALATION) at 08:13

## 2019-05-01 RX ADMIN — MUPIROCIN 1 APPLICATION: 20 OINTMENT TOPICAL at 08:52

## 2019-05-01 RX ADMIN — INSULIN LISPRO 2 UNITS: 100 INJECTION, SOLUTION INTRAVENOUS; SUBCUTANEOUS at 11:22

## 2019-05-01 RX ADMIN — PANTOPRAZOLE SODIUM 40 MG: 40 TABLET, DELAYED RELEASE ORAL at 06:34

## 2019-05-01 RX ADMIN — INSULIN GLARGINE 30 UNITS: 100 INJECTION, SOLUTION SUBCUTANEOUS at 08:52

## 2019-05-01 RX ADMIN — HYDROCODONE BITARTRATE AND ACETAMINOPHEN 2 TABLET: 5; 325 TABLET ORAL at 09:05

## 2019-05-01 RX ADMIN — CEPHALEXIN 500 MG: 500 CAPSULE ORAL at 11:21

## 2019-05-01 RX ADMIN — AMIODARONE HYDROCHLORIDE 200 MG: 200 TABLET ORAL at 08:51

## 2019-05-01 RX ADMIN — ASPIRIN 325 MG: 325 TABLET, COATED ORAL at 08:51

## 2019-05-01 RX ADMIN — INSULIN LISPRO 10 UNITS: 100 INJECTION, SOLUTION INTRAVENOUS; SUBCUTANEOUS at 11:21

## 2019-05-01 RX ADMIN — LINAGLIPTIN 5 MG: 5 TABLET, FILM COATED ORAL at 08:52

## 2019-05-01 NOTE — TELEPHONE ENCOUNTER
Spoke with patient to let him know that he is to D/C invokana  Continue the januvia and janumet  Patient voice understanding        ----- Message from Lilliana Null sent at 5/1/2019  2:45 PM EDT -----  Patient would like to be sure Dr. Weir wants him to discontinue invokana. He was told when he was released from the hospital today not to take it anymore. He also wants to know if he should continue taking the januvia.     He can be reached at [phone].    Thanks,  Kaylan

## 2019-05-01 NOTE — DISCHARGE SUMMARY
[facility]      Patient Name: Lauri Garcia  :1965  54 y.o.  LOS: 11  Encounter Provider: АНДРЕЙ Haines      Date of Admission: 2019    Date of Discharge:  2019    Treatment Team:  Patient Care Team:  Lauri Xie MD as PCP - General (Internal Medicine)    Discharge Condition: Stable  Discharge Diagnosis:    Acute pulmonary edema (CMS/HCC)    Sleep apnea    Type 2 diabetes mellitus without complication (CMS/HCC)    Hypertension    Asthma    Hypertriglyceridemia    Coronary artery disease involving native coronary artery of native heart without angina pectoris      History of Present Illness:  Lauri Garcia is a 54 y.o. male who was admitted on 2019 with chest pain.  Patient has a history of diabetes, asthma and obesity.  Patient states that he began having chest discomfort a few weeks ago that progressively worse.  In the emergency department troponin was 0.22 with a WBC count of 16,000 with ECG changes are showing some lateral ST segment depression.    Hospital Course:   Patient had an echocardiogram on 2019 which revealed an EF of 45% with severe hypokinesis of the inferior septum.  He subsequently underwent cardiac catheterization which revealed a normal left main, LAD with significant multiple 90% stenosis in the midportion extending to the diagonal branch, circumflex was codominant with 90% stenosis in the midportion and a 90% stenosis of the distal portion, RCA had subtotal occlusion in the midportion.  EF estimated at 45%.  Recommended that patient be considered for CABG.    On 2019 patient underwent CABG x4 with LIMA to mid LAD, saphenous vein to diagonal 2, saphenous vein to distal circumflex, saphenous vein to PDA.  During the postoperative state patient did have some sinus tachycardia.  Heart rate averaging in the 110s.  He was started on Lopressor in addition to the amiodarone.  Patient's postop course was unremarkable.  All tubes and  wires were removed in a timely fashion.  Patient has been ambulatory at the nursing station.  He was consulted by endocrinology who is made appropriate adjustments to his anti-diabetes regimen.  Patient does have some slight erythema with lymphangitis to the incisions to his right calf region where veins were retrieved.  Cardiothoracic surgery has recommended patient be discharged with a course of Keflex.    Patient will be discharged home with home health for physical therapy as well as skilled nursing.  He will be seen in the office by me in 1 week and Dr. Samuel in 4 weeks.  Follow-up appointments have been arranged for cardiothoracic surgery as well as endocrinology.      Objective:  Temp:  [97.6 °F (36.4 °C)-99.3 °F (37.4 °C)] 99.3 °F (37.4 °C)  Heart Rate:  [] 105  Resp:  [18-20] 18  BP: (107-125)/(67-79) 107/70    Intake/Output Summary (Last 24 hours) at 5/1/2019 1000  Last data filed at 5/1/2019 0700  Gross per 24 hour   Intake 840 ml   Output 1700 ml   Net -860 ml     Body mass index is 51.7 kg/m².      04/28/19  0638 04/29/19  0500 05/01/19  0551   Weight: (!) 154 kg (340 lb 6.4 oz) (!) 155 kg (341 lb 6.4 oz) (!) 154 kg (340 lb)     Weight change:     Physical Exam   Constitutional: He is oriented to person, place, and time. Vital signs are normal. He appears well-developed and well-nourished.   Eyes: Conjunctivae are normal.   Neck: Carotid bruit is not present.   Cardiovascular: Normal rate, regular rhythm and normal heart sounds.   No murmur heard.  Pulmonary/Chest: Effort normal and breath sounds normal.   Abdominal: Normal appearance.   Musculoskeletal: Normal range of motion.   No pedal edema   Neurological: He is alert and oriented to person, place, and time. GCS eye subscore is 4. GCS verbal subscore is 5. GCS motor subscore is 6.   Skin: Skin is warm and dry.   Midsternal incision is well approximated without signs of wound dehiscence, erythema, soft tissue swelling,  drainage.    Patient's incisions to his left calf region have some slight erythema with lymphangitis.     Psychiatric: He has a normal mood and affect. His speech is normal and behavior is normal. Judgment and thought content normal. Cognition and memory are normal.       Procedures Performed:  Procedure(s):  INTRAOP GUILLERMINA; CORONARY ARTERY BYPASS X 4 WITH LEFT INTERNAL MAMMARY ARTERY GRAFT AND UTILIZING ENDOSCOPICALLY HARVESTED LEFT GREATER SAPHENOUS VEIN; PRP         Pertinent Test Results:  Results from last 7 days   Lab Units 05/01/19  0319 04/30/19  0602 04/29/19  0355 04/28/19  0307 04/27/19  1658 04/27/19  0336 04/26/19 0357 04/25/19  0303   SODIUM mmol/L 141 140 137 140  --  138 133* 143   POTASSIUM mmol/L 4.0 3.8 4.0 3.7 3.6 4.1 4.5 4.3   CHLORIDE mmol/L 103 103 102 103  --  101 101 110*   CO2 mmol/L 26.0 27.5 23.0 25.2  --  26.0 21.1* 22.1   BUN mg/dL 12 12 14 18  --  19 23* 19   CREATININE mg/dL 0.84 0.66* 0.76 0.80  --  0.76 0.91 1.00   GLUCOSE mg/dL 121* 106* 118* 106*  --  165* 186* 121*   CALCIUM mg/dL 8.6 8.6 8.8 8.6  --  8.8 9.7 8.3*   AST (SGOT) U/L  --  15  --  18  --   --   --   --    ALT (SGPT) U/L  --  17  --  17  --   --   --   --          Results from last 7 days   Lab Units 04/30/19  0602 04/28/19  0307 04/27/19  0336 04/26/19 0357 04/25/19  2255 04/25/19 0303 04/24/19 2001   WBC 10*3/mm3 8.39 9.67 9.87 10.48 9.92 14.24* 17.23*   HEMOGLOBIN g/dL 9.2* 9.1* 8.7* 9.2* 9.1* 11.0* 12.0*   HEMATOCRIT % 29.3* 29.3* 28.1* 29.4* 29.4* 33.6* 36.3*   PLATELETS 10*3/mm3 309 202 161 166 152 219 236     Results from last 7 days   Lab Units 04/25/19  0303 04/24/19  1617 04/24/19  1506   INR  1.17* 1.27* 1.34*   APTT seconds  --  31.2 29.4     Results from last 7 days   Lab Units 04/25/19  0303 04/24/19 2001 04/24/19  1617   MAGNESIUM mg/dL 2.6 2.4 2.8*           Invalid input(s): LDLCALC            Discharge Diet:    Dietary Orders (From admission, onward)    Start     Ordered    04/27/19 1200  Dietary  Nutrition Supplements Boost Glucose Control (Glucerna Shake); chocolate  Daily With Breakfast, Lunch & Dinner     Question Answer Comment   Select Supplement: Boost Glucose Control (Glucerna Shake)    Flavor: chocolate        04/27/19 0949    04/26/19 1204  Diet Regular; Cardiac, Consistent Carbohydrate  Diet Effective Now     Question Answer Comment   Diet Texture / Consistency Regular    Common Modifiers Cardiac    Common Modifiers Consistent Carbohydrate        04/26/19 1203          Activity at Discharge:  Ad mally.    Discharge disposition: home     Discharge Medications:     Discharge Medications      New Medications      Instructions Start Date   amiodarone 200 MG tablet  Commonly known as:  PACERONE   200 mg, Oral, Every 12 Hours Scheduled      atorvastatin 20 MG tablet  Commonly known as:  LIPITOR   20 mg, Oral, Nightly      cephalexin 500 MG capsule  Commonly known as:  KEFLEX   500 mg, Oral, Every 12 Hours Scheduled      Empagliflozin 25 MG tablet  Commonly known as:  JARDIANCE   25 mg, Oral, Daily      furosemide 20 MG tablet  Commonly known as:  LASIX   20 mg, Oral, Daily      glimepiride 2 MG tablet  Commonly known as:  AMARYL   2 mg, Oral, Every Morning      HYDROcodone-acetaminophen 5-325 MG per tablet  Commonly known as:  NORCO   2 tablets, Oral, Every 4 Hours PRN      metoprolol tartrate 50 MG tablet  Commonly known as:  LOPRESSOR   25 mg, Oral, 3 Times Daily      pantoprazole 40 MG EC tablet  Commonly known as:  PROTONIX   40 mg, Oral, Daily      potassium chloride 10 MEQ CR capsule  Commonly known as:  MICRO-K   20 mEq, Oral, Daily   Start Date:  5/2/2019        Continue These Medications      Instructions Start Date   ACCU-CHEK FASTCLIX LANCETS misc   USE TO CHECK BLOOD SUGAR TWICE DAILY AND AS NEEDED      ACCU-CHEK SMARTVIEW test strip  Generic drug:  glucose blood   USE AS DIRECTED 2 TIMES A DAY      albuterol sulfate  (90 Base) MCG/ACT inhaler  Commonly known as:  PROVENTIL HFA;VENTOLIN  HFA;PROAIR HFA   2 puffs, Inhalation, Every 4 Hours PRN      allopurinol 300 MG tablet  Commonly known as:  ZYLOPRIM   300 mg, Oral, Every Evening      aspirin 81 MG tablet   81 mg, Oral, Daily      cetirizine 10 MG tablet  Commonly known as:  zyrTEC   10 mg, Oral, Daily      Cinnamon 500 MG capsule   500 mg, Oral, Daily      Coconut Oil 1000 MG capsule   2,000 mg, Oral, Daily      COLLAGEN PLUS VITAMIN C PO   1 tablet, Oral, Daily      fish oil 1000 MG capsule capsule   1,000 mg, Oral, Daily With Breakfast      Flaxseed Oil 1000 MG capsule   1,000 mg, Oral, Daily      fluticasone 50 MCG/ACT nasal spray  Commonly known as:  FLONASE   2 sprays, Nasal, Daily PRN      KLOR-CON/EF 25 MEQ disintegrating tablet  Generic drug:  potassium bicarbonate   50 mEq, Oral, Daily      lisinopril-hydrochlorothiazide 20-12.5 MG per tablet  Commonly known as:  ZESTORETIC   2 tablets, Oral, Daily      melatonin 5 MG tablet tablet   5 mg, Oral, Nightly      metFORMIN 1000 MG tablet  Commonly known as:  GLUCOPHAGE   1,000 mg, Oral, 2 Times Daily With Meals      montelukast 10 MG tablet  Commonly known as:  SINGULAIR   10 mg, Oral, Nightly      MULTIVITAL-M PO   1 tablet, Oral, Daily      SITagliptin 100 MG tablet  Commonly known as:  JANUVIA   100 mg, Oral, Daily      SYMBICORT 160-4.5 MCG/ACT inhaler  Generic drug:  budesonide-formoterol   2 puffs, Inhalation, 2 Times Daily - RT         Stop These Medications    INVOKANA 100 MG tablet  Generic drug:  Canagliflozin              Follow-up:  Future Appointments   Date Time Provider Department Center   5/10/2019  2:00 PM Angela Mendieta APRN [facility] None   6/5/2019 12:00 PM Sirisha Turner MD [facility] None   6/20/2019  1:00 PM Anthony Samuel MD [facility] None     Additional Instructions for the Follow-ups that You Need to Schedule     Ambulatory Referral to Home Health   As directed      Face to Face Visit Date:  5/1/2019    Follow-up Provider for Plan of Care?:  I  will be treating the patient on an ongoing basis.  Please send me the Plan of Care for signature.    Follow-up Provider:  OLAYINKA CLEMONS [[id number]]    Reason/Clinical Findings:  Strength building    Describe mobility limitations that make leaving home difficult:  post op    Nursing/Therapeutic Services Requested:  Physical Therapy Skilled Nursing    Skilled nursing orders:  Post CABG care Medication education Wound care dressing/changes    PT orders:  Strengthening    Frequency:  1 Week 1         Call MD With Problems / Concerns   As directed      Instructions:  Call office at [phone] for any drainage, increased redness, or fever over 100.5    Order Comments:  Instructions:  Call office at [phone] for any drainage, increased redness, or fever over 100.5          Discharge Follow-up with PCP   As directed       Currently Documented PCP:    Lauri Xie MD    PCP Phone Number:    [phone]     Follow Up Details:  in 1 week         Discharge Follow-up with Specialty: Cardiologist APRN/PA; 1 Week   As directed      Specialty:  Cardiologist APRN/PA    Follow Up:  1 Week    Follow Up Details:  bring all prescription bottles to appointment, call for appointment         Discharge Follow-up with Specified Provider: Cardiologist; 1 Month   As directed      To:  Cardiologist    Follow Up:  1 Month    Follow Up Details:  call for appointment, bring all medication bottles to appointment         Discharge Follow-up with Specified Provider:    As directed      To:      Follow Up Details:  4-6 weeks, bring all current medications to appointment         Referral to Home Health   As directed      Face to Face Visit Date:  4/30/2019    Follow-up Provider for Plan of Care?:  I will be treating the patient on an ongoing basis.  Please send me the Plan of Care for signature.    Follow-up Provider:  STACIA WHITFIELD [[id number]]    Reason/Clinical Findings:  post op open heart    Describe mobility limitations that make  leaving home difficult:  weakness    Nursing/Therapeutic Services Requested:  Skilled Nursing    Skilled nursing orders:  Post CABG care    Frequency:  1 Week 1               Time Spent on Discharge:  Greater than 30 min    АНДРЕЙ Haines  05/01/19  10:00 AM    EMR Dragon/Transcription disclaimer:   Much of this encounter note is an electronic transcription/translation of spoken language to printed text. The electronic translation of spoken language may permit erroneous, or at times, nonsensical words or phrases to be inadvertently transcribed; Although I have reviewed the note for such errors, some may still exist.

## 2019-05-01 NOTE — PROGRESS NOTES
LOS: 11 days   Patient Care Team:  Lauri Xie MD as PCP - General (Internal Medicine)    Chief Complaint: post op    Subjective      Vital Signs  Temp:  [97.6 °F (36.4 °C)-99.3 °F (37.4 °C)] 99.3 °F (37.4 °C)  Heart Rate:  [] 105  Resp:  [18-20] 18  BP: (107-125)/(67-79) 107/70  Body mass index is 51.7 kg/m².    Intake/Output Summary (Last 24 hours) at 5/1/2019 0952  Last data filed at 5/1/2019 0700  Gross per 24 hour   Intake 840 ml   Output 1700 ml   Net -860 ml     No intake/output data recorded.          04/28/19  0638 04/29/19  0500 05/01/19  0551   Weight: (!) 154 kg (340 lb 6.4 oz) (!) 155 kg (341 lb 6.4 oz) (!) 154 kg (340 lb)         Objective    Results Review:        WBC WBC   Date Value Ref Range Status   04/30/2019 8.39 3.40 - 10.80 10*3/mm3 Final      HGB Hemoglobin   Date Value Ref Range Status   04/30/2019 9.2 (L) 13.0 - 17.7 g/dL Final      HCT Hematocrit   Date Value Ref Range Status   04/30/2019 29.3 (L) 37.5 - 51.0 % Final      Platelets Platelets   Date Value Ref Range Status   04/30/2019 309 140 - 450 10*3/mm3 Final        PT/INR:  No results found for: PROTIME/No results found for: INR    Sodium Sodium   Date Value Ref Range Status   05/01/2019 141 136 - 145 mmol/L Final   04/30/2019 140 136 - 145 mmol/L Final   04/29/2019 137 136 - 145 mmol/L Final      Potassium Potassium   Date Value Ref Range Status   05/01/2019 4.0 3.5 - 5.2 mmol/L Final   04/30/2019 3.8 3.5 - 5.2 mmol/L Final   04/29/2019 4.0 3.5 - 5.2 mmol/L Final      Chloride Chloride   Date Value Ref Range Status   05/01/2019 103 98 - 107 mmol/L Final   04/30/2019 103 98 - 107 mmol/L Final   04/29/2019 102 98 - 107 mmol/L Final      Bicarbonate CO2   Date Value Ref Range Status   05/01/2019 26.0 22.0 - 29.0 mmol/L Final   04/30/2019 27.5 22.0 - 29.0 mmol/L Final   04/29/2019 23.0 22.0 - 29.0 mmol/L Final      BUN BUN   Date Value Ref Range Status   05/01/2019 12 6 - 20 mg/dL Final   04/30/2019 12 6 - 20 mg/dL Final    04/29/2019 14 6 - 20 mg/dL Final      Creatinine Creatinine   Date Value Ref Range Status   05/01/2019 0.84 0.76 - 1.27 mg/dL Final   04/30/2019 0.66 (L) 0.76 - 1.27 mg/dL Final   04/29/2019 0.76 0.76 - 1.27 mg/dL Final      Calcium Calcium   Date Value Ref Range Status   05/01/2019 8.6 8.6 - 10.5 mg/dL Final   04/30/2019 8.6 8.6 - 10.5 mg/dL Final   04/29/2019 8.8 8.6 - 10.5 mg/dL Final      Magnesium No results found for: MG         amiodarone 200 mg Oral Q12H   aspirin 325 mg Oral Daily   atorvastatin 20 mg Oral Nightly   budesonide-formoterol 2 puff Inhalation BID - RT   cephalexin 500 mg Oral Q12H   enoxaparin 40 mg Subcutaneous Q12H   guaiFENesin 1,200 mg Oral Q12H   insulin glargine 30 Units Subcutaneous QAM   insulin lispro 10 Units Subcutaneous TID With Meals   insulin lispro 2-5 Units Subcutaneous 4x Daily AC & at Bedtime   linagliptin 5 mg Oral Daily   metFORMIN 1,000 mg Oral BID With Meals   metoprolol tartrate 50 mg Oral Q12H   montelukast 10 mg Oral Nightly   mupirocin  Each Nare BID   pantoprazole 40 mg Oral Q AM   potassium chloride 20 mEq Oral Daily   sennosides-docusate sodium 2 tablet Oral Nightly              Patient Active Problem List   Diagnosis Code   • Sleep apnea G47.30   • Kidney stone N20.0   • Type 2 diabetes mellitus without complication (CMS/[facility]) E11.9   • Hypertension I10   • Asthma J45.909   • Allergic rhinitis J30.9   • Hypertriglyceridemia E78.1   • Acute pulmonary edema (CMS/[facility]) J81.0   • Coronary artery disease involving native coronary artery of native heart without angina pectoris I25.10       Assessment & Plan    -NSTEMI, CAD s/p CABGx4 with LIMA to LAD, EVH to left SVG-- POD#7(Turner)  -ICM, EF 30% (admit with pulm edema)----improved to 40% post op  -NIMA s/p cardiac cath----stable, wnl  -Hypertension------hypotension post op   -DM II, HbA1C 7.0----endocrinology following, TSH wnl  -Dyslipidemia-----elevated LFTs, statin on hold  -Asthma  -KRISTIN---CPAP at  [facility]  -Leukocytosis----probable reactive with recent steroid, resolved  -sinus tachycardia----4/26/19 echo ok     Heart rate better.  Mild erythema on left SVG site. Will give a few days of keflex.  Okay from our standpoint for discharge.  Follow up appointment with Dr. Turner on 6/5 at 12.    АНДРЕЙ Sánchez  05/01/19  9:52 AM     Continue tachycardia, asymptomatic.  Close follow-up by cardiology.

## 2019-05-01 NOTE — PLAN OF CARE
Problem: Fall Risk (Adult)  Goal: Absence of Fall  Outcome: Ongoing (interventions implemented as appropriate)      Problem: Skin Injury Risk (Adult)  Goal: Skin Health and Integrity  Outcome: Ongoing (interventions implemented as appropriate)      Problem: Patient Care Overview  Goal: Plan of Care Review  Outcome: Ongoing (interventions implemented as appropriate)   05/01/19 0523   Coping/Psychosocial   Plan of Care Reviewed With patient   Plan of Care Review   Progress improving   OTHER   Outcome Summary VSS. No complaints of pain. Room air. CPAP at night. Will continue to monitor       Problem: Pain, Acute (Adult)  Goal: Acceptable Pain Control/Comfort Level  Outcome: Ongoing (interventions implemented as appropriate)      Problem: Cardiac Surgery (Adult)  Goal: Signs and Symptoms of Listed Potential Problems Will be Absent, Minimized or Managed (Cardiac Surgery)  Outcome: Ongoing (interventions implemented as appropriate)

## 2019-05-01 NOTE — PROGRESS NOTES
Case Management Discharge Note    Final Note: Pt was dc'd home with formerly [facility]    Destination      No service has been selected for the patient.      Durable Medical Equipment      No service has been selected for the patient.      Dialysis/Infusion      No service has been selected for the patient.      Home Medical Care - Selection Complete      Service Provider Request Status Selected Services Address Phone Number Fax Number    [facility] [address] [address] [phone]      Therapy      No service has been selected for the patient.      Community Resources      No service has been selected for the patient.             Final Discharge Disposition Code: 01 - home or self-care

## 2019-05-02 ENCOUNTER — TELEPHONE (OUTPATIENT)
Dept: CARDIOLOGY | Facility: CLINIC | Age: 54
End: 2019-05-02

## 2019-05-02 ENCOUNTER — READMISSION MANAGEMENT (OUTPATIENT)
Dept: CALL CENTER | Facility: HOSPITAL | Age: 54
End: 2019-05-02

## 2019-05-02 NOTE — TELEPHONE ENCOUNTER
Iva with [facility] RN calls to confirm that the Pt needs to remain on both furosemide 20 mg QD, recently started at discharge 5/1/19, as well as lisinopril-HCTZ 20-12.5MG qd    Iva reports the Pts vitals today  116/70, 89 HR 96% rm air    No complaints at this time    Current cardiac meds  ASA 81 MG qd  Amiodarone 200 mg  bid  Atorvastatin 20 mg  qd  Furosemide 20 mg qd  Metoprolol 25 mg TID   Potassium 20 mEq  Qd  Lisinopril-hctz 20-12.5 mg qd    Iva [phone]

## 2019-05-03 ENCOUNTER — TELEPHONE (OUTPATIENT)
Dept: CARDIOLOGY | Facility: CLINIC | Age: 54
End: 2019-05-03

## 2019-05-03 ENCOUNTER — READMISSION MANAGEMENT (OUTPATIENT)
Dept: CALL CENTER | Facility: HOSPITAL | Age: 54
End: 2019-05-03

## 2019-05-03 NOTE — OUTREACH NOTE
Prep Survey      Responses   Facility patient discharged from?  [facility]   Is patient eligible?  Yes   Discharge diagnosis  Acute pulmonary edema, sleep apnea, DM II w/o complication, HTN, Asthma, Hypertriglyceridemia, CAD involving native coronary artery of native heart w/o angina, s/p cardiac cath, CABG x4 with LIMA to mid LAD, SVG to diagonal x2 and to distal CFX, SVG to PDA   Does the patient have one of the following disease processes/diagnoses(primary or secondary)?  Cardiothoracic surgery   Does the patient have Home health ordered?  Yes   What is the Home health agency?   [facility]   Is there a DME ordered?  No   Comments regarding appointments  See AVS   Prep survey completed?  Yes          Natalie Butler RN

## 2019-05-03 NOTE — OUTREACH NOTE
CT Surgery Week 1 Survey      Responses   Facility patient discharged from?  [place]   Does the patient have one of the following disease processes/diagnoses(primary or secondary)?  Cardiothoracic surgery   Is there a successful TCM telephone encounter documented?  No   Week 1 attempt successful?  No   Unsuccessful attempts  Attempt 1            Emeli Broussard RN

## 2019-05-03 NOTE — TELEPHONE ENCOUNTER
5/3/19  Scarlet with [employer] Disability/short term disability called - states they faxed paperwork on 4/29 to Dr. Samuel - asking when he was advised to stop work and if so when/how long - seed diagnosis; procedure, etc. Done on 4/24.  Can verify infor or just call this number and answer the questions over the  instead of filling the paperwork out.   [phone] claim#[id number]/[email or web address]

## 2019-05-04 ENCOUNTER — RESULTS ENCOUNTER (OUTPATIENT)
Dept: ENDOCRINOLOGY | Age: 54
End: 2019-05-04

## 2019-05-04 DIAGNOSIS — E78.5 HYPERLIPIDEMIA, UNSPECIFIED HYPERLIPIDEMIA TYPE: ICD-10-CM

## 2019-05-04 DIAGNOSIS — E11.9 TYPE 2 DIABETES MELLITUS WITHOUT COMPLICATION, WITHOUT LONG-TERM CURRENT USE OF INSULIN (HCC): ICD-10-CM

## 2019-05-04 DIAGNOSIS — E66.01 MORBID OBESITY (HCC): ICD-10-CM

## 2019-05-05 NOTE — TELEPHONE ENCOUNTER
Dion,    This patient was just in the hospital and had a CABG.  He would need to be off work from his admission day, and up to 3 months after the surgery.  Is there anyway you can take care of this?  Thanks     Jose

## 2019-05-06 ENCOUNTER — READMISSION MANAGEMENT (OUTPATIENT)
Dept: CALL CENTER | Facility: HOSPITAL | Age: 54
End: 2019-05-06

## 2019-05-06 NOTE — OUTREACH NOTE
CT Surgery Week 1 Survey      Responses   Facility patient discharged from?  [facility]   Does the patient have one of the following disease processes/diagnoses(primary or secondary)?  Cardiothoracic surgery   Is there a successful TCM telephone encounter documented?  No   Week 1 attempt successful?  No   Unsuccessful attempts  Attempt 2            Lauri Olea RN

## 2019-05-09 ENCOUNTER — READMISSION MANAGEMENT (OUTPATIENT)
Dept: CALL CENTER | Facility: HOSPITAL | Age: 54
End: 2019-05-09

## 2019-05-09 NOTE — OUTREACH NOTE
CT Surgery Week 2 Survey      Responses   Facility patient discharged from?  [place]   Does the patient have one of the following disease processes/diagnoses(primary or secondary)?  Cardiothoracic surgery   Week 2 attempt successful?  No   Unsuccessful attempts  Attempt 1          Natalie Lei RN

## 2019-05-09 NOTE — TELEPHONE ENCOUNTER
I called to follow up on pt's STD form and requested that another form be faxed just in case we have not received the initial form.  I will look on Debbie's desk for the initial form.

## 2019-05-10 ENCOUNTER — OFFICE VISIT (OUTPATIENT)
Dept: CARDIOLOGY | Facility: CLINIC | Age: 54
End: 2019-05-10

## 2019-05-10 VITALS
BODY MASS INDEX: 47.74 KG/M2 | OXYGEN SATURATION: 98 % | SYSTOLIC BLOOD PRESSURE: 116 MMHG | DIASTOLIC BLOOD PRESSURE: 76 MMHG | WEIGHT: 315 LBS | HEIGHT: 68 IN | HEART RATE: 89 BPM

## 2019-05-10 DIAGNOSIS — I25.10 CORONARY ARTERY DISEASE INVOLVING NATIVE CORONARY ARTERY OF NATIVE HEART WITHOUT ANGINA PECTORIS: Primary | ICD-10-CM

## 2019-05-10 DIAGNOSIS — Z95.1 S/P CABG X 4: ICD-10-CM

## 2019-05-10 DIAGNOSIS — E11.9 TYPE 2 DIABETES MELLITUS WITHOUT COMPLICATION, WITHOUT LONG-TERM CURRENT USE OF INSULIN (HCC): ICD-10-CM

## 2019-05-10 PROCEDURE — 99214 OFFICE O/P EST MOD 30 MIN: CPT | Performed by: NURSE PRACTITIONER

## 2019-05-10 NOTE — TELEPHONE ENCOUNTER
I have received pt's FMLA/STD papers. He will need to sign a medical record release and pay $15 fee.   I will be working on these papers today in between working clinic.  Dion PEREZ

## 2019-05-11 ENCOUNTER — READMISSION MANAGEMENT (OUTPATIENT)
Dept: CALL CENTER | Facility: HOSPITAL | Age: 54
End: 2019-05-11

## 2019-05-11 NOTE — OUTREACH NOTE
CT Surgery Week 2 Survey      Responses   Facility patient discharged from?  [place]   Does the patient have one of the following disease processes/diagnoses(primary or secondary)?  Cardiothoracic surgery   Week 2 attempt successful?  No   Unsuccessful attempts  Attempt 2          Mary Cole RN

## 2019-05-13 ENCOUNTER — TELEPHONE (OUTPATIENT)
Dept: CARDIAC SURGERY | Facility: CLINIC | Age: 54
End: 2019-05-13

## 2019-05-13 NOTE — TELEPHONE ENCOUNTER
[facility] nurse calls to report when patient was seen 5/11/19 the scab on his SVHS had come off. There was no drainage and patient denies fever or chills. Dr Turner will see in office today

## 2019-05-14 ENCOUNTER — TELEPHONE (OUTPATIENT)
Dept: CARDIOLOGY | Facility: CLINIC | Age: 54
End: 2019-05-14

## 2019-05-14 ENCOUNTER — CLINICAL SUPPORT (OUTPATIENT)
Dept: CARDIAC SURGERY | Facility: CLINIC | Age: 54
End: 2019-05-14

## 2019-05-14 ENCOUNTER — TELEPHONE (OUTPATIENT)
Dept: CARDIAC SURGERY | Facility: CLINIC | Age: 54
End: 2019-05-14

## 2019-05-14 DIAGNOSIS — I25.10 ATHEROSCLEROSIS OF CORONARY ARTERY, ANGINA PRESENCE UNSPECIFIED, UNSPECIFIED VESSEL OR LESION TYPE, UNSPECIFIED WHETHER NATIVE OR TRANSPLANTED HEART: ICD-10-CM

## 2019-05-14 DIAGNOSIS — I50.9 HEART FAILURE, UNSPECIFIED HF CHRONICITY, UNSPECIFIED HEART FAILURE TYPE (HCC): ICD-10-CM

## 2019-05-14 DIAGNOSIS — Z48.812 ENCOUNTER FOR SURGICAL AFTERCARE FOLLOWING SURGERY OF CIRCULATORY SYSTEM: Primary | ICD-10-CM

## 2019-05-14 DIAGNOSIS — I11.0 HYPERTENSIVE HEART DISEASE WITH HEART FAILURE (HCC): ICD-10-CM

## 2019-05-14 PROCEDURE — 99374 HOME HEALTH CARE SUPERVISION: CPT | Performed by: THORACIC SURGERY (CARDIOTHORACIC VASCULAR SURGERY)

## 2019-05-14 NOTE — TELEPHONE ENCOUNTER
Pt aware he needs to sign medical record release so our office can send records to [employer]/ FMLA/Disability Papers.   Once signed with fax to company.   Copy will be sent to pt's chart.   Dion PEREZ

## 2019-05-14 NOTE — TELEPHONE ENCOUNTER
Patient calls to report his SVHS is red and sore There is no warmth or swelling and he denies fever or chills. He will be seen by Dr Turner tomorrow 5/15/19 in office which is agreeable to him

## 2019-05-14 NOTE — TELEPHONE ENCOUNTER
The patient called and is concerned that his incision could be infected or something. He said its Red in color all around it and its hurts really bad. He just didn't know what he should do.    Pt #: [phone]    Thank you  Karely

## 2019-05-14 NOTE — TELEPHONE ENCOUNTER
Manuela called about her 's paperwork and wanted to know if she could just  the papers, take them to him and bring them back?  I spoke with Debbie and she said yes, she can  the Auth for use and disclosure of health information, have him sign it and bring it back and THEN she will fax the papers, etc.    I left Manuela a msg letting her know this and told her to call me back and let me know if she is coming to pick it up.  PRT

## 2019-05-15 ENCOUNTER — OFFICE VISIT (OUTPATIENT)
Dept: CARDIAC SURGERY | Facility: CLINIC | Age: 54
End: 2019-05-15

## 2019-05-15 VITALS
HEIGHT: 72 IN | RESPIRATION RATE: 20 BRPM | BODY MASS INDEX: 42.66 KG/M2 | HEART RATE: 94 BPM | TEMPERATURE: 97.4 F | SYSTOLIC BLOOD PRESSURE: 106 MMHG | WEIGHT: 315 LBS | DIASTOLIC BLOOD PRESSURE: 72 MMHG | OXYGEN SATURATION: 96 %

## 2019-05-15 DIAGNOSIS — Z95.1 S/P CABG X 4: Primary | ICD-10-CM

## 2019-05-15 PROCEDURE — 99024 POSTOP FOLLOW-UP VISIT: CPT | Performed by: NURSE PRACTITIONER

## 2019-05-15 RX ORDER — SULFAMETHOXAZOLE AND TRIMETHOPRIM 800; 160 MG/1; MG/1
1 TABLET ORAL 2 TIMES DAILY
Qty: 20 TABLET | Refills: 0 | Status: SHIPPED | OUTPATIENT
Start: 2019-05-15 | End: 2019-05-25

## 2019-05-16 PROBLEM — Z95.1 S/P CABG X 4: Status: ACTIVE | Noted: 2019-05-16

## 2019-05-16 NOTE — PROGRESS NOTES
"CARDIOVASCULAR SURGERY FOLLOW-UP PROGRESS NOTE  Chief Complaint: Follow-up, incision check        HPI:   Dear Dr. Xie, Lauri PEREZ MD and colleagues:    It was nice to see Lauri Garcia in follow up 3 weeks after surgery.  As you know, he is a 54 y.o. male with CAD who underwent CABG x4 on 4/24/2019 with Dr. Turner. He did well postoperatively and continues to do well. He comes in today complaining of redness on his leg incision.  His activity level has been fair.  His sternal incision is well approximated, there is dried serous drainage on the distal portion of his incision without erythema, fluctuance, or separation of tissue.  His left lower extremity graft site has approximately half an inch radius surrounding his graft site incision, there is no drainage, fluctuance, or edema.  See photo attached in media.  Physical Exam:         /72 (BP Location: Right arm, Patient Position: Sitting, Cuff Size: Adult)   Pulse 94   Temp 97.4 °F (36.3 °C) (Oral)   Resp 20   Ht 181.6 cm (71.5\")   Wt (!) 150 kg (331 lb 8 oz)   SpO2 96%   BMI 45.59 kg/m²   Heart:  regular rate and rhythm, S1, S2 normal, no murmur, click, rub or gallop  Lungs:  clear to auscultation bilaterally  Extremities:  no edema  Incision(s):  mid chest no significant erythema, mild amount of drainage, left leg erythema, sternum stable    Assessment/Plan:     S/P CABG. Overall, he is doing well.    Mild cellulitis of graft site, Bactrim DS x10 days    Return to clinic in 1 week(s) with no new studies for wound evaluation    No restrictions of activity.    Return to clinic if any signs or symptoms of infection or sternal instability develop       Thank you for allowing me to participate in the care of your patient.    Regards,  АНДРЕЙ Serrano      "

## 2019-05-21 ENCOUNTER — TELEPHONE (OUTPATIENT)
Dept: CARDIAC REHAB | Facility: HOSPITAL | Age: 54
End: 2019-05-21

## 2019-05-28 ENCOUNTER — OFFICE VISIT (OUTPATIENT)
Dept: ENDOCRINOLOGY | Age: 54
End: 2019-05-28

## 2019-05-28 VITALS
WEIGHT: 315 LBS | DIASTOLIC BLOOD PRESSURE: 62 MMHG | HEIGHT: 72 IN | BODY MASS INDEX: 42.66 KG/M2 | OXYGEN SATURATION: 98 % | HEART RATE: 84 BPM | SYSTOLIC BLOOD PRESSURE: 104 MMHG

## 2019-05-28 DIAGNOSIS — E66.01 MORBID OBESITY (HCC): ICD-10-CM

## 2019-05-28 DIAGNOSIS — E78.5 HYPERLIPIDEMIA, UNSPECIFIED HYPERLIPIDEMIA TYPE: ICD-10-CM

## 2019-05-28 DIAGNOSIS — E11.9 TYPE 2 DIABETES MELLITUS WITHOUT COMPLICATION, WITHOUT LONG-TERM CURRENT USE OF INSULIN (HCC): Primary | ICD-10-CM

## 2019-05-28 PROCEDURE — 99214 OFFICE O/P EST MOD 30 MIN: CPT | Performed by: INTERNAL MEDICINE

## 2019-05-28 NOTE — PROGRESS NOTES
54 y.o.    Patient Care Team:  Lauri Xie MD as PCP - General (Internal Medicine)    Chief Complaint:    HOSPITAL FOLLOW UP/ TYPE 2 DIABETES MELLITUS  Subjective     HPI    54-year-old white male is here as a follow-up for the management of type 2 diabetes mellitus.  Patient was seen by me when he was hospitalized in April 2019 for CABG..     Type 2 diabetes mellitus-diagnosed about 7 years ago.  Today in the clinic patient reports that he is on Januvia 100 mg oral daily, Jardiance 25 mg oral daily, metformin thousand milligrams oral twice daily, glimepiride 2 mg oral daily with breakfast.  Has been checking his blood sugars once a day.  Average blood sugars are around 100-150.  No significantly high blood sugars according to the patient however during the hospitalization he did have some of the blood sugars as high as 200s.  Last eye examination was in 2019, no history of diabetic retinopathy.  Has been having some tingling and numbness in his right thigh but no tingling or numbness in his bilateral feet.  No ulcers or amputations of his toes.  Status post CABG, no history of CKD or CVA.    Hyperlipidemia  On Lipitor 20 mg oral daily.    Reviewed primary care physician's/consulting physician documentation and lab results :     Interval History      The following portions of the patient's history were reviewed and updated as appropriate: allergies, current medications, past family history, past medical history, past social history, past surgical history and problem list.    Past Medical History:   Diagnosis Date   • Allergic rhinitis     controlled on meds, followed by allergist (Dr. Kuo)   • Allergic rhinitis    • Asthma     controlled on meds, followed by allergist (Dr. Kuo)   • Diabetes mellitus, type II (CMS/[facility])    • Hypertension    • Kidney stone     on allopurinol and klor-con (Dr. Peterson)   • Sleep apnea     on cpap, followed by sleep medicine   • Sleep apnea      Family History   Problem Relation  Age of Onset   • Diabetes Mother    • Hypertension Mother    • Coronary artery disease Father         (60's)     Social History     Socioeconomic History   • Marital status:      Spouse name: Not on file   • Number of children: Not on file   • Years of education: Not on file   • Highest education level: Not on file   Tobacco Use   • Smoking status: Never Smoker   • Smokeless tobacco: Never Used   Substance and Sexual Activity   • Alcohol use: No   • Drug use: No   • Sexual activity: Defer     No Known Allergies    Current Outpatient Medications:   •  ACCU-CHEK FASTCLIX LANCETS misc, USE TO CHECK BLOOD SUGAR TWICE DAILY AND AS NEEDED, Disp: 200 each, Rfl: 3  •  ACCU-CHEK SMARTVIEW test strip, USE AS DIRECTED 2 TIMES A DAY, Disp: 200 each, Rfl: 3  •  albuterol sulfate  (90 Base) MCG/ACT inhaler, Inhale 2 puffs Every 4 (Four) Hours As Needed for Wheezing., Disp: , Rfl:   •  allopurinol (ZYLOPRIM) 300 MG tablet, Take 300 mg by mouth Every Evening., Disp: , Rfl:   •  amiodarone (PACERONE) 200 MG tablet, Take 1 tablet by mouth Every 12 (Twelve) Hours., Disp: 30 tablet, Rfl: 0  •  aspirin 81 MG tablet, Take 1 tablet by mouth Daily., Disp: 30 tablet, Rfl: 11  •  atorvastatin (LIPITOR) 20 MG tablet, Take 1 tablet by mouth Every Night., Disp: 90 tablet, Rfl: 3  •  cetirizine (ZyrTEC) 10 MG tablet, Take 10 mg by mouth Daily., Disp: , Rfl:   •  Collagen-Vitamin C (COLLAGEN PLUS VITAMIN C PO), Take 1 tablet by mouth Daily., Disp: , Rfl:   •  Empagliflozin (JARDIANCE) 25 MG tablet, Take 25 mg by mouth Daily., Disp: 30 tablet, Rfl: 11  •  fluticasone (FLONASE) 50 MCG/ACT nasal spray, 2 sprays into the nostril(s) as directed by provider Daily As Needed for Rhinitis., Disp: , Rfl:   •  furosemide (LASIX) 20 MG tablet, Take 1 tablet by mouth Daily., Disp: 7 tablet, Rfl: 0  •  glimepiride (AMARYL) 2 MG tablet, Take 1 tablet by mouth Every Morning., Disp: 30 tablet, Rfl: 11  •  KLOR-CON/EF 25 MEQ disintegrating tablet,  "Take 50 mEq by mouth Daily., Disp: , Rfl: 4  •  lisinopril-hydrochlorothiazide (ZESTORETIC) 20-12.5 MG per tablet, Take 2 tablets by mouth Daily., Disp: 90 tablet, Rfl: 3  •  melatonin 5 MG tablet tablet, Take 5 mg by mouth nightly., Disp: , Rfl:   •  metFORMIN (GLUCOPHAGE) 1000 MG tablet, Take 1,000 mg by mouth 2 (Two) Times a Day With Meals., Disp: , Rfl:   •  metoprolol tartrate (LOPRESSOR) 50 MG tablet, Take 0.5 tablets by mouth 3 (Three) Times a Day., Disp: 90 tablet, Rfl: 11  •  montelukast (SINGULAIR) 10 MG tablet, Take 10 mg by mouth Every Night., Disp: , Rfl:   •  Multiple Vitamins-Minerals (MULTIVITAL-M PO), Take 1 tablet by mouth Daily., Disp: , Rfl:   •  pantoprazole (PROTONIX) 40 MG EC tablet, Take 1 tablet by mouth Daily., Disp: 90 tablet, Rfl: 3  •  potassium chloride (MICRO-K) 10 MEQ CR capsule, Take 2 capsules by mouth Daily., Disp: 14 capsule, Rfl: 0  •  SITagliptin (JANUVIA) 100 MG tablet, Take 100 mg by mouth Daily., Disp: , Rfl:   •  SYMBICORT 160-4.5 MCG/ACT inhaler, Inhale 2 puffs 2 (Two) Times a Day., Disp: , Rfl:         Review of Systems   Constitutional: Positive for appetite change (decrease). Negative for fatigue and fever.   Eyes: Negative for visual disturbance.   Respiratory: Negative for shortness of breath.    Cardiovascular: Positive for palpitations. Negative for leg swelling.   Gastrointestinal: Negative for abdominal pain and vomiting.   Endocrine: Negative for polydipsia and polyuria.   Musculoskeletal: Negative for joint swelling and neck pain.   Skin: Negative for rash.   Neurological: Negative for weakness and numbness.   Psychiatric/Behavioral: Negative for behavioral problems.       Objective       Vitals:    05/28/19 1344   BP: 104/62   Pulse: 84   SpO2: 98%   Weight: (!) 150 kg (331 lb)   Height: 181.6 cm (71.5\")     Body mass index is 45.52 kg/m².      Physical Exam   Constitutional: He is oriented to person, place, and time. He appears well-nourished.   obese   HENT: "   Head: Normocephalic and atraumatic.   Wide neck   Eyes: Conjunctivae and EOM are normal.   Neck: Normal range of motion. Neck supple. Carotid bruit is not present. No thyromegaly present.   Acanthosis nigricans   Cardiovascular: Normal rate and normal heart sounds.   Pulmonary/Chest: Effort normal and breath sounds normal. No stridor. No respiratory distress.   Abdominal: Soft. Bowel sounds are normal. He exhibits no distension. There is no tenderness.   Central obesity   Musculoskeletal: He exhibits no edema or tenderness.   Neurological: He is alert and oriented to person, place, and time.   Skin: Skin is warm and dry.   Psychiatric: He has a normal mood and affect. His behavior is normal.   Vitals reviewed.    Results Review:     I reviewed the patient's new clinical results and mentioned them above in HPI and in plan as well.    Medical records reviewed  Summary:done      Admission on 04/20/2019, Discharged on 05/01/2019   No results displayed because visit has over 200 results.        Lab Results   Component Value Date    HGBA1C 7.00 (H) 04/21/2019    HGBA1C 6.92 (H) 11/05/2018    HGBA1C 6.77 (H) 08/24/2018     Lab Results   Component Value Date    MICROALBUR <3.0 04/26/2018    CREATININE 0.84 05/01/2019     Imaging Results (most recent)     None                Assessment and Plan:    Lauri was seen today for diabetes.    Diagnoses and all orders for this visit:    Type 2 diabetes mellitus without complication, without long-term current use of insulin (CMS/[facility])  -     Hemoglobin A1c; Future  -     Basic Metabolic Panel; Future  -     Lipid Panel; Future  -     Microalbumin / Creatinine Urine Ratio - Urine, Clean Catch; Future  -     TSH; Future  -     T4, Free; Future  -     Vitamin B12 & Folate; Future  -     Vitamin D 25 Hydroxy; Future    Morbid obesity (CMS/[facility])  -     Hemoglobin A1c; Future  -     Basic Metabolic Panel; Future  -     Lipid Panel; Future  -     Microalbumin / Creatinine Urine Ratio -  "Urine, Clean Catch; Future  -     TSH; Future  -     T4, Free; Future  -     Vitamin B12 & Folate; Future  -     Vitamin D 25 Hydroxy; Future    Hyperlipidemia, unspecified hyperlipidemia type  -     Hemoglobin A1c; Future  -     Basic Metabolic Panel; Future  -     Lipid Panel; Future  -     Microalbumin / Creatinine Urine Ratio - Urine, Clean Catch; Future  -     TSH; Future  -     T4, Free; Future  -     Vitamin B12 & Folate; Future  -     Vitamin D 25 Hydroxy; Future      Type 2 diabetes mellitus-uncontrolled  Placed continuous glucose monitoring on the patient to assess his blood glucose trends.  Continue the current oral hypoglycemic agents  Based on the blood glucose trends would make further changes.    Hyperlipidemia  Continue Lipitor    Morbid obesity  Patient does not want to proceed with any bariatric surgery options.  Would continue to watch his diet restrictions for now.  Would be participating in cardiac rehab for the next 3 weeks    Reviewed Lab results with the patient.             Rakesh Weir MD  05/28/19    EMR Dragon / transcription disclaimer:     \"Dictated utilizing Dragon dictation\".  "

## 2019-05-31 ENCOUNTER — OFFICE VISIT (OUTPATIENT)
Dept: CARDIAC SURGERY | Facility: CLINIC | Age: 54
End: 2019-05-31

## 2019-05-31 VITALS
RESPIRATION RATE: 20 BRPM | TEMPERATURE: 98.4 F | HEART RATE: 77 BPM | HEIGHT: 72 IN | SYSTOLIC BLOOD PRESSURE: 115 MMHG | DIASTOLIC BLOOD PRESSURE: 76 MMHG | BODY MASS INDEX: 42.66 KG/M2 | OXYGEN SATURATION: 96 % | WEIGHT: 315 LBS

## 2019-05-31 DIAGNOSIS — Z95.1 S/P CABG (CORONARY ARTERY BYPASS GRAFT): Primary | ICD-10-CM

## 2019-05-31 PROCEDURE — 99024 POSTOP FOLLOW-UP VISIT: CPT | Performed by: THORACIC SURGERY (CARDIOTHORACIC VASCULAR SURGERY)

## 2019-05-31 NOTE — PROGRESS NOTES
Lauri Garcia is a 54 y.o. male s/p CABG. He denies any signs or symptoms of myocardial ischemia, cerebrovascular event, or infection. He reports good exercise tolerance.    Sternum is stable, incision is clean and dry.  His mediastinal chest tube site and left pleural chest tube site covered with clean scabs.  CTA B/L.   Lower extremity incisions are clean and dry.  There are clean scabs over these incisions.  GCS 15, no neurologic deficit.    He appears to be doing well. 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  I feel is also safe for him to begin cardiac rehab.      Because of his large size I recommend he continue sternal precautions until late July 2019.    I would like to see him again in 2 months to confirm complete healing of his sternal and lower extremity incisions.

## 2019-06-10 ENCOUNTER — OFFICE VISIT (OUTPATIENT)
Dept: CARDIAC REHAB | Facility: HOSPITAL | Age: 54
End: 2019-06-10

## 2019-06-10 VITALS
SYSTOLIC BLOOD PRESSURE: 100 MMHG | HEIGHT: 71 IN | DIASTOLIC BLOOD PRESSURE: 58 MMHG | HEART RATE: 80 BPM | BODY MASS INDEX: 44.1 KG/M2 | WEIGHT: 315 LBS | OXYGEN SATURATION: 98 %

## 2019-06-10 DIAGNOSIS — Z95.1 STATUS POST FOUR VESSEL CORONARY ARTERY BYPASS: Primary | ICD-10-CM

## 2019-06-10 PROCEDURE — 93798 PHYS/QHP OP CAR RHAB W/ECG: CPT

## 2019-06-10 NOTE — PROGRESS NOTES
Cardiac Rehab Initial Assessment      Name: Lauri Garcia  :1965 Allergies:Patient has no known allergies.   MRN: [id number] 54 y.o. Physician: Lauri Xie MD   Primary Diagnosis:    Diagnosis Plan   1. Status post four vessel coronary artery bypass      Event Date: 19   Specialist: Jimmie   Secondary Diagnosis:  Risk Stratification:Moderate Risk Note Author: Brandi Moore RN     Cardiovascular History: Comments 1st heart event     EXERCISE AT HOME  yes  30 min  4 days per week    EF: 40%      Source: echo          Ambulatory Status:Independent  Ambulatory Fall Risk Assessed on Initial Visit: yes 6 Minute Walk Pre- Cardiac Rehab:  Distance:[id number]      RPE: 3  Max. HR: 114       SPO2:86-98%    MET: 3.1  MPH: 2.8             RPD: 1  Resting BP: 100/58 LA, 102/60 RA    Peak BP: 108/60  Recovery BP: 94/60  Comments: No complaints.  Denied SOA, but O2 sat did drop 1/2 way through walk, but recovered upon taking deep breaths.      NUTRITION  Lipids:yes If yes, labs as follows;  Total: No components found for: CHOLESTEROL  HDL:   HDL Cholesterol   Date Value Ref Range Status   2019 38 (L) 40 - 60 mg/dL Final    Lipids continued:  LDL:  LDL Cholesterol    Date Value Ref Range Status   2019 34 0 - 100 mg/dL Final     Triglyceride: No components found for: TRIGLYCERIDE   Weight Management:                 Weight: 331.8  Height: 71                                   BMI: Body mass index is 46.28 kg/m².  Waist Circumference: 60.5  inches   Alcohol Use: none Diabetes:Yes,  Monitors BS at home- yes, Frequency: 2 times daily, Random BS: 88    Last HGBA1C with date if applicable:No components found for: A1C         SOCIAL HISTORY  Social History     Socioeconomic History   • Marital status:      Spouse name: Not on file   • Number of children: Not on file   • Years of education: Not on file   • Highest education level: Not on file   Tobacco Use   • Smoking status: Never Smoker   • Smokeless  "tobacco: Never Used   Substance and Sexual Activity   • Alcohol use: No   • Drug use: No   • Sexual activity: Defer       Educational Level (choose one that applies) post college graduate work or degree Learning Barriers:Ready to Learn    Family Support:yes    Living Arrangement: lives with their spouse    Risk Factors: Stress  No, Clinical Depression  No, Heredity  Yes If Yes father - CAB x 2 around 60 yrs old, living; mother-Afib well controlled, Hyperlipidemia  No, Diabetes  Yes If Yes: Do you check blood glucose daily  Yes Today's glucose level 88, Exercise prior to event  No If Yes: Activity NA, Minutes per Brendan, Days per week NA, Obesity  Yes and NA     Tobacco Adjunct: No        Comorbidities: Liver disease \"fatty liver\" about 10 yrs ago, Renal disease-huge L kidney stone 2010, Diabetes Mellitus and L ACL arthroscopy 2/99     PSYCHOSOCIAL  Clinical Depression: no    Stress: no     Assess presence or absence of depression using a valid screening tool: yes      PHYSICAL ASSESSMENT  Influenza vaccine: yes  Pneumococcal vaccine: yes          Angina: no    Describe angina scale of 0 - 4: 0 = none    Today are you having incisional pain? No. If, Yes, Scale: None        Today are you having any other pain? No. If, Yes, Scale: None     Diagnosed with Hypertension:yes    Heart Sounds: Normal heart sounds     Lung Sounds: normal air entry, lungs clear to auscultation         Assessment: Alert & oriented x 3.  Very well educated and ready to learn.  Midline incision looks good.  Left below the knee & ankle area incision looked bright pink/scabbed over and pt states he did have infection in those after surgery.   Orthopedic Problems: Has old left ACL injury but states no limitations with treadmill or bike. Has bilateral weak ankles, wears high top boots to support.    Are you being hurt, hit, or frightened by anyone at home or in your life? no    Are you being neglected by a caregiver? N/A Shoulder flexibility/Range of " motion: Above average     Recommended arm activity: Any    Chair sit and reach within: 0 inches   Leg flexibility: Average    Leg Strength/Balance/Five times sit to stand: 9 seconds.     Chose one: Average    Recommended stretching: Standing    Assessment: Patient has good ROM, flexibility and balance.      Family attends [place]: yes Time of arrival: 9:50 am  Time of departure: 10:55 am     Patient Goals: Would like to be able to walk 5 miles 3-[facility].  (Use the bicycle here to build up his strength so that he can do that.)  Would like to eventually get back to [employer] or [employer].  Learn more about heart healthy diet along with his carb counting.  To lose 3-5 pounds during the program with eventual goal weight of 250 lbs.          6/10/2019  9:51 AM  Brandi Moore RN

## 2019-06-12 ENCOUNTER — TREATMENT (OUTPATIENT)
Dept: CARDIAC REHAB | Facility: HOSPITAL | Age: 54
End: 2019-06-12

## 2019-06-12 DIAGNOSIS — Z95.1 STATUS POST FOUR VESSEL CORONARY ARTERY BYPASS: Primary | ICD-10-CM

## 2019-06-12 PROCEDURE — 93798 PHYS/QHP OP CAR RHAB W/ECG: CPT

## 2019-06-14 ENCOUNTER — TREATMENT (OUTPATIENT)
Dept: CARDIAC REHAB | Facility: HOSPITAL | Age: 54
End: 2019-06-14

## 2019-06-14 DIAGNOSIS — Z95.1 STATUS POST FOUR VESSEL CORONARY ARTERY BYPASS: Primary | ICD-10-CM

## 2019-06-14 PROCEDURE — 93798 PHYS/QHP OP CAR RHAB W/ECG: CPT

## 2019-06-17 ENCOUNTER — TREATMENT (OUTPATIENT)
Dept: CARDIAC REHAB | Facility: HOSPITAL | Age: 54
End: 2019-06-17

## 2019-06-17 DIAGNOSIS — Z95.1 STATUS POST FOUR VESSEL CORONARY ARTERY BYPASS: Primary | ICD-10-CM

## 2019-06-17 PROCEDURE — 93798 PHYS/QHP OP CAR RHAB W/ECG: CPT

## 2019-06-19 ENCOUNTER — TREATMENT (OUTPATIENT)
Dept: ENDOCRINOLOGY | Age: 54
End: 2019-06-19

## 2019-06-19 ENCOUNTER — TREATMENT (OUTPATIENT)
Dept: CARDIAC REHAB | Facility: HOSPITAL | Age: 54
End: 2019-06-19

## 2019-06-19 DIAGNOSIS — Z95.1 STATUS POST FOUR VESSEL CORONARY ARTERY BYPASS: Primary | ICD-10-CM

## 2019-06-19 DIAGNOSIS — E11.9 TYPE 2 DIABETES MELLITUS WITHOUT COMPLICATION, WITHOUT LONG-TERM CURRENT USE OF INSULIN (HCC): ICD-10-CM

## 2019-06-19 PROCEDURE — 95250 CONT GLUC MNTR PHYS/QHP EQP: CPT | Performed by: INTERNAL MEDICINE

## 2019-06-19 PROCEDURE — 93798 PHYS/QHP OP CAR RHAB W/ECG: CPT

## 2019-06-19 PROCEDURE — 95251 CONT GLUC MNTR ANALYSIS I&R: CPT | Performed by: INTERNAL MEDICINE

## 2019-06-19 NOTE — PROGRESS NOTES
Continues glucose monitoring data    Continuous glucose monitoring was placed on May 28    Patient wore continuous glucose monitoring-free style berta Pro from May 28-June 4  Average blood glucose reading was 113 mg/dL range  Estimated HbA1c 6-6.5%  Likelihood of low blood glucose levels was low  Likelihood of high blood glucose levels was low  Blood glucose trends showed decent blood glucose control    Current treatment regimen  Januvia 100 mg oral daily, Jardiance 25 mg oral daily, metformin thousand milligrams oral twice daily, glimepiride 2 mg oral daily with breakfast.      Changes to the treatment regimen  Continue the current oral hypoglycemic agents, no changes to his treatment.    Please send in a copy of the CGM download to the patient reassuring him on how decently his blood sugars are controlled

## 2019-06-20 ENCOUNTER — OFFICE VISIT (OUTPATIENT)
Dept: CARDIOLOGY | Facility: CLINIC | Age: 54
End: 2019-06-20

## 2019-06-20 VITALS
DIASTOLIC BLOOD PRESSURE: 66 MMHG | WEIGHT: 315 LBS | BODY MASS INDEX: 42.66 KG/M2 | HEIGHT: 72 IN | HEART RATE: 90 BPM | SYSTOLIC BLOOD PRESSURE: 106 MMHG

## 2019-06-20 DIAGNOSIS — I25.810 CORONARY ARTERY DISEASE INVOLVING CORONARY BYPASS GRAFT OF NATIVE HEART WITHOUT ANGINA PECTORIS: Primary | ICD-10-CM

## 2019-06-20 DIAGNOSIS — R00.0 SINUS TACHYCARDIA: ICD-10-CM

## 2019-06-20 DIAGNOSIS — I25.5 ISCHEMIC CARDIOMYOPATHY: ICD-10-CM

## 2019-06-20 PROCEDURE — 93000 ELECTROCARDIOGRAM COMPLETE: CPT | Performed by: INTERNAL MEDICINE

## 2019-06-20 PROCEDURE — 99214 OFFICE O/P EST MOD 30 MIN: CPT | Performed by: INTERNAL MEDICINE

## 2019-06-21 ENCOUNTER — TREATMENT (OUTPATIENT)
Dept: CARDIAC REHAB | Facility: HOSPITAL | Age: 54
End: 2019-06-21

## 2019-06-21 DIAGNOSIS — Z95.1 STATUS POST FOUR VESSEL CORONARY ARTERY BYPASS: Primary | ICD-10-CM

## 2019-06-21 PROCEDURE — 93798 PHYS/QHP OP CAR RHAB W/ECG: CPT

## 2019-06-24 ENCOUNTER — TREATMENT (OUTPATIENT)
Dept: CARDIAC REHAB | Facility: HOSPITAL | Age: 54
End: 2019-06-24

## 2019-06-24 DIAGNOSIS — Z95.1 STATUS POST FOUR VESSEL CORONARY ARTERY BYPASS: Primary | ICD-10-CM

## 2019-06-24 PROCEDURE — 93798 PHYS/QHP OP CAR RHAB W/ECG: CPT

## 2019-06-25 ENCOUNTER — TELEPHONE (OUTPATIENT)
Dept: ENDOCRINOLOGY | Age: 54
End: 2019-06-25

## 2019-06-26 ENCOUNTER — TREATMENT (OUTPATIENT)
Dept: CARDIAC REHAB | Facility: HOSPITAL | Age: 54
End: 2019-06-26

## 2019-06-26 DIAGNOSIS — Z95.1 STATUS POST FOUR VESSEL CORONARY ARTERY BYPASS: Primary | ICD-10-CM

## 2019-06-26 PROCEDURE — 93798 PHYS/QHP OP CAR RHAB W/ECG: CPT

## 2019-06-28 ENCOUNTER — TREATMENT (OUTPATIENT)
Dept: CARDIAC REHAB | Facility: HOSPITAL | Age: 54
End: 2019-06-28

## 2019-06-28 DIAGNOSIS — Z95.1 STATUS POST FOUR VESSEL CORONARY ARTERY BYPASS: Primary | ICD-10-CM

## 2019-06-28 PROCEDURE — 93798 PHYS/QHP OP CAR RHAB W/ECG: CPT

## 2019-07-01 ENCOUNTER — TREATMENT (OUTPATIENT)
Dept: CARDIAC REHAB | Facility: HOSPITAL | Age: 54
End: 2019-07-01

## 2019-07-01 DIAGNOSIS — Z95.1 STATUS POST FOUR VESSEL CORONARY ARTERY BYPASS: Primary | ICD-10-CM

## 2019-07-01 PROCEDURE — 93798 PHYS/QHP OP CAR RHAB W/ECG: CPT

## 2019-07-03 ENCOUNTER — TREATMENT (OUTPATIENT)
Dept: CARDIAC REHAB | Facility: HOSPITAL | Age: 54
End: 2019-07-03

## 2019-07-03 DIAGNOSIS — Z95.1 STATUS POST FOUR VESSEL CORONARY ARTERY BYPASS: Primary | ICD-10-CM

## 2019-07-03 PROCEDURE — 93798 PHYS/QHP OP CAR RHAB W/ECG: CPT

## 2019-07-05 ENCOUNTER — TREATMENT (OUTPATIENT)
Dept: CARDIAC REHAB | Facility: HOSPITAL | Age: 54
End: 2019-07-05

## 2019-07-05 DIAGNOSIS — Z95.1 STATUS POST FOUR VESSEL CORONARY ARTERY BYPASS: Primary | ICD-10-CM

## 2019-07-05 PROCEDURE — 93798 PHYS/QHP OP CAR RHAB W/ECG: CPT

## 2019-07-08 ENCOUNTER — TREATMENT (OUTPATIENT)
Dept: CARDIAC REHAB | Facility: HOSPITAL | Age: 54
End: 2019-07-08

## 2019-07-08 DIAGNOSIS — Z95.1 STATUS POST FOUR VESSEL CORONARY ARTERY BYPASS: Primary | ICD-10-CM

## 2019-07-08 PROCEDURE — 93798 PHYS/QHP OP CAR RHAB W/ECG: CPT

## 2019-07-10 ENCOUNTER — TREATMENT (OUTPATIENT)
Dept: CARDIAC REHAB | Facility: HOSPITAL | Age: 54
End: 2019-07-10

## 2019-07-10 DIAGNOSIS — Z95.1 STATUS POST FOUR VESSEL CORONARY ARTERY BYPASS: Primary | ICD-10-CM

## 2019-07-10 PROCEDURE — 93798 PHYS/QHP OP CAR RHAB W/ECG: CPT

## 2019-07-12 ENCOUNTER — TREATMENT (OUTPATIENT)
Dept: CARDIAC REHAB | Facility: HOSPITAL | Age: 54
End: 2019-07-12

## 2019-07-12 DIAGNOSIS — Z95.1 STATUS POST FOUR VESSEL CORONARY ARTERY BYPASS: Primary | ICD-10-CM

## 2019-07-12 PROCEDURE — 93798 PHYS/QHP OP CAR RHAB W/ECG: CPT

## 2019-07-14 NOTE — PROGRESS NOTES
Date of Office Visit: 2019  Encounter Provider: Anthony Samuel MD  Place of Service: [facility] CARDIOLOGY  Patient Name: Lauri Garcia  :1965    Chief complaint: Follow-up for coronary artery disease, ischemic cardiomyopathy,   and sinus tachycardia.    History of Present Illness:    I again had the pleasure of seeing the patient in cardiology office on 2019.  He is a   very pleasant 54 year-old white male with a history of coronary artery disease, ischemic   cardiomyopathy, diabetes, and sinus tachycardia who presents for follow-up.  Patient   presented on 2019 with progressively worsening dyspnea on exertion.  He had   been treated for presumptive asthma, although his shortness of breath was occurring   with minimal activity.  He was felt to have flash pulmonary edema upon arrival, and   required diuresis.  An echocardiogram obtained on 2019 showed inferior wall   motion abnormalities with an ejection fraction of 45%.  His troponin was positive, and   continued to increase.  He ultimately underwent a left heart catheterization on 2019   which showed severe three-vessel coronary artery disease.  Specifically, he had   multiple 90% lesions in the mid LAD, 90% lesions in the mid and distal left circumflex,   and a subtotally occluded mid RCA.  He underwent a four-vessel CABG on 2019   by Dr. Turner.  At that time, he had a LIMA to mid LAD, SVG to the second diagonal   branch, SVG to the distal left circumflex, and SVG to the PDA (the PDA was   codominant).  He did have generalized weakness and somewhat slow recovery   afterwards.  He also had some difficulty with sinus tachycardia afterwards,and this   was treated with metoprolol.  An echocardiogram on 2019 again confirmed severe   hypokinesis of the inferior wall and basal to mid inferolateral wall with an ejection   fraction of 40%.    The patient presents today for follow-up.  His left  leg incisions did have drainage and   erythema, and he has had 2 rounds of antibiotics.  However, these appear to be   healing at the present time.  He has been in cardiac rehab, and has not had any atrial   fibrillation.  He is slowly building up his strength.  He has not had any significant   shortness of breath other than with heavy activity.  He has not had any chest   discomfort.  He has been off of Lasix.    Past Medical History:   Diagnosis Date   • Allergic rhinitis     controlled on meds, followed by allergist (Dr. Kuo)   • Asthma     controlled on meds, followed by allergist (Dr. Kuo)   • Coronary artery disease    • Diabetes mellitus, type II (CMS/[facility])    • Hypertension    • Ischemic cardiomyopathy    • Kidney stone     on allopurinol and klor-con (Dr. Peterson)   • Sinus tachycardia    • Sleep apnea     on cpap, followed by sleep medicine       Past Surgical History:   Procedure Laterality Date   • CARDIAC CATHETERIZATION N/A 4/21/2019    Procedure: Left Heart Cath;  Surgeon: Tila Franco MD;  Location: [facility] CATH INVASIVE LOCATION;  Service: Cardiology   • CARDIAC CATHETERIZATION N/A 4/21/2019    Procedure: Coronary angiography;  Surgeon: Tila Franco MD;  Location: [facility] CATH INVASIVE LOCATION;  Service: Cardiology   • CARDIAC CATHETERIZATION N/A 4/21/2019    Procedure: Left ventriculography;  Surgeon: Tila Franco MD;  Location: [facility] CATH INVASIVE LOCATION;  Service: Cardiology   • CORONARY ARTERY BYPASS GRAFT N/A 4/24/2019    Procedure: INTRAOP GUILLERMINA; CORONARY ARTERY BYPASS X 4 WITH LEFT INTERNAL MAMMARY ARTERY GRAFT AND UTILIZING ENDOSCOPICALLY HARVESTED LEFT GREATER SAPHENOUS VEIN; PRP;  Surgeon: Sirisha Turner MD;  Location: [facility] OR;  Service: Cardiothoracic   • KIDNEY STONE SURGERY  12/2010    Kidney Stones Removed   • KNEE SURGERY Left 1999    ACL       Current Outpatient Medications on File Prior to Visit   Medication Sig Dispense Refill   •  ACCU-CHEK FASTCLIX LANCETS misc USE TO CHECK BLOOD SUGAR TWICE DAILY AND AS NEEDED 200 each 3   • ACCU-CHEK SMARTVIEW test strip USE AS DIRECTED 2 TIMES A  each 3   • albuterol sulfate  (90 Base) MCG/ACT inhaler Inhale 2 puffs Every 4 (Four) Hours As Needed for Wheezing.     • allopurinol (ZYLOPRIM) 300 MG tablet Take 300 mg by mouth Every Evening.     • amiodarone (PACERONE) 200 MG tablet Take 1 tablet by mouth Every 12 (Twelve) Hours. 30 tablet 0   • aspirin 81 MG tablet Take 1 tablet by mouth Daily. 30 tablet 11   • atorvastatin (LIPITOR) 20 MG tablet Take 1 tablet by mouth Every Night. 90 tablet 3   • cetirizine (ZyrTEC) 10 MG tablet Take 10 mg by mouth Daily.     • Collagen-Vitamin C (COLLAGEN PLUS VITAMIN C PO) Take 1 tablet by mouth Daily.     • Empagliflozin (JARDIANCE) 25 MG tablet Take 25 mg by mouth Daily. 30 tablet 11   • fluticasone (FLONASE) 50 MCG/ACT nasal spray 2 sprays into the nostril(s) as directed by provider Daily As Needed for Rhinitis.     • glimepiride (AMARYL) 2 MG tablet Take 1 tablet by mouth Every Morning. 30 tablet 11   • KLOR-CON/EF 25 MEQ disintegrating tablet Take 50 mEq by mouth Daily.  4   • lisinopril-hydrochlorothiazide (ZESTORETIC) 20-12.5 MG per tablet Take 2 tablets by mouth Daily. 90 tablet 3   • melatonin 5 MG tablet tablet Take 5 mg by mouth nightly.     • metFORMIN (GLUCOPHAGE) 1000 MG tablet Take 1,000 mg by mouth 2 (Two) Times a Day With Meals.     • metoprolol tartrate (LOPRESSOR) 25 MG tablet Take 25 mg by mouth 3 (Three) Times a Day.     • montelukast (SINGULAIR) 10 MG tablet Take 10 mg by mouth Every Night.     • Multiple Vitamins-Minerals (MULTIVITAL-M PO) Take 1 tablet by mouth Daily.     • pantoprazole (PROTONIX) 40 MG EC tablet Take 1 tablet by mouth Daily. 90 tablet 3   • SITagliptin (JANUVIA) 100 MG tablet Take 100 mg by mouth Daily.     • SYMBICORT 160-4.5 MCG/ACT inhaler Inhale 2 puffs 2 (Two) Times a Day.     • furosemide (LASIX) 20 MG tablet  "Take 1 tablet by mouth Daily. 7 tablet 0   • metoprolol tartrate (LOPRESSOR) 50 MG tablet Take 0.5 tablets by mouth 3 (Three) Times a Day. 90 tablet 11   • potassium chloride (MICRO-K) 10 MEQ CR capsule Take 2 capsules by mouth Daily. 14 capsule 0     No current facility-administered medications on file prior to visit.      Allergies as of 06/20/2019   • (No Known Allergies)     Social History     Socioeconomic History   • Marital status:      Spouse name: Not on file   • Number of children: Not on file   • Years of education: Not on file   • Highest education level: Not on file   Occupational History   • Occupation: Hospital contractor    Tobacco Use   • Smoking status: Never Smoker   • Smokeless tobacco: Never Used   Substance and Sexual Activity   • Alcohol use: No   • Drug use: No   • Sexual activity: Defer     Family History   Problem Relation Age of Onset   • Diabetes Mother    • Hypertension Mother    • Coronary artery disease Father         Father with CAD in his 60's        Review of Systems   Constitution: Positive for malaise/fatigue.   All other systems reviewed and are negative.     Objective:     Vitals:    06/20/19 1308   BP: 106/66   BP Location: Left arm   Patient Position: Sitting   Pulse: 90   Weight: (!) 152 kg (334 lb)   Height: 181.6 cm (71.5\")     Body mass index is 45.93 kg/m².    Physical Exam   Constitutional: He is oriented to person, place, and time. He appears well-developed and well-nourished.   HENT:   Head: Normocephalic and atraumatic.   Eyes: Conjunctivae are normal.   Neck: Neck supple.   Cardiovascular: Normal rate and regular rhythm. Exam reveals no friction rub.   No murmur heard.  Pulmonary/Chest: Effort normal and breath sounds normal. He has no rales.   Abdominal: Soft. There is no tenderness.   Musculoskeletal: He exhibits no edema.   Neurological: He is alert and oriented to person, place, and time.   Skin: Skin is warm.   Psychiatric: He has a normal mood and " affect. His behavior is normal.     Lab Review:     ECG 12 Lead  Date/Time: 6/20/2019 1:10 PM  Performed by: Anthony Samuel MD  Authorized by: Anthony Samuel MD   Comparison: compared with previous ECG from 4/30/2019  Similar to previous ECG  Rhythm: sinus rhythm  Rate: normal  BPM: 89  QRS axis: left  Other findings: poor R wave progression    Clinical impression: abnormal EKG  Comments: Inferior infarct, age undetermined           Cardiac Procedures:  1.  Left heart catheterization on 4/21/2019: The LAD had multiple 90% lesions in the   midportion extending into the diagonal branch.  The left circumflex was codominant   with a mid 90% lesion and distal 90% lesion.  The RCA was subtotally occluded at   the mid level.  2.  Status post four-vessel CABG on 4/24/2019 by Dr. Turner.  At that time, he had a   LIMA to mid LAD, SVG to second diagonal, SVG to distal left circumflex, and SVG to   the PDA.  3.  Echocardiogram on 4/26/2019: There was severe hypokinesis of the inferior wall   and basal to mid inferolateral wall.  The ejection fraction was 40%.  The right ventricle   was mildly dilated.  There was a small pericardial effusion.    Assessment:       Diagnosis Plan   1. Coronary artery disease involving coronary bypass graft of native heart without angina pectoris     2. Sinus tachycardia     3. Ischemic cardiomyopathy       Plan:       For the most part, he is progressing well.  He was somewhat slow to regain his strength in the hospital.  However, he is now doing cardiac rehab, and I think this will benefit him significantly.  He has not had any atrial fibrillation in cardiac rehab.  I have advised him to stop the amiodarone at this time.  He has been off of Lasix, and appears to be euvolemic.  He is going to try to lose weight, and I advised him to watch his blood pressure closely as he could develop hypotension with significant weight loss.  He is stable from a coronary artery disease standpoint, and  really has had no exertional symptoms.  Again, shortness of breath was his anginal equivalent, and he never had chest discomfort.  He does not appear to be in congestive heart failure.  He is taking lisinopril/HCTZ, and metoprolol.  His last ejection fraction was 40%.  I am somewhat concerned that his inferior wall may not recover given that the RCA was subtotally occluded, and there is evidence of an inferior infarct on his EKG.  However, I will plan on rechecking an echocardiogram in the near future to reevaluate his ejection fraction.  He will continue on the aspirin and Lipitor for life if possible.  I will plan on seeing him back in the office in 2 months.    I spent 30 minutes in the care of the patient.  Greater than 50% of this time was spent in face-to-face counseling regarding his progress after CABG, coronary artery disease, and cardiomyopathy management.    Coronary Artery Disease  Assessment  • The patient has no angina    Plan  • Lifestyle modifications discussed include adhering to a heart healthy diet, avoidance of tobacco products, maintenance of a healthy weight, medication compliance, regular exercise and regular monitoring of cholesterol and blood pressure    Subjective - Objective  • There is a history of past MI on or around 4/20/2019  • There is a history of previous coronary artery bypass graft on or around 4/24/2019  • Current antiplatelet therapy includes aspirin 81 mg  • The patient qualifies for cardiac rehabilitation, and is already participating in a cardiac rehab program        Heart Failure  Assessment  • NYHA class I - There is no limitation of physical activity. Physical activity does not cause fatigue, palpitations or shortness of breath.  • ACE inhibitor prescribed  • Beta blocker prescribed  • Diuretics prescribed  • The most recent ejection fraction is 40%  • The left ventricle was last assessed on 4/26/2019    Plan  • The patient has received heart failure education on the  following topics: dietary sodium restriction, medication instructions, minimizing or avoiding NSAID use, physical activity and minimizing alcohol intake  • The heart failure care plan was discussed with the patient today including: continuing the current program  •  The patient was not counseled about ICD or CRT-D implantation    Subjective/Objective    • Physical exam findings negative for rales and peripheral edema.

## 2019-07-15 ENCOUNTER — TREATMENT (OUTPATIENT)
Dept: CARDIAC REHAB | Facility: HOSPITAL | Age: 54
End: 2019-07-15

## 2019-07-15 DIAGNOSIS — Z95.1 STATUS POST FOUR VESSEL CORONARY ARTERY BYPASS: Primary | ICD-10-CM

## 2019-07-17 ENCOUNTER — TREATMENT (OUTPATIENT)
Dept: CARDIAC REHAB | Facility: HOSPITAL | Age: 54
End: 2019-07-17

## 2019-07-17 DIAGNOSIS — Z95.1 STATUS POST FOUR VESSEL CORONARY ARTERY BYPASS: Primary | ICD-10-CM

## 2019-07-17 PROCEDURE — 93798 PHYS/QHP OP CAR RHAB W/ECG: CPT

## 2019-07-19 ENCOUNTER — TREATMENT (OUTPATIENT)
Dept: CARDIAC REHAB | Facility: HOSPITAL | Age: 54
End: 2019-07-19

## 2019-07-19 DIAGNOSIS — Z95.1 STATUS POST FOUR VESSEL CORONARY ARTERY BYPASS: Primary | ICD-10-CM

## 2019-07-19 PROCEDURE — 93798 PHYS/QHP OP CAR RHAB W/ECG: CPT

## 2019-07-22 ENCOUNTER — TREATMENT (OUTPATIENT)
Dept: CARDIAC REHAB | Facility: HOSPITAL | Age: 54
End: 2019-07-22

## 2019-07-22 DIAGNOSIS — Z95.1 STATUS POST FOUR VESSEL CORONARY ARTERY BYPASS: Primary | ICD-10-CM

## 2019-07-22 PROCEDURE — 93798 PHYS/QHP OP CAR RHAB W/ECG: CPT

## 2019-07-24 ENCOUNTER — TREATMENT (OUTPATIENT)
Dept: CARDIAC REHAB | Facility: HOSPITAL | Age: 54
End: 2019-07-24

## 2019-07-24 DIAGNOSIS — Z95.1 STATUS POST FOUR VESSEL CORONARY ARTERY BYPASS: Primary | ICD-10-CM

## 2019-07-24 PROCEDURE — 93798 PHYS/QHP OP CAR RHAB W/ECG: CPT

## 2019-07-26 ENCOUNTER — TREATMENT (OUTPATIENT)
Dept: CARDIAC REHAB | Facility: HOSPITAL | Age: 54
End: 2019-07-26

## 2019-07-26 DIAGNOSIS — Z95.1 STATUS POST FOUR VESSEL CORONARY ARTERY BYPASS: Primary | ICD-10-CM

## 2019-07-26 PROCEDURE — 93798 PHYS/QHP OP CAR RHAB W/ECG: CPT

## 2019-07-29 ENCOUNTER — TREATMENT (OUTPATIENT)
Dept: CARDIAC REHAB | Facility: HOSPITAL | Age: 54
End: 2019-07-29

## 2019-07-29 DIAGNOSIS — Z95.1 STATUS POST FOUR VESSEL CORONARY ARTERY BYPASS: Primary | ICD-10-CM

## 2019-07-29 PROCEDURE — 93798 PHYS/QHP OP CAR RHAB W/ECG: CPT

## 2019-07-31 ENCOUNTER — OFFICE VISIT (OUTPATIENT)
Dept: CARDIAC SURGERY | Facility: CLINIC | Age: 54
End: 2019-07-31

## 2019-07-31 ENCOUNTER — TREATMENT (OUTPATIENT)
Dept: CARDIAC REHAB | Facility: HOSPITAL | Age: 54
End: 2019-07-31

## 2019-07-31 VITALS
HEART RATE: 79 BPM | TEMPERATURE: 97.9 F | BODY MASS INDEX: 42.66 KG/M2 | OXYGEN SATURATION: 98 % | HEIGHT: 72 IN | WEIGHT: 315 LBS | SYSTOLIC BLOOD PRESSURE: 130 MMHG | RESPIRATION RATE: 20 BRPM | DIASTOLIC BLOOD PRESSURE: 86 MMHG

## 2019-07-31 DIAGNOSIS — Z95.1 S/P CABG (CORONARY ARTERY BYPASS GRAFT): Primary | ICD-10-CM

## 2019-07-31 DIAGNOSIS — Z95.1 STATUS POST FOUR VESSEL CORONARY ARTERY BYPASS: Primary | ICD-10-CM

## 2019-07-31 PROCEDURE — 99024 POSTOP FOLLOW-UP VISIT: CPT | Performed by: THORACIC SURGERY (CARDIOTHORACIC VASCULAR SURGERY)

## 2019-07-31 PROCEDURE — 93798 PHYS/QHP OP CAR RHAB W/ECG: CPT

## 2019-08-02 ENCOUNTER — TREATMENT (OUTPATIENT)
Dept: CARDIAC REHAB | Facility: HOSPITAL | Age: 54
End: 2019-08-02

## 2019-08-02 DIAGNOSIS — Z95.1 STATUS POST FOUR VESSEL CORONARY ARTERY BYPASS: Primary | ICD-10-CM

## 2019-08-02 PROCEDURE — 93798 PHYS/QHP OP CAR RHAB W/ECG: CPT

## 2019-08-04 NOTE — PROGRESS NOTES
Lauri Garcia is a 54 y.o. male s/p CABG. He denies any signs or symptoms of myocardial ischemia, cerebrovascular event, or infection. He reports good exercise tolerance.    Sternum is stable, incision is clean, dry, and intact.   CTA B/L.   Lower extremity incisions are clean, dry and intact.  GCS 15, no neurologic deficit.    He appears to be doing well. Follow up with us will be on a PRN basis.

## 2019-08-05 ENCOUNTER — APPOINTMENT (OUTPATIENT)
Dept: CARDIAC REHAB | Facility: HOSPITAL | Age: 54
End: 2019-08-05

## 2019-08-07 ENCOUNTER — APPOINTMENT (OUTPATIENT)
Dept: CARDIAC REHAB | Facility: HOSPITAL | Age: 54
End: 2019-08-07

## 2019-08-09 ENCOUNTER — APPOINTMENT (OUTPATIENT)
Dept: CARDIAC REHAB | Facility: HOSPITAL | Age: 54
End: 2019-08-09

## 2019-08-12 ENCOUNTER — APPOINTMENT (OUTPATIENT)
Dept: CARDIAC REHAB | Facility: HOSPITAL | Age: 54
End: 2019-08-12

## 2019-08-14 ENCOUNTER — APPOINTMENT (OUTPATIENT)
Dept: CARDIAC REHAB | Facility: HOSPITAL | Age: 54
End: 2019-08-14

## 2019-08-16 ENCOUNTER — APPOINTMENT (OUTPATIENT)
Dept: CARDIAC REHAB | Facility: HOSPITAL | Age: 54
End: 2019-08-16

## 2019-08-16 ENCOUNTER — LAB (OUTPATIENT)
Dept: ENDOCRINOLOGY | Age: 54
End: 2019-08-16

## 2019-08-16 DIAGNOSIS — E66.01 MORBID OBESITY (HCC): ICD-10-CM

## 2019-08-16 DIAGNOSIS — E11.9 TYPE 2 DIABETES MELLITUS WITHOUT COMPLICATION, WITHOUT LONG-TERM CURRENT USE OF INSULIN (HCC): ICD-10-CM

## 2019-08-16 DIAGNOSIS — E78.5 HYPERLIPIDEMIA, UNSPECIFIED HYPERLIPIDEMIA TYPE: ICD-10-CM

## 2019-08-17 LAB
25(OH)D3+25(OH)D2 SERPL-MCNC: 39.5 NG/ML (ref 30–100)
ALBUMIN/CREAT UR: 6.6 MG/G CREAT (ref 0–30)
BUN SERPL-MCNC: 20 MG/DL (ref 6–20)
BUN/CREAT SERPL: 17.5 (ref 7–25)
CALCIUM SERPL-MCNC: 9.8 MG/DL (ref 8.6–10.5)
CHLORIDE SERPL-SCNC: 98 MMOL/L (ref 98–107)
CHOLEST SERPL-MCNC: 100 MG/DL (ref 0–200)
CO2 SERPL-SCNC: 28.3 MMOL/L (ref 22–29)
CREAT SERPL-MCNC: 1.14 MG/DL (ref 0.76–1.27)
CREAT UR-MCNC: 60.2 MG/DL
FOLATE SERPL-MCNC: >20 NG/ML (ref 4.78–24.2)
GLUCOSE SERPL-MCNC: 98 MG/DL (ref 65–99)
HBA1C MFR BLD: 6.1 % (ref 4.8–5.6)
HDLC SERPL-MCNC: 30 MG/DL (ref 40–60)
INTERPRETATION: NORMAL
LDLC SERPL CALC-MCNC: 47 MG/DL (ref 0–100)
Lab: NORMAL
MICROALBUMIN UR-MCNC: 4 UG/ML
POTASSIUM SERPL-SCNC: 4.5 MMOL/L (ref 3.5–5.2)
SODIUM SERPL-SCNC: 140 MMOL/L (ref 136–145)
T4 FREE SERPL-MCNC: 1.39 NG/DL (ref 0.93–1.7)
TRIGL SERPL-MCNC: 113 MG/DL (ref 0–150)
TSH SERPL DL<=0.005 MIU/L-ACNC: 2.49 MIU/ML (ref 0.27–4.2)
VIT B12 SERPL-MCNC: 344 PG/ML (ref 211–946)
VLDLC SERPL CALC-MCNC: 22.6 MG/DL

## 2019-08-19 ENCOUNTER — APPOINTMENT (OUTPATIENT)
Dept: CARDIAC REHAB | Facility: HOSPITAL | Age: 54
End: 2019-08-19

## 2019-08-20 ENCOUNTER — OFFICE VISIT (OUTPATIENT)
Dept: CARDIOLOGY | Facility: CLINIC | Age: 54
End: 2019-08-20

## 2019-08-20 VITALS
BODY MASS INDEX: 44.1 KG/M2 | SYSTOLIC BLOOD PRESSURE: 108 MMHG | DIASTOLIC BLOOD PRESSURE: 62 MMHG | WEIGHT: 315 LBS | HEART RATE: 86 BPM | HEIGHT: 71 IN | OXYGEN SATURATION: 97 %

## 2019-08-20 DIAGNOSIS — R00.0 SINUS TACHYCARDIA: ICD-10-CM

## 2019-08-20 DIAGNOSIS — I25.810 CORONARY ARTERY DISEASE INVOLVING CORONARY BYPASS GRAFT OF NATIVE HEART WITHOUT ANGINA PECTORIS: Primary | ICD-10-CM

## 2019-08-20 DIAGNOSIS — I25.5 ISCHEMIC CARDIOMYOPATHY: ICD-10-CM

## 2019-08-20 PROCEDURE — 99214 OFFICE O/P EST MOD 30 MIN: CPT | Performed by: INTERNAL MEDICINE

## 2019-08-20 RX ORDER — METOPROLOL SUCCINATE 50 MG/1
50 TABLET, EXTENDED RELEASE ORAL DAILY
Qty: 30 TABLET | Refills: 11 | Status: SHIPPED | OUTPATIENT
Start: 2019-08-20 | End: 2020-05-27

## 2019-08-21 ENCOUNTER — APPOINTMENT (OUTPATIENT)
Dept: CARDIAC REHAB | Facility: HOSPITAL | Age: 54
End: 2019-08-21

## 2019-08-23 ENCOUNTER — APPOINTMENT (OUTPATIENT)
Dept: CARDIAC REHAB | Facility: HOSPITAL | Age: 54
End: 2019-08-23

## 2019-08-25 NOTE — PROGRESS NOTES
Date of Office Visit: 2019  Encounter Provider: Anthony Samuel MD  Place of Service: [facility] CARDIOLOGY  Patient Name: Lauri Garcia  :1965    Chief complaint: Follow-up for coronary artery disease, ischemic cardiomyopathy,   and sinus tachycardia.    History of Present Illness:    I again had the pleasure of seeing the patient in cardiology office on 2019.  He is a   very pleasant 54 year-old white male with a history of coronary artery disease, ischemic   cardiomyopathy, diabetes, and sinus tachycardia who presents for follow-up.  Patient   presented on 2019 with progressively worsening dyspnea on exertion.  He had   been treated for presumptive asthma, although his shortness of breath was occurring   with minimal activity.  He was felt to have flash pulmonary edema upon arrival, and   required diuresis.  An echocardiogram obtained on 2019 showed inferior wall   motion abnormalities with an ejection fraction of 45%.  His troponin was positive, and   continued to increase.  He ultimately underwent a left heart catheterization on 2019   which showed severe three-vessel coronary artery disease.  Specifically, he had   multiple 90% lesions in the mid LAD, 90% lesions in the mid and distal left circumflex,   and a subtotally occluded mid RCA.  He underwent a four-vessel CABG on 2019   by Dr. Turner.  At that time, he had a LIMA to mid LAD, SVG to the second diagonal   branch, SVG to the distal left circumflex, and SVG to the PDA (the PDA was   codominant).  He did have generalized weakness and somewhat slow recovery   afterwards.  He also had some difficulty with sinus tachycardia afterwards,and this   was treated with metoprolol.  An echocardiogram on 2019 again confirmed severe   hypokinesis of the inferior wall and basal to mid inferolateral wall with an ejection   fraction of 40%.     The patient presents today for follow-up.  Overall,  he is actually doing excellent.  He has   a home sleep test scheduled for next week.  He has not had any chest pain or   shortness of breath.  He is ambulating well without difficulty.  He would like to switch his   metoprolol to Toprol-XL for ease of schedule.  His heart rate has been in the 80s mainly.    Past Medical History:   Diagnosis Date   • Allergic rhinitis     controlled on meds, followed by allergist (Dr. Kuo)   • Asthma     controlled on meds, followed by allergist (Dr. Kuo)   • Coronary artery disease    • Diabetes mellitus, type II (CMS/[facility])    • Hypertension    • Ischemic cardiomyopathy    • Kidney stone     on allopurinol and klor-con (Dr. Peterson)   • Sinus tachycardia    • Sleep apnea     on cpap, followed by sleep medicine       Past Surgical History:   Procedure Laterality Date   • CARDIAC CATHETERIZATION N/A 4/21/2019    Procedure: Left Heart Cath;  Surgeon: Tila Franco MD;  Location: [facility] CATH INVASIVE LOCATION;  Service: Cardiology   • CARDIAC CATHETERIZATION N/A 4/21/2019    Procedure: Coronary angiography;  Surgeon: Tila Franco MD;  Location: [facility] CATH INVASIVE LOCATION;  Service: Cardiology   • CARDIAC CATHETERIZATION N/A 4/21/2019    Procedure: Left ventriculography;  Surgeon: Tila Franco MD;  [facility];  Service: Cardiology   • CORONARY ARTERY BYPASS GRAFT N/A 4/24/2019    Procedure: INTRAOP GUILLERMINA; CORONARY ARTERY BYPASS X 4 WITH LEFT INTERNAL MAMMARY ARTERY GRAFT AND UTILIZING ENDOSCOPICALLY HARVESTED LEFT GREATER SAPHENOUS VEIN; PRP;  Surgeon: Sirisha Turner MD;  Location: [facility] OR;  Service: Cardiothoracic   • KIDNEY STONE SURGERY  12/2010    Kidney Stones Removed   • KNEE SURGERY Left 1999    ACL       Current Outpatient Medications on File Prior to Visit   Medication Sig Dispense Refill   • ACCU-CHEK FASTCLIX LANCETS misc USE TO CHECK BLOOD SUGAR TWICE DAILY AND AS NEEDED 200 each 3   • ACCU-CHEK SMARTVIEW  test strip USE AS DIRECTED 2 TIMES A  each 3   • albuterol sulfate  (90 Base) MCG/ACT inhaler Inhale 2 puffs Every 4 (Four) Hours As Needed for Wheezing.     • allopurinol (ZYLOPRIM) 300 MG tablet Take 300 mg by mouth Every Evening.     • aspirin 81 MG tablet Take 1 tablet by mouth Daily. 30 tablet 11   • atorvastatin (LIPITOR) 20 MG tablet Take 1 tablet by mouth Every Night. 90 tablet 3   • cetirizine (ZyrTEC) 10 MG tablet Take 10 mg by mouth Daily.     • Collagen-Vitamin C (COLLAGEN PLUS VITAMIN C PO) Take 1 tablet by mouth Daily.     • Empagliflozin (JARDIANCE) 25 MG tablet Take 25 mg by mouth Daily. 30 tablet 11   • fluticasone (FLONASE) 50 MCG/ACT nasal spray 2 sprays into the nostril(s) as directed by provider Daily As Needed for Rhinitis.     • furosemide (LASIX) 20 MG tablet Take 1 tablet by mouth Daily. 7 tablet 0   • glimepiride (AMARYL) 2 MG tablet Take 1 tablet by mouth Every Morning. 30 tablet 11   • KLOR-CON/EF 25 MEQ disintegrating tablet Take 50 mEq by mouth Daily.  4   • lisinopril-hydrochlorothiazide (ZESTORETIC) 20-12.5 MG per tablet Take 2 tablets by mouth Daily. 90 tablet 3   • melatonin 5 MG tablet tablet Take 5 mg by mouth nightly.     • metFORMIN (GLUCOPHAGE) 1000 MG tablet Take 1,000 mg by mouth 2 (Two) Times a Day With Meals.     • montelukast (SINGULAIR) 10 MG tablet Take 10 mg by mouth Every Night.     • Multiple Vitamins-Minerals (MULTIVITAL-M PO) Take 1 tablet by mouth Daily.     • pantoprazole (PROTONIX) 40 MG EC tablet Take 1 tablet by mouth Daily. 90 tablet 3   • potassium chloride (MICRO-K) 10 MEQ CR capsule Take 2 capsules by mouth Daily. 14 capsule 0   • SITagliptin (JANUVIA) 100 MG tablet Take 100 mg by mouth Daily.     • SYMBICORT 160-4.5 MCG/ACT inhaler Inhale 2 puffs 2 (Two) Times a Day.       No current facility-administered medications on file prior to visit.      Allergies as of 08/20/2019   • (No Known Allergies)     Social History     Socioeconomic History  "  • Marital status:      Spouse name: Not on file   • Number of children: Not on file   • Years of education: Not on file   • Highest education level: Not on file   Occupational History   • Occupation: Hospital contractor    Tobacco Use   • Smoking status: Never Smoker   • Smokeless tobacco: Never Used   Substance and Sexual Activity   • Alcohol use: No   • Drug use: No   • Sexual activity: Defer     Family History   Problem Relation Age of Onset   • Diabetes Mother    • Hypertension Mother    • Coronary artery disease Father         Father with CAD in his 60's        Review of Systems   All other systems reviewed and are negative.     Objective:     Vitals:    08/20/19 1258   BP: 108/62   Pulse: 86   SpO2: 97%   Weight: (!) 152 kg (335 lb 9.6 oz)   Height: 181.6 cm (71.5\")     Body mass index is 46.16 kg/m².    Physical Exam   Constitutional: He is oriented to person, place, and time. He appears well-developed and well-nourished.   HENT:   Head: Normocephalic and atraumatic.   Eyes: Conjunctivae are normal.   Neck: Neck supple.   Cardiovascular: Normal rate and regular rhythm. Exam reveals no gallop and no friction rub.   No murmur heard.  Pulmonary/Chest: Effort normal and breath sounds normal.   Abdominal: Soft. There is no tenderness.   Musculoskeletal: He exhibits no edema.   Neurological: He is alert and oriented to person, place, and time.   Skin: Skin is warm.   Psychiatric: He has a normal mood and affect. His behavior is normal.     Lab Review:   Procedures    Cardiac Procedures:  1.  Left heart catheterization on 4/21/2019: The LAD had multiple 90% lesions in the   midportion extending into the diagonal branch.  The left circumflex was codominant   with a mid 90% lesion and distal 90% lesion.  The RCA was subtotally occluded at   the mid level.  2.  Status post four-vessel CABG on 4/24/2019 by Dr. Turner.  At that time, he had a   LIMA to mid LAD, SVG to second diagonal, SVG to distal left " circumflex, and SVG to   the PDA.  3.  Echocardiogram on 4/26/2019: There was severe hypokinesis of the inferior wall   and basal to mid inferolateral wall.  The ejection fraction was 40%.  The right ventricle   was mildly dilated.  There was a small pericardial effusion.    Assessment:       Diagnosis Plan   1. Coronary artery disease involving coronary bypass graft of native heart without angina pectoris     2. Ischemic cardiomyopathy  Adult Transthoracic Echo Complete W/ Cont if Necessary Per Protocol   3. Sinus tachycardia       Plan:       Overall, he seems to be doing excellent.  He does have a home sleep study test scheduled for next week.  I feel this will be very beneficial if he does truly have sleep apnea.  I will change his metoprolol to Toprol-XL at this point.  He is currently on 25 mg 3 times a day.  I am going to start him on the extended release at 50 mg/day.  This can be increased if needed.  I did explain to him that he may have palpitations or mild tachycardia during the switch.  He is stable from a coronary artery disease standpoint.  He will continue on the aspirin, Lipitor, Zestoretic, and metoprolol (now metoprolol succinate).  He appears to be euvolemic, and he will continue on the Lasix at 20 mg/day.  His last ejection fraction was 40%, and his inferior wall was severely hypokinetic.  I am concerned that this may not recover because the RCA was subtotally occluded.  However, at this point, an echocardiogram should be obtained within the next several months.  I am going to plan on doing this in December 2019 to reevaluate and see if the ejection fraction improves with revascularization.  He will continue on the Lipitor as well.  I will plan on seeing him back in the office within the next 6 months unless other issues arise.    Coronary Artery Disease  Assessment  • The patient has no angina    Plan  • Lifestyle modifications discussed include adhering to a heart healthy diet, avoidance of  tobacco products, maintenance of a healthy weight, medication compliance, regular exercise and regular monitoring of cholesterol and blood pressure    Subjective - Objective  • There is a history of past MI on or around 4/20/2019  • There is a history of previous coronary artery bypass graft on or around 4/24/2019  • Current antiplatelet therapy includes aspirin 81 mg  • The patient qualifies for cardiac rehabilitation, and is already participating in a cardiac rehab program

## 2019-08-26 ENCOUNTER — APPOINTMENT (OUTPATIENT)
Dept: CARDIAC REHAB | Facility: HOSPITAL | Age: 54
End: 2019-08-26

## 2019-08-28 ENCOUNTER — APPOINTMENT (OUTPATIENT)
Dept: CARDIAC REHAB | Facility: HOSPITAL | Age: 54
End: 2019-08-28

## 2019-08-30 ENCOUNTER — APPOINTMENT (OUTPATIENT)
Dept: CARDIAC REHAB | Facility: HOSPITAL | Age: 54
End: 2019-08-30

## 2019-08-30 ENCOUNTER — OFFICE VISIT (OUTPATIENT)
Dept: ENDOCRINOLOGY | Age: 54
End: 2019-08-30

## 2019-08-30 VITALS
OXYGEN SATURATION: 96 % | BODY MASS INDEX: 42.66 KG/M2 | DIASTOLIC BLOOD PRESSURE: 66 MMHG | WEIGHT: 315 LBS | HEART RATE: 81 BPM | SYSTOLIC BLOOD PRESSURE: 110 MMHG | HEIGHT: 72 IN

## 2019-08-30 DIAGNOSIS — E11.9 TYPE 2 DIABETES MELLITUS WITHOUT COMPLICATION, WITHOUT LONG-TERM CURRENT USE OF INSULIN (HCC): Primary | ICD-10-CM

## 2019-08-30 DIAGNOSIS — E78.5 HYPERLIPIDEMIA, UNSPECIFIED HYPERLIPIDEMIA TYPE: ICD-10-CM

## 2019-08-30 DIAGNOSIS — E66.01 MORBID OBESITY (HCC): ICD-10-CM

## 2019-08-30 PROCEDURE — 99214 OFFICE O/P EST MOD 30 MIN: CPT | Performed by: INTERNAL MEDICINE

## 2019-08-30 NOTE — PROGRESS NOTES
54 y.o.    Patient Care Team:  Lauri Xie MD as PCP - General (Internal Medicine)    Chief Complaint:    FOLLOW UP/ TYPE 2 DIABETES  MELLITUS  Subjective     HPI    54-year-old white male is here as a follow-up for the management of type 2 diabetes mellitus.  Patient was seen by me when he was hospitalized in April 2019 for CABG..     Type 2 diabetes mellitus-diagnosed about 7 years ago.  Today in clinic patient reports that he is on Januvia 100 mg oral daily, Jardiance 25 mg oral daily, metformin thousand milligrams oral twice daily, glimepiride 2 mg oral daily with breakfast.  Has been checking blood sugars 1-2 times a day.  Average blood sugars are around 100-140.  No significant high or low blood sugars.  Last eye examination was in 2019, no history of diabetic retinopathy.  Has been having some tingling and numbness in his right thigh but no tingling or numbness in his bilateral feet.  No ulcers or amputations of his toes.  Status post CABG, no history of CKD or CVA.  On lisinopril    Hyperlipidemia  On Lipitor 20 mg oral daily.     Reviewed primary care physician's/consulting physician documentation and lab results :     Interval History      The following portions of the patient's history were reviewed and updated as appropriate: allergies, current medications, past family history, past medical history, past social history, past surgical history and problem list.    Past Medical History:   Diagnosis Date   • Allergic rhinitis     controlled on meds, followed by allergist (Dr. Kuo)   • Asthma     controlled on meds, followed by allergist (Dr. Kuo)   • Coronary artery disease    • Diabetes mellitus, type II (CMS/[facility])    • Hypertension    • Ischemic cardiomyopathy    • Kidney stone     on allopurinol and klor-con (Dr. Peterson)   • Sinus tachycardia    • Sleep apnea     on cpap, followed by sleep medicine     Family History   Problem Relation Age of Onset   • Diabetes Mother    • Hypertension Mother     • Coronary artery disease Father         Father with CAD in his 60's      Social History     Socioeconomic History   • Marital status:      Spouse name: Not on file   • Number of children: Not on file   • Years of education: Not on file   • Highest education level: Not on file   Occupational History   • Occupation: Hospital contractor    Tobacco Use   • Smoking status: Never Smoker   • Smokeless tobacco: Never Used   Substance and Sexual Activity   • Alcohol use: No   • Drug use: No   • Sexual activity: Defer     No Known Allergies    Current Outpatient Medications:   •  albuterol sulfate  (90 Base) MCG/ACT inhaler, Inhale 2 puffs Every 4 (Four) Hours As Needed for Wheezing., Disp: , Rfl:   •  allopurinol (ZYLOPRIM) 300 MG tablet, Take 300 mg by mouth Every Evening., Disp: , Rfl:   •  aspirin 81 MG tablet, Take 1 tablet by mouth Daily., Disp: 30 tablet, Rfl: 11  •  atorvastatin (LIPITOR) 20 MG tablet, Take 1 tablet by mouth Every Night., Disp: 90 tablet, Rfl: 3  •  cetirizine (ZyrTEC) 10 MG tablet, Take 10 mg by mouth Daily., Disp: , Rfl:   •  Collagen-Vitamin C (COLLAGEN PLUS VITAMIN C PO), Take 1 tablet by mouth Daily., Disp: , Rfl:   •  Empagliflozin (JARDIANCE) 25 MG tablet, Take 25 mg by mouth Daily., Disp: 30 tablet, Rfl: 11  •  fluticasone (FLONASE) 50 MCG/ACT nasal spray, 2 sprays into the nostril(s) as directed by provider Daily As Needed for Rhinitis., Disp: , Rfl:   •  glimepiride (AMARYL) 2 MG tablet, Take 1 tablet by mouth Every Morning., Disp: 30 tablet, Rfl: 11  •  KLOR-CON/EF 25 MEQ disintegrating tablet, Take 50 mEq by mouth Daily., Disp: , Rfl: 4  •  lisinopril-hydrochlorothiazide (ZESTORETIC) 20-12.5 MG per tablet, Take 2 tablets by mouth Daily., Disp: 90 tablet, Rfl: 3  •  melatonin 5 MG tablet tablet, Take 5 mg by mouth nightly., Disp: , Rfl:   •  metFORMIN (GLUCOPHAGE) 1000 MG tablet, Take 1,000 mg by mouth 2 (Two) Times a Day With Meals., Disp: , Rfl:   •  metoprolol  "succinate XL (TOPROL-XL) 50 MG 24 hr tablet, Take 1 tablet by mouth Daily., Disp: 30 tablet, Rfl: 11  •  Multiple Vitamins-Minerals (MULTIVITAL-M PO), Take 1 tablet by mouth Daily., Disp: , Rfl:   •  pantoprazole (PROTONIX) 40 MG EC tablet, Take 1 tablet by mouth Daily., Disp: 90 tablet, Rfl: 3  •  potassium chloride (MICRO-K) 10 MEQ CR capsule, Take 2 capsules by mouth Daily., Disp: 14 capsule, Rfl: 0  •  SITagliptin (JANUVIA) 100 MG tablet, Take 100 mg by mouth Daily., Disp: , Rfl:   •  SYMBICORT 160-4.5 MCG/ACT inhaler, Inhale 2 puffs 2 (Two) Times a Day., Disp: , Rfl:   •  ACCU-CHEK FASTCLIX LANCETS misc, USE TO CHECK BLOOD SUGAR TWICE DAILY AND AS NEEDED, Disp: 200 each, Rfl: 3  •  ACCU-CHEK SMARTVIEW test strip, USE AS DIRECTED 2 TIMES A DAY, Disp: 200 each, Rfl: 3  •  montelukast (SINGULAIR) 10 MG tablet, Take 10 mg by mouth Every Night., Disp: , Rfl:         Review of Systems   Constitutional: Negative for appetite change, fatigue and fever.   Eyes: Negative for visual disturbance.   Respiratory: Negative for shortness of breath.    Cardiovascular: Negative for palpitations and leg swelling.   Gastrointestinal: Negative for abdominal pain and vomiting.   Endocrine: Negative for polydipsia and polyuria.   Musculoskeletal: Negative for joint swelling and neck pain.   Skin: Negative for rash.   Neurological: Negative for weakness and numbness.   Psychiatric/Behavioral: Negative for behavioral problems.       Objective       Vitals:    08/30/19 1122   BP: 110/66   Pulse: 81   SpO2: 96%   Weight: (!) 149 kg (329 lb)   Height: 181.6 cm (71.5\")     Body mass index is 45.25 kg/m².      Physical Exam   Constitutional: He is oriented to person, place, and time. He appears well-nourished.   obese   HENT:   Head: Normocephalic and atraumatic.   Wide neck   Eyes: Conjunctivae and EOM are normal.   Neck: Normal range of motion. Neck supple. Carotid bruit is not present. No thyromegaly present.   Acanthosis nigricans "   Cardiovascular: Normal rate and normal heart sounds.   Pulmonary/Chest: Effort normal and breath sounds normal. No stridor. No respiratory distress.   Abdominal: Soft. Bowel sounds are normal. He exhibits no distension. There is no tenderness.   Central obesity   Musculoskeletal: He exhibits no edema or tenderness.   Neurological: He is alert and oriented to person, place, and time.   Skin: Skin is warm and dry.   Psychiatric: He has a normal mood and affect. His behavior is normal.   Vitals reviewed.    Results Review:     I reviewed the patient's new clinical results and mentioned them above in HPI and in plan as well.    Medical records reviewed  Summary:DONE      Lab on 08/16/2019   Component Date Value Ref Range Status   • 25 Hydroxy, Vitamin D 08/16/2019 39.5  30.0 - 100.0 ng/ml Final    Comment: Reference Range for Total Vitamin D 25(OH)  Deficiency <20.0 ng/mL  Insufficiency 21-29 ng/mL  Sufficiency  ng/mL  Toxicity >100 ng/ml     • Vitamin B-12 08/16/2019 344  211 - 946 pg/mL Final   • Folate 08/16/2019 >20.00  4.78 - 24.20 ng/mL Final   • Free T4 08/16/2019 1.39  0.93 - 1.70 ng/dL Final   • TSH 08/16/2019 2.490  0.270 - 4.200 mIU/mL Final   • Creatinine, Urine 08/16/2019 60.2  Not Estab. mg/dL Final   • Microalbumin, Urine 08/16/2019 4.0  Not Estab. ug/mL Final   • Microalbumin/Creatinine Ratio 08/16/2019 6.6  0.0 - 30.0 mg/g creat Final    Comment:                      Normal:                0.0 -  30.0                       Albuminuria:          31.0 - 300.0                       Clinical albuminuria:       >300.0     • Total Cholesterol 08/16/2019 100  0 - 200 mg/dL Final   • Triglycerides 08/16/2019 113  0 - 150 mg/dL Final   • HDL Cholesterol 08/16/2019 30* 40 - 60 mg/dL Final   • VLDL Cholesterol 08/16/2019 22.6  mg/dL Final   • LDL Cholesterol  08/16/2019 47  0 - 100 mg/dL Final   • Glucose 08/16/2019 98  65 - 99 mg/dL Final   • BUN 08/16/2019 20  6 - 20 mg/dL Final   • Creatinine 08/16/2019  1.14  0.76 - 1.27 mg/dL Final   • eGFR Non  Am 08/16/2019 67  >60 mL/min/1.73 Final   • eGFR African Am 08/16/2019 82  >60 mL/min/1.73 Final   • BUN/Creatinine Ratio 08/16/2019 17.5  7.0 - 25.0 Final   • Sodium 08/16/2019 140  136 - 145 mmol/L Final   • Potassium 08/16/2019 4.5  3.5 - 5.2 mmol/L Final   • Chloride 08/16/2019 98  98 - 107 mmol/L Final   • Total CO2 08/16/2019 28.3  22.0 - 29.0 mmol/L Final   • Calcium 08/16/2019 9.8  8.6 - 10.5 mg/dL Final   • Hemoglobin A1C 08/16/2019 6.10* 4.80 - 5.60 % Final    Comment: Hemoglobin A1C Ranges:  Increased Risk for Diabetes  5.7% to 6.4%  Diabetes                     >= 6.5%  Diabetic Goal                < 7.0%     • Interpretation 08/16/2019 Note   Final    Supplemental report is available.   • PDF Image 08/16/2019 Not applicable   Final     Lab Results   Component Value Date    HGBA1C 6.10 (H) 08/16/2019    HGBA1C 7.00 (H) 04/21/2019    HGBA1C 6.92 (H) 11/05/2018     Lab Results   Component Value Date    MICROALBUR 4.0 08/16/2019    CREATININE 1.14 08/16/2019     Imaging Results (most recent)     None                Assessment and Plan:    Lauri was seen today for diabetes.    Diagnoses and all orders for this visit:    Type 2 diabetes mellitus without complication, without long-term current use of insulin (CMS/[facility])  -     Hemoglobin A1c; Future  -     Basic Metabolic Panel; Future  -     Lipid Panel; Future  -     Microalbumin / Creatinine Urine Ratio - Urine, Clean Catch; Future  -     TSH; Future  -     T4, Free; Future    Morbid obesity (CMS/[facility])  -     Hemoglobin A1c; Future  -     Basic Metabolic Panel; Future  -     Lipid Panel; Future  -     Microalbumin / Creatinine Urine Ratio - Urine, Clean Catch; Future  -     TSH; Future  -     T4, Free; Future    Hyperlipidemia, unspecified hyperlipidemia type  -     Hemoglobin A1c; Future  -     Basic Metabolic Panel; Future  -     Lipid Panel; Future  -     Microalbumin / Creatinine Urine Ratio - Urine, Clean  "Catch; Future  -     TSH; Future  -     T4, Free; Future        Type 2 diabetes mellitus-uncontrolled  HbA1c is decently controlled  Continue the current oral hypoglycemic agents    Hyperlipidemia  Continue Lipitor    Morbid obesity  Worse  Emphasized to the patient about considering bariatric surgery switch the patient is not interested at this time  He is currently following strict exercise and diet control to help lose some weight.    Reviewed Lab results with the patient.             Rakesh Weir MD  08/30/19    EM[facility] Dragon / transcription disclaimer:     \"Dictated utilizing Dragon dictation\".  "

## 2019-09-17 RX ORDER — BLOOD SUGAR DIAGNOSTIC
STRIP MISCELLANEOUS
Qty: 200 EACH | Refills: 11 | Status: SHIPPED | OUTPATIENT
Start: 2019-09-17 | End: 2020-10-21

## 2019-09-17 RX ORDER — LANCETS
EACH MISCELLANEOUS
Qty: 200 EACH | Refills: 11 | Status: SHIPPED | OUTPATIENT
Start: 2019-09-17 | End: 2020-10-21

## 2019-12-10 ENCOUNTER — HOSPITAL ENCOUNTER (OUTPATIENT)
Dept: CARDIOLOGY | Facility: HOSPITAL | Age: 54
Discharge: HOME | End: 2019-12-10
Admitting: INTERNAL MEDICINE

## 2019-12-10 VITALS
OXYGEN SATURATION: 96 % | BODY MASS INDEX: 44.1 KG/M2 | HEIGHT: 71 IN | DIASTOLIC BLOOD PRESSURE: 70 MMHG | WEIGHT: 315 LBS | SYSTOLIC BLOOD PRESSURE: 118 MMHG | HEART RATE: 105 BPM

## 2019-12-10 DIAGNOSIS — I25.5 ISCHEMIC CARDIOMYOPATHY: ICD-10-CM

## 2019-12-10 LAB
AORTIC ARCH: 2.9 CM
AORTIC ROOT ANNULUS: 2.4 CM
BH CV ECHO MEAS - ACS: 2.3 CM
BH CV ECHO MEAS - AO MAX PG (FULL): 1.5 MMHG
BH CV ECHO MEAS - AO MAX PG: 3.2 MMHG
BH CV ECHO MEAS - AO MEAN PG (FULL): 0.84 MMHG
BH CV ECHO MEAS - AO MEAN PG: 1.8 MMHG
BH CV ECHO MEAS - AO ROOT AREA (BSA CORRECTED): 1.4
BH CV ECHO MEAS - AO ROOT AREA: 9.9 CM^2
BH CV ECHO MEAS - AO ROOT DIAM: 3.5 CM
BH CV ECHO MEAS - AO V2 MAX: 90.1 CM/SEC
BH CV ECHO MEAS - AO V2 MEAN: 61.6 CM/SEC
BH CV ECHO MEAS - AO V2 VTI: 16.6 CM
BH CV ECHO MEAS - AVA(I,A): 3.2 CM^2
BH CV ECHO MEAS - AVA(I,D): 3.2 CM^2
BH CV ECHO MEAS - AVA(V,A): 2.7 CM^2
BH CV ECHO MEAS - AVA(V,D): 2.7 CM^2
BH CV ECHO MEAS - BSA(HAYCOCK): 2.8 M^2
BH CV ECHO MEAS - BSA: 2.6 M^2
BH CV ECHO MEAS - BZI_BMI: 45.9 KILOGRAMS/M^2
BH CV ECHO MEAS - BZI_METRIC_HEIGHT: 180.3 CM
BH CV ECHO MEAS - BZI_METRIC_WEIGHT: 149.2 KG
BH CV ECHO MEAS - EDV(MOD-SP2): 167 ML
BH CV ECHO MEAS - EDV(MOD-SP4): 158 ML
BH CV ECHO MEAS - EDV(TEICH): 118.4 ML
BH CV ECHO MEAS - EF(CUBED): 47 %
BH CV ECHO MEAS - EF(MOD-BP): 40 %
BH CV ECHO MEAS - EF(MOD-SP2): 38.9 %
BH CV ECHO MEAS - EF(MOD-SP4): 39.9 %
BH CV ECHO MEAS - EF(TEICH): 39.1 %
BH CV ECHO MEAS - ESV(MOD-SP2): 102 ML
BH CV ECHO MEAS - ESV(MOD-SP4): 95 ML
BH CV ECHO MEAS - ESV(TEICH): 72.1 ML
BH CV ECHO MEAS - FS: 19.1 %
BH CV ECHO MEAS - IVS/LVPW: 1
BH CV ECHO MEAS - IVSD: 1.2 CM
BH CV ECHO MEAS - LAT PEAK E' VEL: 13 CM/SEC
BH CV ECHO MEAS - LV DIASTOLIC VOL/BSA (35-75): 60.6 ML/M^2
BH CV ECHO MEAS - LV MASS(C)D: 223 GRAMS
BH CV ECHO MEAS - LV MASS(C)DI: 85.6 GRAMS/M^2
BH CV ECHO MEAS - LV MAX PG: 1.7 MMHG
BH CV ECHO MEAS - LV MEAN PG: 0.94 MMHG
BH CV ECHO MEAS - LV SYSTOLIC VOL/BSA (12-30): 36.5 ML/M^2
BH CV ECHO MEAS - LV V1 MAX: 65.6 CM/SEC
BH CV ECHO MEAS - LV V1 MEAN: 45.2 CM/SEC
BH CV ECHO MEAS - LV V1 VTI: 14.6 CM
BH CV ECHO MEAS - LVIDD: 5 CM
BH CV ECHO MEAS - LVIDS: 4 CM
BH CV ECHO MEAS - LVLD AP2: 7.9 CM
BH CV ECHO MEAS - LVLD AP4: 7.7 CM
BH CV ECHO MEAS - LVLS AP2: 7 CM
BH CV ECHO MEAS - LVLS AP4: 6.8 CM
BH CV ECHO MEAS - LVOT AREA (M): 3.8 CM^2
BH CV ECHO MEAS - LVOT AREA: 3.7 CM^2
BH CV ECHO MEAS - LVOT DIAM: 2.2 CM
BH CV ECHO MEAS - LVPWD: 1.2 CM
BH CV ECHO MEAS - MED PEAK E' VEL: 8 CM/SEC
BH CV ECHO MEAS - MV A DUR: 0.11 SEC
BH CV ECHO MEAS - MV A MAX VEL: 78 CM/SEC
BH CV ECHO MEAS - MV DEC SLOPE: 854 CM/SEC^2
BH CV ECHO MEAS - MV DEC TIME: 0.16 SEC
BH CV ECHO MEAS - MV E MAX VEL: 94.3 CM/SEC
BH CV ECHO MEAS - MV E/A: 1.2
BH CV ECHO MEAS - MV MAX PG: 5 MMHG
BH CV ECHO MEAS - MV MEAN PG: 2.3 MMHG
BH CV ECHO MEAS - MV P1/2T MAX VEL: 119.4 CM/SEC
BH CV ECHO MEAS - MV P1/2T: 41 MSEC
BH CV ECHO MEAS - MV V2 MAX: 111.8 CM/SEC
BH CV ECHO MEAS - MV V2 MEAN: 71.6 CM/SEC
BH CV ECHO MEAS - MV V2 VTI: 25.1 CM
BH CV ECHO MEAS - MVA P1/2T LCG: 1.8 CM^2
BH CV ECHO MEAS - MVA(P1/2T): 5.4 CM^2
BH CV ECHO MEAS - MVA(VTI): 2.1 CM^2
BH CV ECHO MEAS - PA MAX PG (FULL): 1.9 MMHG
BH CV ECHO MEAS - PA MAX PG: 3.5 MMHG
BH CV ECHO MEAS - PA V2 MAX: 93.8 CM/SEC
BH CV ECHO MEAS - PULM A REVS DUR: 0.09 SEC
BH CV ECHO MEAS - PULM A REVS VEL: 23 CM/SEC
BH CV ECHO MEAS - PULM DIAS VEL: 67.8 CM/SEC
BH CV ECHO MEAS - PULM S/D: 0.97
BH CV ECHO MEAS - PULM SYS VEL: 65.4 CM/SEC
BH CV ECHO MEAS - PVA(V,A): 3.6 CM^2
BH CV ECHO MEAS - PVA(V,D): 3.6 CM^2
BH CV ECHO MEAS - QP/QS: 1.3
BH CV ECHO MEAS - RAP SYSTOLE: 3 MMHG
BH CV ECHO MEAS - RV MAX PG: 1.6 MMHG
BH CV ECHO MEAS - RV MEAN PG: 0.83 MMHG
BH CV ECHO MEAS - RV V1 MAX: 63 CM/SEC
BH CV ECHO MEAS - RV V1 MEAN: 42.3 CM/SEC
BH CV ECHO MEAS - RV V1 VTI: 12.8 CM
BH CV ECHO MEAS - RVOT AREA: 5.4 CM^2
BH CV ECHO MEAS - RVOT DIAM: 2.6 CM
BH CV ECHO MEAS - RVSP: 29 MMHG
BH CV ECHO MEAS - SI(AO): 62.9 ML/M^2
BH CV ECHO MEAS - SI(CUBED): 22.6 ML/M^2
BH CV ECHO MEAS - SI(LVOT): 20.5 ML/M^2
BH CV ECHO MEAS - SI(MOD-SP2): 24.9 ML/M^2
BH CV ECHO MEAS - SI(MOD-SP4): 24.2 ML/M^2
BH CV ECHO MEAS - SI(TEICH): 17.8 ML/M^2
BH CV ECHO MEAS - SUP REN AO DIAM: 2.4 CM
BH CV ECHO MEAS - SV(AO): 163.9 ML
BH CV ECHO MEAS - SV(CUBED): 58.8 ML
BH CV ECHO MEAS - SV(LVOT): 53.5 ML
BH CV ECHO MEAS - SV(MOD-SP2): 65 ML
BH CV ECHO MEAS - SV(MOD-SP4): 63 ML
BH CV ECHO MEAS - SV(RVOT): 69.4 ML
BH CV ECHO MEAS - SV(TEICH): 46.3 ML
BH CV ECHO MEAS - TAPSE (>1.6): 1.1 CM2
BH CV ECHO MEAS - TR MAX VEL: 253.8 CM/SEC
BH CV ECHO MEASUREMENTS AVERAGE E/E' RATIO: 8.98
BH CV XLRA - RV BASE: 4.5 CM
BH CV XLRA - RV LENGTH: 8.1 CM
BH CV XLRA - RV MID: 3.6 CM
BH CV XLRA - TDI S': 13 CM/SEC
LEFT ATRIUM VOLUME INDEX: 34 ML/M2
LEFT ATRIUM VOLUME: 89 CM3
SINUS: 4.1 CM
STJ: 3.6 CM

## 2019-12-10 PROCEDURE — 93306 TTE W/DOPPLER COMPLETE: CPT | Performed by: INTERNAL MEDICINE

## 2019-12-10 PROCEDURE — 93306 TTE W/DOPPLER COMPLETE: CPT

## 2019-12-10 PROCEDURE — 25010000002 PERFLUTREN (DEFINITY) 8.476 MG IN SODIUM CHLORIDE 0.9 % 10 ML INJECTION: Performed by: INTERNAL MEDICINE

## 2019-12-10 RX ADMIN — PERFLUTREN 1.5 ML: 6.52 INJECTION, SUSPENSION INTRAVENOUS at 07:37

## 2019-12-11 NOTE — PROGRESS NOTES
Called and spoke with him.  I reviewed the images.  Agree that the EF is about 45%.  The MR is at least moderate, possibly more.  I have recommended another echo in 6 months.  He was ok with this plan.    WENDI

## 2020-01-13 RX ORDER — SITAGLIPTIN 100 MG/1
TABLET, FILM COATED ORAL
Qty: 90 TABLET | Refills: 3 | Status: SHIPPED | OUTPATIENT
Start: 2020-01-13 | End: 2020-03-02

## 2020-02-17 ENCOUNTER — LAB (OUTPATIENT)
Dept: ENDOCRINOLOGY | Age: 55
End: 2020-02-17

## 2020-02-17 DIAGNOSIS — E66.01 MORBID OBESITY (HCC): ICD-10-CM

## 2020-02-17 DIAGNOSIS — E11.9 TYPE 2 DIABETES MELLITUS WITHOUT COMPLICATION, WITHOUT LONG-TERM CURRENT USE OF INSULIN (HCC): ICD-10-CM

## 2020-02-17 DIAGNOSIS — E78.5 HYPERLIPIDEMIA, UNSPECIFIED HYPERLIPIDEMIA TYPE: ICD-10-CM

## 2020-02-18 ENCOUNTER — OFFICE VISIT (OUTPATIENT)
Dept: CARDIOLOGY | Facility: CLINIC | Age: 55
End: 2020-02-18

## 2020-02-18 VITALS
HEART RATE: 83 BPM | WEIGHT: 315 LBS | DIASTOLIC BLOOD PRESSURE: 70 MMHG | SYSTOLIC BLOOD PRESSURE: 116 MMHG | OXYGEN SATURATION: 98 % | BODY MASS INDEX: 44.1 KG/M2 | HEIGHT: 71 IN

## 2020-02-18 DIAGNOSIS — I10 ESSENTIAL HYPERTENSION: ICD-10-CM

## 2020-02-18 DIAGNOSIS — E78.1 HYPERTRIGLYCERIDEMIA: ICD-10-CM

## 2020-02-18 DIAGNOSIS — E11.9 TYPE 2 DIABETES MELLITUS WITHOUT COMPLICATION, WITHOUT LONG-TERM CURRENT USE OF INSULIN (HCC): ICD-10-CM

## 2020-02-18 DIAGNOSIS — I25.10 CORONARY ARTERY DISEASE INVOLVING NATIVE CORONARY ARTERY OF NATIVE HEART WITHOUT ANGINA PECTORIS: Primary | ICD-10-CM

## 2020-02-18 LAB
ALBUMIN/CREAT UR: 6 MG/G CREAT (ref 0–29)
BUN SERPL-MCNC: 23 MG/DL (ref 6–20)
BUN/CREAT SERPL: 23.7 (ref 7–25)
CALCIUM SERPL-MCNC: 9.9 MG/DL (ref 8.6–10.5)
CHLORIDE SERPL-SCNC: 98 MMOL/L (ref 98–107)
CHOLEST SERPL-MCNC: 91 MG/DL (ref 0–200)
CO2 SERPL-SCNC: 27.7 MMOL/L (ref 22–29)
CREAT SERPL-MCNC: 0.97 MG/DL (ref 0.76–1.27)
CREAT UR-MCNC: 61.7 MG/DL
GLUCOSE SERPL-MCNC: 116 MG/DL (ref 65–99)
HBA1C MFR BLD: 6.4 % (ref 4.8–5.6)
HDLC SERPL-MCNC: 31 MG/DL (ref 40–60)
INTERPRETATION: NORMAL
LDLC SERPL CALC-MCNC: 39 MG/DL (ref 0–100)
Lab: NORMAL
MICROALBUMIN UR-MCNC: 3.8 UG/ML
POTASSIUM SERPL-SCNC: 4.6 MMOL/L (ref 3.5–5.2)
SODIUM SERPL-SCNC: 138 MMOL/L (ref 136–145)
T4 FREE SERPL-MCNC: 1.37 NG/DL (ref 0.93–1.7)
TRIGL SERPL-MCNC: 106 MG/DL (ref 0–150)
TSH SERPL DL<=0.005 MIU/L-ACNC: 1.83 UIU/ML (ref 0.27–4.2)
VLDLC SERPL CALC-MCNC: 21.2 MG/DL

## 2020-02-18 PROCEDURE — 99214 OFFICE O/P EST MOD 30 MIN: CPT | Performed by: NURSE PRACTITIONER

## 2020-02-18 NOTE — PROGRESS NOTES
[facility]      Patient Name: Lauri Garcia  :1965  Age: 54 y.o.  Primary Cardiologist: Jose Samuel MD  Encounter Provider:  АНДРЕЙ Haines      Chief Complaint:   Chief Complaint   Patient presents with   • Follow-up     6 month         HPI  Lauri Garcia is a 54 y.o. male who presents today for semiannual evaluation.     Pt has a  history significant for diabetes, asthma, obesity, coronary artery disease status post CABG x4.    Patient reports that he has done very well over the past 6 months.  Since his bypass surgery he has made several lifestyle changes.  He has made changes to his dietary habits.  He has now completed cardiac rehab and is continuing to workout at the gym 3 times weekly as well as walking daily.  He does not have any exertional symptoms.  He reports that since exercising and losing approximately 20 pounds his energy level has improved as well as his breathing.  States that he is actually now able to wean off of some of his inhalers that were originally prescribed for what was thought to be asthma.  He is currently asymptomatic and denies any chest pain, shortness of breath, dyspnea with exertion, palpitations, lightheadedness, lower extremity edema, fatigue.      The following portions of the patient's history were reviewed and updated as appropriate: allergies, current medications, past family history, past medical history, past social history, past surgical history and problem list.        Review of Systems   Constitution: Negative for malaise/fatigue.   Cardiovascular: Negative for chest pain and leg swelling.   Respiratory: Negative for shortness of breath.    Neurological: Negative for light-headedness.   All other systems reviewed and are negative.      OBJECTIVE:   Vital Signs  Vitals:    20 0957   BP: 116/70   Pulse: 83   SpO2: 98%     Estimated body mass index is 45.05 kg/m² as calculated from the following:    Height as of this  "encounter: 180.3 cm (71\").    Weight as of this encounter: 147 kg (323 lb).    Physical Exam   Constitutional: He is oriented to person, place, and time. Vital signs are normal. He appears well-developed and well-nourished.   Eyes: Conjunctivae are normal.   Neck: Carotid bruit is not present.   Cardiovascular: Normal rate, regular rhythm and normal heart sounds.   No murmur heard.  Pulmonary/Chest: Effort normal and breath sounds normal.   Abdominal: Normal appearance.   Musculoskeletal: Normal range of motion.   No pedal edema   Neurological: He is alert and oriented to person, place, and time. GCS eye subscore is 4. GCS verbal subscore is 5. GCS motor subscore is 6.   Skin: Skin is warm and dry.        Psychiatric: He has a normal mood and affect. His speech is normal and behavior is normal. Judgment and thought content normal. Cognition and memory are normal.       Procedures    Cardiac Procedures:  1. Echocardiogram 4/21/2019.  Left ventricular cavity is borderline dilated.  Severe hypokinesis of the inferior septum.  EF 45%.  No evidence of pericardial effusion.  2. Cardiac catheterization 4/21/2019. normal left main, LAD with significant multiple 90% stenosis in the midportion extending to the diagonal branch, circumflex was codominant with 90% stenosis in the midportion and a 90% stenosis of the distal portion, RCA had subtotal occlusion in the midportion.  EF estimated at 45%.  Recommended that patient be considered for CABG.  3. CABG times 4 on 4/24/19. LIMA to mid LAD, saphenous vein to diagonal 2, saphenous vein to distal circumflex, saphenous vein to PDA.  4. Echocardiogram 12/10/2019.  EF 40%.  Normal LV cavity size.  Global LV wall motion appears abnormal.  LV wall thickness consistent with mild to moderate LVH.  Diastolic function indeterminate.  RV cavity is dilated.  LAE.  Moderate mitral valve regurgitation.  Calculated RVSP 29 mmHg.          ASSESSMENT:      Diagnosis Plan   1. Coronary artery " disease involving native coronary artery of native heart without angina pectoris     2. Essential hypertension     3. Hypertriglyceridemia     4. Type 2 diabetes mellitus without complication, without long-term current use of insulin (CMS/[facility])           PLAN OF CARE:     1. Coronary artery disease status post CABG x4.  Patient status post CABG times 4 on 4/24/19.  Patient doing very well.  He currently denies any angina or dyspnea.  He is exercising 3 times weekly at the gym as well as walking on a daily basis.  Has made appropriate lifestyle changes and has lost 20 pounds.  He will continue GDMT with aspirin, Lipitor, metoprolol succinate.  2. Hypertension.  Patient's blood pressure very well controlled at 116/70.  Continue current regimen.  3. Hypertriglyceridemia.  Continue with dietary changes.  Continue statin therapy.  4. Type 2 diabetes.    5. Follow-up with Dr. Samuel in 6 months.  Sooner for any problems or complications.      Coronary Artery Disease  Assessment  • The patient has no angina    Plan  • Lifestyle modifications discussed include adhering to a heart healthy diet, avoidance of tobacco products, maintenance of a healthy weight, medication compliance, regular exercise and regular monitoring of cholesterol and blood pressure    Subjective - Objective  • There is a history of previous coronary artery bypass graft  on or around 4/24/2019 LIMA to mid LAD, saphenous vein to diagonal 2, saphenous vein to distal circumflex, saphenous vein to PDA.  • Current antiplatelet therapy includes aspirin 81 mg  • The patient qualifies for cardiac rehabilitation, and has been referred to cardiac rehab           Your medication list           Accurate as of February 18, 2020 10:37 AM. If you have any questions, ask your nurse or doctor.               CONTINUE taking these medications      Instructions Last Dose Given Next Dose Due   ACCU-CHEK FASTCLIX LANCETS misc      USE TO CHECK BLOOD SUGAR TWICE DAILY AND AS  NEEDED       ACCU-CHEK SMARTVIEW test strip  Generic drug:  glucose blood      USE AS DIRECTED 2 TIMES A DAY       albuterol sulfate  (90 Base) MCG/ACT inhaler  Commonly known as:  PROVENTIL HFA;VENTOLIN HFA;PROAIR HFA      Inhale 2 puffs Every 4 (Four) Hours As Needed for Wheezing.       allopurinol 300 MG tablet  Commonly known as:  ZYLOPRIM      Take 300 mg by mouth Every Evening.       aspirin 81 MG tablet      Take 1 tablet by mouth Daily.       atorvastatin 20 MG tablet  Commonly known as:  LIPITOR      Take 1 tablet by mouth Every Night.       cetirizine 10 MG tablet  Commonly known as:  zyrTEC      Take 10 mg by mouth Daily.       COLLAGEN PLUS VITAMIN C PO      Take 1 tablet by mouth Daily.       Empagliflozin 25 MG tablet  Commonly known as:  JARDIANCE      Take 25 mg by mouth Daily.       fluticasone 50 MCG/ACT nasal spray  Commonly known as:  FLONASE      2 sprays into the nostril(s) as directed by provider Daily As Needed for Rhinitis.       glimepiride 2 MG tablet  Commonly known as:  AMARYL      Take 1 tablet by mouth Every Morning.       JANUVIA 100 MG tablet  Generic drug:  SITagliptin      TAKE ONE TABLET BY MOUTH ONCE DAILY       KLOR-CON/EF 25 MEQ disintegrating tablet  Generic drug:  potassium bicarbonate      Take 50 mEq by mouth Daily.       lisinopril-hydrochlorothiazide 20-12.5 MG per tablet  Commonly known as:  ZESTORETIC      Take 2 tablets by mouth Daily.       melatonin 5 MG tablet tablet      Take 5 mg by mouth nightly.       metFORMIN 1000 MG tablet  Commonly known as:  GLUCOPHAGE      TAKE ONE TABLET BY MOUTH 2 TIMES A DAY WITH MEALS       metoprolol succinate XL 50 MG 24 hr tablet  Commonly known as:  TOPROL-XL      Take 1 tablet by mouth Daily.       montelukast 10 MG tablet  Commonly known as:  SINGULAIR      Take 10 mg by mouth Every Night.       MULTIVITAL-M PO      Take 1 tablet by mouth Daily.       pantoprazole 40 MG EC tablet  Commonly known as:  PROTONIX      Take 1  tablet by mouth Daily.       potassium chloride 10 MEQ CR capsule  Commonly known as:  MICRO-K      Take 2 capsules by mouth Daily.       SYMBICORT 160-4.5 MCG/ACT inhaler  Generic drug:  budesonide-formoterol      Inhale 2 puffs 2 (Two) Times a Day.                Thank you for allowing me to participate in the care of your patient,      Sincerely,   АНДРЕЙ Haines  [facility]  02/18/20      **Tanya Disclaimer:**  Much of this encounter note is an electronic transcription/translation of spoken language to printed text. The electronic translation of spoken language may permit erroneous, or at times, nonsensical words or phrases to be inadvertently transcribed. Although I have reviewed the note for such errors, some may still exist.

## 2020-03-02 ENCOUNTER — OFFICE VISIT (OUTPATIENT)
Dept: ENDOCRINOLOGY | Age: 55
End: 2020-03-02

## 2020-03-02 VITALS
HEART RATE: 102 BPM | BODY MASS INDEX: 44.1 KG/M2 | SYSTOLIC BLOOD PRESSURE: 128 MMHG | HEIGHT: 71 IN | DIASTOLIC BLOOD PRESSURE: 86 MMHG | OXYGEN SATURATION: 98 % | WEIGHT: 315 LBS

## 2020-03-02 DIAGNOSIS — E66.01 MORBID OBESITY (HCC): ICD-10-CM

## 2020-03-02 DIAGNOSIS — E78.2 HYPERLIPEMIA, MIXED: ICD-10-CM

## 2020-03-02 DIAGNOSIS — E11.9 TYPE 2 DIABETES MELLITUS WITHOUT COMPLICATION, WITHOUT LONG-TERM CURRENT USE OF INSULIN (HCC): Primary | ICD-10-CM

## 2020-03-02 PROCEDURE — 99214 OFFICE O/P EST MOD 30 MIN: CPT | Performed by: INTERNAL MEDICINE

## 2020-03-02 NOTE — PROGRESS NOTES
54 y.o.    Patient Care Team:  Lauri Xie MD as PCP - General (Internal Medicine)    Chief Complaint:    Follow up/ type 2 diabetes mellitus  Subjective     HPI    54-year-old white male is here as a follow-up for the management of type 2 diabetes mellitus.  Patient was seen by me when he was hospitalized in April 2019 for CABG..     Type 2 diabetes mellitus-diagnosed about 7 - 10 years ago.  Today in clinic patient reports that he is on Januvia 100 mg oral daily, Jardiance 25 mg oral daily.  Metformin thousand milligrams twice daily.  Glimepiride 2 mg oral daily with breakfast.  Checks BG 2 times a day.  Average blood sugars are around 110-120.  No significant high or low blood sugars.  Due for his eye examination, no prior history of diabetic retinopathy.  Has been having some tingling and numbness in his right thigh but no tingling or numbness in his bilateral feet.  No ulcers or amputations of his toes.  Status post CABG, no history of CKD or CVA.  On lisinopril     Hyperlipidemia  On Lipitor 20 mg oral daily.    Reviewed primary care physician's/consulting physician documentation and lab results :     Interval History      The following portions of the patient's history were reviewed and updated as appropriate: allergies, current medications, past family history, past medical history, past social history, past surgical history and problem list.    Past Medical History:   Diagnosis Date   • Allergic rhinitis     controlled on meds, followed by allergist (Dr. Kuo)   • Asthma     controlled on meds, followed by allergist (Dr. Kuo)   • Coronary artery disease    • Diabetes mellitus, type II (CMS/[facility])    • Hypertension    • Ischemic cardiomyopathy    • Kidney stone     on allopurinol and klor-con (Dr. Peterson)   • Sinus tachycardia    • Sleep apnea     on cpap, followed by sleep medicine     Family History   Problem Relation Age of Onset   • Diabetes Mother    • Hypertension Mother    • Coronary artery  disease Father         Father with CAD in his 60's      Social History     Socioeconomic History   • Marital status:      Spouse name: Not on file   • Number of children: Not on file   • Years of education: Not on file   • Highest education level: Not on file   Occupational History   • Occupation: Hospital contractor    Tobacco Use   • Smoking status: Never Smoker   • Smokeless tobacco: Never Used   Substance and Sexual Activity   • Alcohol use: No   • Drug use: No   • Sexual activity: Defer     No Known Allergies    Current Outpatient Medications:   •  ACCU-CHEK FASTCLIX LANCETS misc, USE TO CHECK BLOOD SUGAR TWICE DAILY AND AS NEEDED, Disp: 200 each, Rfl: 11  •  ACCU-CHEK SMARTVIEW test strip, USE AS DIRECTED 2 TIMES A DAY, Disp: 200 each, Rfl: 11  •  albuterol sulfate  (90 Base) MCG/ACT inhaler, Inhale 2 puffs Every 4 (Four) Hours As Needed for Wheezing., Disp: , Rfl:   •  allopurinol (ZYLOPRIM) 300 MG tablet, Take 300 mg by mouth Every Evening., Disp: , Rfl:   •  aspirin 81 MG tablet, Take 1 tablet by mouth Daily., Disp: 30 tablet, Rfl: 11  •  atorvastatin (LIPITOR) 20 MG tablet, Take 1 tablet by mouth Every Night., Disp: 90 tablet, Rfl: 3  •  cetirizine (ZyrTEC) 10 MG tablet, Take 10 mg by mouth Daily., Disp: , Rfl:   •  Collagen-Vitamin C (COLLAGEN PLUS VITAMIN C PO), Take 1 tablet by mouth Daily., Disp: , Rfl:   •  Empagliflozin (JARDIANCE) 25 MG tablet, Take 25 mg by mouth Daily., Disp: 30 tablet, Rfl: 11  •  fluticasone (FLONASE) 50 MCG/ACT nasal spray, 2 sprays into the nostril(s) as directed by provider Daily As Needed for Rhinitis., Disp: , Rfl:   •  glimepiride (AMARYL) 2 MG tablet, Take 1 tablet by mouth Every Morning., Disp: 30 tablet, Rfl: 11  •  lisinopril-hydrochlorothiazide (ZESTORETIC) 20-12.5 MG per tablet, Take 2 tablets by mouth Daily., Disp: 90 tablet, Rfl: 3  •  melatonin 5 MG tablet tablet, Take 5 mg by mouth nightly., Disp: , Rfl:   •  metoprolol succinate XL  "(TOPROL-XL) 50 MG 24 hr tablet, Take 1 tablet by mouth Daily., Disp: 30 tablet, Rfl: 11  •  montelukast (SINGULAIR) 10 MG tablet, Take 10 mg by mouth Every Night., Disp: , Rfl:   •  Multiple Vitamins-Minerals (MULTIVITAL-M PO), Take 1 tablet by mouth Daily., Disp: , Rfl:   •  pantoprazole (PROTONIX) 40 MG EC tablet, Take 1 tablet by mouth Daily., Disp: 90 tablet, Rfl: 3  •  potassium chloride (MICRO-K) 10 MEQ CR capsule, Take 2 capsules by mouth Daily., Disp: 14 capsule, Rfl: 0  •  SYMBICORT 160-4.5 MCG/ACT inhaler, Inhale 2 puffs 2 (Two) Times a Day., Disp: , Rfl:   •  KLOR-CON/EF 25 MEQ disintegrating tablet, Take 50 mEq by mouth Daily., Disp: , Rfl: 4  •  sitaGLIPtin-metFORMIN (JANUMET)  MG per tablet, Take 1 tablet by mouth 2 (Two) Times a Day With Meals., Disp: 180 tablet, Rfl: 3        Review of Systems   Constitutional: Negative for appetite change, fatigue and fever.   Eyes: Negative for visual disturbance.   Respiratory: Negative for shortness of breath.    Cardiovascular: Negative for palpitations and leg swelling.   Gastrointestinal: Negative for abdominal pain and vomiting.   Endocrine: Negative for polydipsia and polyuria.   Musculoskeletal: Negative for joint swelling and neck pain.   Skin: Negative for rash.   Neurological: Negative for weakness and numbness.   Psychiatric/Behavioral: Negative for behavioral problems.     I have reviewed the ROS as documented by the MA; Rakesh Weir MD.      Objective       Vitals:    03/02/20 1115   BP: 128/86   Pulse: 102   SpO2: 98%   Weight: (!) 144 kg (318 lb)   Height: 180.3 cm (71\")     Body mass index is 44.35 kg/m².      Physical Exam   Constitutional: He is oriented to person, place, and time. He appears well-nourished.   obese   HENT:   Head: Normocephalic and atraumatic.   Wide neck   Eyes: Conjunctivae and EOM are normal.   Neck: Normal range of motion. Neck supple. Carotid bruit is not present. No thyromegaly present.   Acanthosis nigricans "   Cardiovascular: Normal rate and normal heart sounds.   Pulmonary/Chest: Effort normal and breath sounds normal. No stridor. No respiratory distress.   Abdominal: Soft. Bowel sounds are normal. He exhibits no distension. There is no tenderness.   Central obesity   Musculoskeletal: He exhibits no edema or tenderness.   Neurological: He is alert and oriented to person, place, and time.   Skin: Skin is warm and dry.   Psychiatric: He has a normal mood and affect. His behavior is normal.   Vitals reviewed.    Results Review:     I reviewed the patient's new clinical results and mentioned them above in HPI and in plan as well.    Medical records reviewed  Summary:Done      Lab on 02/17/2020   Component Date Value Ref Range Status   • Free T4 02/17/2020 1.37  0.93 - 1.70 ng/dL Final    Results may be falsely increased if patient taking Biotin.   • TSH 02/17/2020 1.830  0.270 - 4.200 uIU/mL Final   • Creatinine, Urine 02/17/2020 61.7  Not Estab. mg/dL Final   • Microalbumin, Urine 02/17/2020 3.8  Not Estab. ug/mL Final   • Microalbumin/Creatinine Ratio 02/17/2020 6  0 - 29 mg/g creat Final    Comment:                        Normal:                0 -  29                         Moderately increased: 30 - 300                         Severely increased:       >300                **Please note reference interval change**     • Total Cholesterol 02/17/2020 91  0 - 200 mg/dL Final   • Triglycerides 02/17/2020 106  0 - 150 mg/dL Final   • HDL Cholesterol 02/17/2020 31* 40 - 60 mg/dL Final   • VLDL Cholesterol 02/17/2020 21.2  mg/dL Final   • LDL Cholesterol  02/17/2020 39  0 - 100 mg/dL Final   • Glucose 02/17/2020 116* 65 - 99 mg/dL Final   • BUN 02/17/2020 23* 6 - 20 mg/dL Final   • Creatinine 02/17/2020 0.97  0.76 - 1.27 mg/dL Final   • eGFR Non African Am 02/17/2020 81  >60 mL/min/1.73 Final   • eGFR African Am 02/17/2020 99  >60 mL/min/1.73 Final   • BUN/Creatinine Ratio 02/17/2020 23.7  7.0 - 25.0 Final   • Sodium  02/17/2020 138  136 - 145 mmol/L Final   • Potassium 02/17/2020 4.6  3.5 - 5.2 mmol/L Final   • Chloride 02/17/2020 98  98 - 107 mmol/L Final   • Total CO2 02/17/2020 27.7  22.0 - 29.0 mmol/L Final   • Calcium 02/17/2020 9.9  8.6 - 10.5 mg/dL Final   • Hemoglobin A1C 02/17/2020 6.40* 4.80 - 5.60 % Final    Comment: Hemoglobin A1C Ranges:  Increased Risk for Diabetes  5.7% to 6.4%  Diabetes                     >= 6.5%  Diabetic Goal                < 7.0%     • Interpretation 02/17/2020 Note   Final    Supplemental report is available.   • PDF Image 02/17/2020 Not applicable   Final     Lab Results   Component Value Date    HGBA1C 6.40 (H) 02/17/2020    HGBA1C 6.10 (H) 08/16/2019    HGBA1C 7.00 (H) 04/21/2019     Lab Results   Component Value Date    MICROALBUR 3.8 02/17/2020    CREATININE 0.97 02/17/2020     Imaging Results (Most Recent)     None                Assessment and Plan:    Lauri was seen today for diabetes.    Diagnoses and all orders for this visit:    Type 2 diabetes mellitus without complication, without long-term current use of insulin (CMS/[facility])  -     Hemoglobin A1c; Future  -     Basic Metabolic Panel; Future  -     Lipid Panel; Future  -     Microalbumin / Creatinine Urine Ratio - Urine, Clean Catch; Future  -     TSH; Future  -     T4, Free; Future    Morbid obesity (CMS/[facility])  -     Hemoglobin A1c; Future  -     Basic Metabolic Panel; Future  -     Lipid Panel; Future  -     Microalbumin / Creatinine Urine Ratio - Urine, Clean Catch; Future  -     TSH; Future  -     T4, Free; Future    Hyperlipemia, mixed  -     Hemoglobin A1c; Future  -     Basic Metabolic Panel; Future  -     Lipid Panel; Future  -     Microalbumin / Creatinine Urine Ratio - Urine, Clean Catch; Future  -     TSH; Future  -     T4, Free; Future    Other orders  -     sitaGLIPtin-metFORMIN (JANUMET)  MG per tablet; Take 1 tablet by mouth 2 (Two) Times a Day With Meals.      Type 2 diabetes mellitus-uncontrolled  Continue  "the current medications  For patient's convenience we will try to change Januvia and metformin to Janumet.    Morbid obesity  Discussed calorie counting - limit to 1500 khalida per day and exercising 2-3 times a week.    Hyperlipidemia  Continue Lipitor    Reviewed Lab results with the patient.             Rakesh Weir MD  03/02/20    EMR Dragon / transcription disclaimer:     \"Dictated utilizing Dragon dictation\".  "

## 2020-03-19 RX ORDER — ATORVASTATIN CALCIUM 20 MG/1
TABLET, FILM COATED ORAL
Qty: 90 TABLET | Refills: 1 | Status: SHIPPED | OUTPATIENT
Start: 2020-03-19 | End: 2020-08-18

## 2020-03-20 RX ORDER — GLIMEPIRIDE 2 MG/1
TABLET ORAL
Qty: 90 TABLET | Refills: 2 | Status: SHIPPED | OUTPATIENT
Start: 2020-03-20 | End: 2020-10-21

## 2020-03-20 RX ORDER — EMPAGLIFLOZIN 25 MG/1
TABLET, FILM COATED ORAL
Qty: 90 TABLET | Refills: 2 | Status: SHIPPED | OUTPATIENT
Start: 2020-03-20 | End: 2020-10-21

## 2020-05-27 RX ORDER — METOPROLOL SUCCINATE 50 MG/1
TABLET, EXTENDED RELEASE ORAL
Qty: 90 TABLET | Refills: 2 | Status: SHIPPED | OUTPATIENT
Start: 2020-05-27 | End: 2020-12-16

## 2020-06-24 ENCOUNTER — TELEPHONE (OUTPATIENT)
Dept: CARDIOLOGY | Facility: CLINIC | Age: 55
End: 2020-06-24

## 2020-06-24 NOTE — TELEPHONE ENCOUNTER
I verbally addressed with Kaylan. Pt is coming in for an EKG tomorrow morning to see what rhythm he is in and decide for there on what needs to be done.   Pt is aware  And is coming in tomorrow at 9:30 am.   Dion PEREZ

## 2020-06-24 NOTE — TELEPHONE ENCOUNTER
06/24/2020@ 3:45PM   Pt called left a VM stating for 3 day now his resting hr is running 120's-130's.   He has been walking daily and hr goes up 170's when walking per his apple watch.   I called him back spoke with him.   He feels it stress related due to his wife in hospital and is in serious shape.   He is not having cp, soa, swelling or any other symptoms.   He states his rest hr right now is 122.   Please advise  Pt's call back # [phone]   Thanks Dion PEREZ

## 2020-06-25 ENCOUNTER — CLINICAL SUPPORT (OUTPATIENT)
Dept: CARDIOLOGY | Facility: CLINIC | Age: 55
End: 2020-06-25

## 2020-06-25 VITALS — DIASTOLIC BLOOD PRESSURE: 76 MMHG | HEART RATE: 92 BPM | SYSTOLIC BLOOD PRESSURE: 124 MMHG

## 2020-06-25 DIAGNOSIS — I49.8 VENTRICULAR TRIGEMINY: Primary | ICD-10-CM

## 2020-06-25 DIAGNOSIS — R00.0 TACHYCARDIA: Primary | ICD-10-CM

## 2020-06-25 PROCEDURE — 93000 ELECTROCARDIOGRAM COMPLETE: CPT | Performed by: NURSE PRACTITIONER

## 2020-07-07 ENCOUNTER — HOSPITAL ENCOUNTER (OUTPATIENT)
Dept: CARDIOLOGY | Facility: HOSPITAL | Age: 55
Discharge: HOME | End: 2020-07-07
Admitting: NURSE PRACTITIONER

## 2020-07-07 VITALS
HEIGHT: 71 IN | BODY MASS INDEX: 44.1 KG/M2 | DIASTOLIC BLOOD PRESSURE: 70 MMHG | SYSTOLIC BLOOD PRESSURE: 116 MMHG | WEIGHT: 315 LBS

## 2020-07-07 DIAGNOSIS — I49.8 VENTRICULAR TRIGEMINY: ICD-10-CM

## 2020-07-07 LAB
AORTIC ARCH: 2.7 CM
ASCENDING AORTA: 3.7 CM
BH CV ECHO MEAS - ACS: 2.2 CM
BH CV ECHO MEAS - AO MAX PG (FULL): 2.6 MMHG
BH CV ECHO MEAS - AO MAX PG: 5.8 MMHG
BH CV ECHO MEAS - AO MEAN PG (FULL): 0.82 MMHG
BH CV ECHO MEAS - AO MEAN PG: 2.9 MMHG
BH CV ECHO MEAS - AO V2 MAX: 120.5 CM/SEC
BH CV ECHO MEAS - AO V2 MEAN: 77.1 CM/SEC
BH CV ECHO MEAS - AO V2 VTI: 25 CM
BH CV ECHO MEAS - ASC AORTA: 3.7 CM
BH CV ECHO MEAS - AVA(I,A): 3.4 CM^2
BH CV ECHO MEAS - AVA(I,D): 3.4 CM^2
BH CV ECHO MEAS - AVA(V,A): 3 CM^2
BH CV ECHO MEAS - AVA(V,D): 3 CM^2
BH CV ECHO MEAS - BSA(HAYCOCK): 2.8 M^2
BH CV ECHO MEAS - BSA: 2.6 M^2
BH CV ECHO MEAS - BZI_BMI: 44.4 KILOGRAMS/M^2
BH CV ECHO MEAS - BZI_METRIC_HEIGHT: 180.3 CM
BH CV ECHO MEAS - BZI_METRIC_WEIGHT: 144.2 KG
BH CV ECHO MEAS - EDV(MOD-SP2): 164 ML
BH CV ECHO MEAS - EDV(MOD-SP4): 162 ML
BH CV ECHO MEAS - EDV(TEICH): 179.6 ML
BH CV ECHO MEAS - EF(CUBED): 44.7 %
BH CV ECHO MEAS - EF(MOD-BP): 37.4 %
BH CV ECHO MEAS - EF(MOD-SP2): 36.6 %
BH CV ECHO MEAS - EF(MOD-SP4): 37.7 %
BH CV ECHO MEAS - EF(TEICH): 36.6 %
BH CV ECHO MEAS - ESV(MOD-SP2): 104 ML
BH CV ECHO MEAS - ESV(MOD-SP4): 101 ML
BH CV ECHO MEAS - ESV(TEICH): 113.8 ML
BH CV ECHO MEAS - FS: 17.9 %
BH CV ECHO MEAS - IVS/LVPW: 0.94
BH CV ECHO MEAS - IVSD: 1.4 CM
BH CV ECHO MEAS - LAT PEAK E' VEL: 13.3 CM/SEC
BH CV ECHO MEAS - LV DIASTOLIC VOL/BSA (35-75): 63.1 ML/M^2
BH CV ECHO MEAS - LV MASS(C)D: 386.7 GRAMS
BH CV ECHO MEAS - LV MASS(C)DI: 150.6 GRAMS/M^2
BH CV ECHO MEAS - LV MAX PG: 3.2 MMHG
BH CV ECHO MEAS - LV MEAN PG: 2.1 MMHG
BH CV ECHO MEAS - LV SYSTOLIC VOL/BSA (12-30): 39.3 ML/M^2
BH CV ECHO MEAS - LV V1 MAX: 89.5 CM/SEC
BH CV ECHO MEAS - LV V1 MEAN: 68.4 CM/SEC
BH CV ECHO MEAS - LV V1 VTI: 20.9 CM
BH CV ECHO MEAS - LVIDD: 6 CM
BH CV ECHO MEAS - LVIDS: 4.9 CM
BH CV ECHO MEAS - LVLD AP2: 9.6 CM
BH CV ECHO MEAS - LVLD AP4: 9.6 CM
BH CV ECHO MEAS - LVLS AP2: 8.5 CM
BH CV ECHO MEAS - LVLS AP4: 8.6 CM
BH CV ECHO MEAS - LVOT AREA (M): 4.2 CM^2
BH CV ECHO MEAS - LVOT AREA: 4.1 CM^2
BH CV ECHO MEAS - LVOT DIAM: 2.3 CM
BH CV ECHO MEAS - LVPWD: 1.4 CM
BH CV ECHO MEAS - MED PEAK E' VEL: 8.2 CM/SEC
BH CV ECHO MEAS - MR MAX PG: 51.1 MMHG
BH CV ECHO MEAS - MR MAX VEL: 357.4 CM/SEC
BH CV ECHO MEAS - MV A DUR: 0.12 SEC
BH CV ECHO MEAS - MV A MAX VEL: 66 CM/SEC
BH CV ECHO MEAS - MV DEC SLOPE: 457.6 CM/SEC^2
BH CV ECHO MEAS - MV DEC TIME: 0.2 SEC
BH CV ECHO MEAS - MV E MAX VEL: 81.8 CM/SEC
BH CV ECHO MEAS - MV E/A: 1.2
BH CV ECHO MEAS - MV MAX PG: 3.2 MMHG
BH CV ECHO MEAS - MV MEAN PG: 2.1 MMHG
BH CV ECHO MEAS - MV P1/2T MAX VEL: 80.6 CM/SEC
BH CV ECHO MEAS - MV P1/2T: 51.6 MSEC
BH CV ECHO MEAS - MV V2 MAX: 89.6 CM/SEC
BH CV ECHO MEAS - MV V2 MEAN: 69.8 CM/SEC
BH CV ECHO MEAS - MV V2 VTI: 23.1 CM
BH CV ECHO MEAS - MVA P1/2T LCG: 2.7 CM^2
BH CV ECHO MEAS - MVA(P1/2T): 4.3 CM^2
BH CV ECHO MEAS - MVA(VTI): 3.7 CM^2
BH CV ECHO MEAS - PA MAX PG (FULL): 1.7 MMHG
BH CV ECHO MEAS - PA MAX PG: 3.7 MMHG
BH CV ECHO MEAS - PA V2 MAX: 96.4 CM/SEC
BH CV ECHO MEAS - PULM A REVS DUR: 0.12 SEC
BH CV ECHO MEAS - PULM A REVS VEL: 33.1 CM/SEC
BH CV ECHO MEAS - PULM DIAS VEL: 38.4 CM/SEC
BH CV ECHO MEAS - PULM S/D: 1.3
BH CV ECHO MEAS - PULM SYS VEL: 48.2 CM/SEC
BH CV ECHO MEAS - PVA(V,A): 2.4 CM^2
BH CV ECHO MEAS - PVA(V,D): 2.4 CM^2
BH CV ECHO MEAS - QP/QS: 0.56
BH CV ECHO MEAS - RV BASE (<4.1) - OBSOLETE: 4 CM
BH CV ECHO MEAS - RV LENGTH (<8.5) - OBSOLETE: 8 CM
BH CV ECHO MEAS - RV MAX PG: 2 MMHG
BH CV ECHO MEAS - RV MEAN PG: 1.1 MMHG
BH CV ECHO MEAS - RV V1 MAX: 71.6 CM/SEC
BH CV ECHO MEAS - RV V1 MEAN: 47.7 CM/SEC
BH CV ECHO MEAS - RV V1 VTI: 14.6 CM
BH CV ECHO MEAS - RVOT AREA: 3.3 CM^2
BH CV ECHO MEAS - RVOT DIAM: 2 CM
BH CV ECHO MEAS - SI(CUBED): 37.5 ML/M^2
BH CV ECHO MEAS - SI(LVOT): 33.1 ML/M^2
BH CV ECHO MEAS - SI(MOD-SP2): 23.4 ML/M^2
BH CV ECHO MEAS - SI(MOD-SP4): 23.8 ML/M^2
BH CV ECHO MEAS - SI(TEICH): 25.6 ML/M^2
BH CV ECHO MEAS - SV(CUBED): 96.3 ML
BH CV ECHO MEAS - SV(LVOT): 85.1 ML
BH CV ECHO MEAS - SV(MOD-SP2): 60 ML
BH CV ECHO MEAS - SV(MOD-SP4): 61 ML
BH CV ECHO MEAS - SV(RVOT): 47.8 ML
BH CV ECHO MEAS - SV(TEICH): 65.7 ML
BH CV ECHO MEAS - TAPSE (>1.6): 2.1 CM2
BH CV ECHO MEASUREMENTS AVERAGE E/E' RATIO: 7.61
BH CV XLRA - RV BASE: 3.9 CM
BH CV XLRA - RV LENGTH: 8 CM
BH CV XLRA - RV MID: 3.2 CM
BH CV XLRA - TDI S': 12 CM/SEC
LEFT ATRIUM VOLUME INDEX: 24 ML/M2
LV EF 2D ECHO EST: 37 %
MAXIMAL PREDICTED HEART RATE: 167 BPM
SINUS: 4.1 CM
STJ: 3.5 CM
STRESS TARGET HR: 142 BPM

## 2020-07-07 PROCEDURE — 93306 TTE W/DOPPLER COMPLETE: CPT

## 2020-07-07 PROCEDURE — 93306 TTE W/DOPPLER COMPLETE: CPT | Performed by: INTERNAL MEDICINE

## 2020-07-07 PROCEDURE — 25010000002 PERFLUTREN (DEFINITY) 8.476 MG IN SODIUM CHLORIDE 0.9 % 10 ML INJECTION: Performed by: NURSE PRACTITIONER

## 2020-07-07 RX ADMIN — PERFLUTREN 2 ML: 6.52 INJECTION, SUSPENSION INTRAVENOUS at 14:50

## 2020-07-08 NOTE — PROGRESS NOTES
Patient was advised of echocardiogram results.  Echocardiogram is stable compared to echocardiogram in December 2019.  Patient states that his heart rate has improved.  He thinks that the episode he experienced was stress related.  He states that his father recently passed away and his wife has been diagnosed with an inoperable cancer.  Reports that for himself he is feeling well.  He would like to reschedule his upcoming appointment with Dr. Samuel for a few months out since he is feeling well and has had this evaluation as his wife has multiple physician appointments of her own.

## 2020-08-18 RX ORDER — ATORVASTATIN CALCIUM 20 MG/1
TABLET, FILM COATED ORAL
Qty: 90 TABLET | Refills: 1 | Status: SHIPPED | OUTPATIENT
Start: 2020-08-18 | End: 2020-12-21

## 2020-08-20 ENCOUNTER — LAB (OUTPATIENT)
Dept: ENDOCRINOLOGY | Age: 55
End: 2020-08-20

## 2020-08-20 DIAGNOSIS — E78.2 HYPERLIPEMIA, MIXED: ICD-10-CM

## 2020-08-20 DIAGNOSIS — E66.01 MORBID OBESITY (HCC): ICD-10-CM

## 2020-08-20 DIAGNOSIS — E11.9 TYPE 2 DIABETES MELLITUS WITHOUT COMPLICATION, WITHOUT LONG-TERM CURRENT USE OF INSULIN (HCC): ICD-10-CM

## 2020-08-21 LAB
ALBUMIN/CREAT UR: 69 MG/G CREAT (ref 0–29)
BUN SERPL-MCNC: 22 MG/DL (ref 6–20)
BUN/CREAT SERPL: 18.8 (ref 7–25)
CALCIUM SERPL-MCNC: 10.2 MG/DL (ref 8.6–10.5)
CHLORIDE SERPL-SCNC: 99 MMOL/L (ref 98–107)
CHOLEST SERPL-MCNC: 97 MG/DL (ref 0–200)
CO2 SERPL-SCNC: 30.1 MMOL/L (ref 22–29)
CREAT SERPL-MCNC: 1.17 MG/DL (ref 0.76–1.27)
CREAT UR-MCNC: 112.8 MG/DL
GLUCOSE SERPL-MCNC: 99 MG/DL (ref 65–99)
HBA1C MFR BLD: 6 % (ref 4.8–5.6)
HDLC SERPL-MCNC: 28 MG/DL (ref 40–60)
INTERPRETATION: NORMAL
LDLC SERPL CALC-MCNC: 42 MG/DL (ref 0–100)
Lab: NORMAL
MICROALBUMIN UR-MCNC: 77.7 UG/ML
POTASSIUM SERPL-SCNC: 4.5 MMOL/L (ref 3.5–5.2)
SODIUM SERPL-SCNC: 140 MMOL/L (ref 136–145)
T4 FREE SERPL-MCNC: 1.68 NG/DL (ref 0.93–1.7)
TRIGL SERPL-MCNC: 137 MG/DL (ref 0–150)
TSH SERPL DL<=0.005 MIU/L-ACNC: 1.66 UIU/ML (ref 0.27–4.2)
VLDLC SERPL CALC-MCNC: 27.4 MG/DL

## 2020-09-03 ENCOUNTER — OFFICE VISIT (OUTPATIENT)
Dept: ENDOCRINOLOGY | Age: 55
End: 2020-09-03

## 2020-09-03 VITALS
WEIGHT: 310 LBS | HEIGHT: 71 IN | OXYGEN SATURATION: 97 % | DIASTOLIC BLOOD PRESSURE: 70 MMHG | SYSTOLIC BLOOD PRESSURE: 118 MMHG | BODY MASS INDEX: 43.4 KG/M2 | HEART RATE: 88 BPM

## 2020-09-03 DIAGNOSIS — E78.2 HYPERLIPEMIA, MIXED: ICD-10-CM

## 2020-09-03 DIAGNOSIS — IMO0002 DM (DIABETES MELLITUS), TYPE 2, UNCONTROLLED W/NEUROLOGIC COMPLICATION: ICD-10-CM

## 2020-09-03 DIAGNOSIS — E66.01 MORBID OBESITY (HCC): Primary | ICD-10-CM

## 2020-09-03 PROCEDURE — 99214 OFFICE O/P EST MOD 30 MIN: CPT | Performed by: INTERNAL MEDICINE

## 2020-09-03 NOTE — PROGRESS NOTES
55 y.o.    Patient Care Team:  Lauri Xie MD as PCP - General (Internal Medicine)    Chief Complaint:    FOLLOW UP/ TYPE 2 DIABETES  MELLITUS   Subjective     HPI    55-year-old white male is here as a follow-up for the management of type 2 diabetes mellitus.  Patient was seen by me when he was hospitalized in April 2019 for CABG..     Type 2 diabetes mellitus-diagnosed about 7 - 10 years ago.  Today in clinic patient reports he is on Januvia 100 mg oral daily, Jardiance 25 mg oral daily, metformin thousand milligrams twice daily.  Also on glimepiride 2 mg oral daily with breakfast.  Continues to check his blood sugars 2 times a day.  Average blood sugars are around 110-140.  No prior history of diabetic retinopathy.  Does have some tingling and numbness in his bilateral feet which are more or less stable.  Status post CABG, no history of CKD or CVA.  On lisinopril     Hyperlipidemia  On Lipitor 20 mg oral daily.       Patient is under a lot of stress, he lost his father to covid.  His  wife is dealing with a cancerous condition, she recently underwent surgery and is being considered for chemotherapy    Reviewed primary care physician's/consulting physician documentation and lab results :     Interval History      The following portions of the patient's history were reviewed and updated as appropriate: allergies, current medications, past family history, past medical history, past social history, past surgical history and problem list.    Past Medical History:   Diagnosis Date   • Allergic rhinitis     controlled on meds, followed by allergist (Dr. Kuo)   • Asthma     controlled on meds, followed by allergist (Dr. Kuo)   • Coronary artery disease    • Diabetes mellitus, type II (CMS/HCC)    • Hypertension    • Ischemic cardiomyopathy    • Kidney stone     on allopurinol and klor-con (Dr. Peterson)   • Sinus tachycardia    • Sleep apnea     on cpap, followed by sleep medicine     Family History   Problem  Relation Age of Onset   • Diabetes Mother    • Hypertension Mother    • Coronary artery disease Father         Father with CAD in his 60's      Social History     Socioeconomic History   • Marital status:      Spouse name: Not on file   • Number of children: Not on file   • Years of education: Not on file   • Highest education level: Not on file   Occupational History   • Occupation: Hospital contractor    Tobacco Use   • Smoking status: Never Smoker   • Smokeless tobacco: Never Used   Substance and Sexual Activity   • Alcohol use: No   • Drug use: No   • Sexual activity: Defer     No Known Allergies    Current Outpatient Medications:   •  ACCU-CHEK FASTCLIX LANCETS misc, USE TO CHECK BLOOD SUGAR TWICE DAILY AND AS NEEDED, Disp: 200 each, Rfl: 11  •  ACCU-CHEK SMARTVIEW test strip, USE AS DIRECTED 2 TIMES A DAY, Disp: 200 each, Rfl: 11  •  allopurinol (ZYLOPRIM) 300 MG tablet, Take 300 mg by mouth Every Evening., Disp: , Rfl:   •  aspirin 81 MG tablet, Take 1 tablet by mouth Daily., Disp: 30 tablet, Rfl: 11  •  atorvastatin (LIPITOR) 20 MG tablet, TAKE ONE TABLET BY MOUTH EVERY NIGHT, Disp: 90 tablet, Rfl: 1  •  cetirizine (ZyrTEC) 10 MG tablet, Take 10 mg by mouth Daily., Disp: , Rfl:   •  fluticasone (FLONASE) 50 MCG/ACT nasal spray, 2 sprays into the nostril(s) as directed by provider Daily As Needed for Rhinitis., Disp: , Rfl:   •  glimepiride (AMARYL) 2 MG tablet, TAKE ONE TABLET BY MOUTH EVERY MORNING, Disp: 90 tablet, Rfl: 2  •  JARDIANCE 25 MG tablet, TAKE ONE TABLET BY MOUTH ONCE DAILY, Disp: 90 tablet, Rfl: 2  •  KLOR-CON/EF 25 MEQ disintegrating tablet, Take 50 mEq by mouth Daily., Disp: , Rfl: 4  •  lisinopril-hydrochlorothiazide (ZESTORETIC) 20-12.5 MG per tablet, Take 2 tablets by mouth Daily., Disp: 90 tablet, Rfl: 3  •  melatonin 5 MG tablet tablet, Take 5 mg by mouth nightly., Disp: , Rfl:   •  metoprolol succinate XL (TOPROL-XL) 50 MG 24 hr tablet, TAKE ONE TABLET BY MOUTH ONCE DAILY  "(Patient taking differently: Take 50 mg by mouth Every Night.), Disp: 90 tablet, Rfl: 2  •  montelukast (SINGULAIR) 10 MG tablet, Take 10 mg by mouth Every Night., Disp: , Rfl:   •  Multiple Vitamins-Minerals (MULTIVITAL-M PO), Take 1 tablet by mouth Daily., Disp: , Rfl:   •  potassium chloride (MICRO-K) 10 MEQ CR capsule, Take 2 capsules by mouth Daily., Disp: 14 capsule, Rfl: 0  •  sitaGLIPtin-metFORMIN (JANUMET)  MG per tablet, Take 1 tablet by mouth 2 (Two) Times a Day With Meals., Disp: 180 tablet, Rfl: 3  •  SYMBICORT 160-4.5 MCG/ACT inhaler, Inhale 2 puffs 2 (Two) Times a Day., Disp: , Rfl:         Review of Systems   Constitutional: Positive for fatigue. Negative for appetite change and fever.   Eyes: Negative for visual disturbance.   Respiratory: Negative for shortness of breath.    Cardiovascular: Negative for palpitations and leg swelling.   Gastrointestinal: Negative for abdominal pain and vomiting.   Endocrine: Negative for polydipsia and polyuria.   Musculoskeletal: Negative for joint swelling and neck pain.   Skin: Negative for rash.   Neurological: Negative for weakness and numbness.   Psychiatric/Behavioral: Positive for sleep disturbance. Negative for behavioral problems.     I have reviewed the ROS as documented by the MA; Rakesh Weir MD.      Objective       Vitals:    09/03/20 0952   BP: 118/70   Pulse: 88   SpO2: 97%   Weight: (!) 141 kg (310 lb)   Height: 180.3 cm (71\")     Body mass index is 43.24 kg/m².      Physical Exam   Constitutional: He is oriented to person, place, and time. He appears well-nourished.   obese   HENT:   Head: Normocephalic and atraumatic.   Wide neck   Eyes: Conjunctivae and EOM are normal.   Neck: Normal range of motion. Neck supple. Carotid bruit is not present. No thyromegaly present.   Acanthosis nigricans   Cardiovascular: Normal rate and normal heart sounds.   Pulmonary/Chest: Effort normal and breath sounds normal. No stridor. No respiratory " distress.   Abdominal: Soft. Bowel sounds are normal. He exhibits no distension. There is no tenderness.   Central obesity   Musculoskeletal: He exhibits no edema or tenderness.   Neurological: He is alert and oriented to person, place, and time.   Skin: Skin is warm and dry.   Psychiatric: He has a normal mood and affect. His behavior is normal.   Vitals reviewed.    Results Review:     I reviewed the patient's new clinical results and mentioned them above in HPI and in plan as well.    Medical records reviewed  Summary:Done      Lab on 08/20/2020   Component Date Value Ref Range Status   • Free T4 08/20/2020 1.68  0.93 - 1.70 ng/dL Final    Results may be falsely increased if patient taking Biotin.   • TSH 08/20/2020 1.660  0.270 - 4.200 uIU/mL Final   • Creatinine, Urine 08/20/2020 112.8  Not Estab. mg/dL Final   • Microalbumin, Urine 08/20/2020 77.7  Not Estab. ug/mL Final   • Microalbumin/Creatinine Ratio 08/20/2020 69* 0 - 29 mg/g creat Final    Comment:                        Normal:                0 -  29                         Moderately increased: 30 - 300                         Severely increased:       >300                **Please note reference interval change**     • Total Cholesterol 08/20/2020 97  0 - 200 mg/dL Final   • Triglycerides 08/20/2020 137  0 - 150 mg/dL Final   • HDL Cholesterol 08/20/2020 28* 40 - 60 mg/dL Final   • VLDL Cholesterol 08/20/2020 27.4  mg/dL Final   • LDL Cholesterol  08/20/2020 42  0 - 100 mg/dL Final   • Glucose 08/20/2020 99  65 - 99 mg/dL Final   • BUN 08/20/2020 22* 6 - 20 mg/dL Final   • Creatinine 08/20/2020 1.17  0.76 - 1.27 mg/dL Final   • eGFR Non  Am 08/20/2020 65  >60 mL/min/1.73 Final   • eGFR African Am 08/20/2020 79  >60 mL/min/1.73 Final   • BUN/Creatinine Ratio 08/20/2020 18.8  7.0 - 25.0 Final   • Sodium 08/20/2020 140  136 - 145 mmol/L Final   • Potassium 08/20/2020 4.5  3.5 - 5.2 mmol/L Final   • Chloride 08/20/2020 99  98 - 107 mmol/L Final   •  Total CO2 08/20/2020 30.1* 22.0 - 29.0 mmol/L Final   • Calcium 08/20/2020 10.2  8.6 - 10.5 mg/dL Final   • Hemoglobin A1C 08/20/2020 6.00* 4.80 - 5.60 % Final    Comment: Hemoglobin A1C Ranges:  Increased Risk for Diabetes  5.7% to 6.4%  Diabetes                     >= 6.5%  Diabetic Goal                < 7.0%     • Interpretation 08/20/2020 Note   Final    Supplemental report is available.   • PDF Image 08/20/2020 Not applicable   Final     Lab Results   Component Value Date    HGBA1C 6.00 (H) 08/20/2020    HGBA1C 6.40 (H) 02/17/2020    HGBA1C 6.10 (H) 08/16/2019     Lab Results   Component Value Date    MICROALBUR 77.7 08/20/2020    CREATININE 1.17 08/20/2020     Imaging Results (Most Recent)     None                Assessment and Plan:    Lauri was seen today for diabetes.    Diagnoses and all orders for this visit:    Morbid obesity (CMS/[facility])  -     TSH; Future  -     T4, Free; Future  -     Hemoglobin A1c; Future  -     Creatinine, Serum; Future  -     eGFR-Glomerular Filtration; Future  -     Lipid Panel; Future  -     Vitamin B12 & Folate; Future  -     Vitamin D 25 Hydroxy; Future  -     Microalbumin / Creatinine Urine Ratio - Urine, Clean Catch; Future    Hyperlipemia, mixed  -     TSH; Future  -     T4, Free; Future  -     Hemoglobin A1c; Future  -     Creatinine, Serum; Future  -     eGFR-Glomerular Filtration; Future  -     Lipid Panel; Future  -     Vitamin B12 & Folate; Future  -     Vitamin D 25 Hydroxy; Future  -     Microalbumin / Creatinine Urine Ratio - Urine, Clean Catch; Future    DM (diabetes mellitus), type 2, uncontrolled w/neurologic complication (CMS/[facility])  -     TSH; Future  -     T4, Free; Future  -     Hemoglobin A1c; Future  -     Creatinine, Serum; Future  -     eGFR-Glomerular Filtration; Future  -     Lipid Panel; Future  -     Vitamin B12 & Folate; Future  -     Vitamin D 25 Hydroxy; Future  -     Microalbumin / Creatinine Urine Ratio - Urine, Clean Catch; Future      Type 2  "diabetes mellitus-uncontrolled  Continue the current oral hypoglycemic agents    Hyperlipidemia  Continue Lipitor 20 mg oral daily    Morbid obesity  Discussed calorie counting - limit to 1500 khalida per day and exercising 2-3 times a week.      Reviewed Lab results with the patient.           Rakesh Weir MD  09/03/20    EMR Dragon / transcription disclaimer:     \"Dictated utilizing Dragon dictation\".      "

## 2020-10-13 ENCOUNTER — OFFICE VISIT (OUTPATIENT)
Dept: CARDIOLOGY | Facility: CLINIC | Age: 55
End: 2020-10-13

## 2020-10-13 VITALS
HEART RATE: 82 BPM | DIASTOLIC BLOOD PRESSURE: 62 MMHG | WEIGHT: 315 LBS | SYSTOLIC BLOOD PRESSURE: 118 MMHG | BODY MASS INDEX: 44.1 KG/M2 | OXYGEN SATURATION: 99 % | HEIGHT: 71 IN

## 2020-10-13 DIAGNOSIS — R00.0 SINUS TACHYCARDIA: ICD-10-CM

## 2020-10-13 DIAGNOSIS — I25.810 CORONARY ARTERY DISEASE INVOLVING CORONARY BYPASS GRAFT OF NATIVE HEART WITHOUT ANGINA PECTORIS: Primary | ICD-10-CM

## 2020-10-13 DIAGNOSIS — I25.5 ISCHEMIC CARDIOMYOPATHY: ICD-10-CM

## 2020-10-13 PROCEDURE — 99213 OFFICE O/P EST LOW 20 MIN: CPT | Performed by: INTERNAL MEDICINE

## 2020-10-14 NOTE — PROGRESS NOTES
Date of Office Visit:  10/13/2020  Encounter Provider: Anthony Samuel MD  Place of Service: [facility] CARDIOLOGY  Patient Name: Lauri Garcia  :1965    Chief complaint: Follow-up for coronary artery disease, ischemic cardiomyopathy,   frequent PVC's, and sinus tachycardia.    History of Present Illness:    I again had the pleasure of seeing the patient in cardiology office on 10/13/2020.  He is a   very pleasant 55 year-old white male with a history of coronary artery disease, ischemic   cardiomyopathy, diabetes, and sinus tachycardia who presents for follow-up.  Patient   presented on 2019 with progressively worsening dyspnea on exertion.  He had   been treated for presumptive asthma, although his shortness of breath was occurring   with minimal activity.  He was felt to have flash pulmonary edema upon arrival, and   required diuresis.  An echocardiogram obtained on 2019 showed inferior wall   motion abnormalities with an ejection fraction of 45%.  His troponin was positive, and   continued to increase.  He ultimately underwent a left heart catheterization on 2019   which showed severe three-vessel coronary artery disease.  Specifically, he had   multiple 90% lesions in the mid LAD, 90% lesions in the mid and distal left circumflex,   and a subtotally occluded mid RCA.  He underwent a four-vessel CABG on 2019   by Dr. Turner.  At that time, he had a LIMA to mid LAD, SVG to the second diagonal   branch, SVG to the distal left circumflex, and SVG to the PDA (the PDA was   codominant).  He did have generalized weakness and somewhat slow recovery   afterwards.  He also had some difficulty with sinus tachycardia afterwards,and this   was treated with metoprolol.  An echocardiogram on 2019 again confirmed severe   hypokinesis of the inferior wall and basal to mid inferolateral wall with an ejection   fraction of 40%.     The patient presents today for  follow-up.  He is doing fairly well.  He states that he had some serious personal issues recently back several months ago when his wife was diagnosed with cancer and his father .  However, now he feels much better.  He did have very frequent PVCs on a Holter monitor from 2020.  However, he feels much better in general.  He has not really felt any shortness of breath or chest discomfort in the last several months.    Past Medical History:   Diagnosis Date   • Allergic rhinitis     controlled on meds, followed by allergist (Dr. Kuo)   • Asthma     controlled on meds, followed by allergist (Dr. Kuo)   • Coronary artery disease    • Diabetes mellitus, type II (CMS/[facility])    • Hypertension    • Ischemic cardiomyopathy    • Kidney stone     on allopurinol and klor-con (Dr. Peterson)   • Sinus tachycardia    • Sleep apnea     on cpap, followed by sleep medicine       Past Surgical History:   Procedure Laterality Date   • CARDIAC CATHETERIZATION N/A 2019    Procedure: Left Heart Cath;  Surgeon: Tila Franco MD;  Location: [facility] CATH INVASIVE LOCATION;  Service: Cardiology   • CARDIAC CATHETERIZATION N/A 2019    Procedure: Coronary angiography;  Surgeon: Tila Franco MD;  Location: [facility] CATH INVASIVE LOCATION;  Service: Cardiology   • CARDIAC CATHETERIZATION N/A 2019    Procedure: Left ventriculography;  Surgeon: Tila Franco MD;  Location: [facility] CATH INVASIVE LOCATION;  Service: Cardiology   • CORONARY ARTERY BYPASS GRAFT N/A 2019    Procedure: INTRAOP GUILLERMINA; CORONARY ARTERY BYPASS X 4 WITH LEFT INTERNAL MAMMARY ARTERY GRAFT AND UTILIZING ENDOSCOPICALLY HARVESTED LEFT GREATER SAPHENOUS VEIN; PRP;  Surgeon: Sirisha Turner MD;  Location: [facility] OR;  Service: Cardiothoracic   • KIDNEY STONE SURGERY  2010    Kidney Stones Removed   • KNEE SURGERY Left     ACL       Current Outpatient Medications on File Prior to Visit   Medication Sig Dispense Refill    • ACCU-CHEK FASTCLIX LANCETS misc USE TO CHECK BLOOD SUGAR TWICE DAILY AND AS NEEDED 200 each 11   • ACCU-CHEK SMARTVIEW test strip USE AS DIRECTED 2 TIMES A  each 11   • allopurinol (ZYLOPRIM) 300 MG tablet Take 300 mg by mouth Every Evening.     • aspirin 81 MG tablet Take 1 tablet by mouth Daily. 30 tablet 11   • atorvastatin (LIPITOR) 20 MG tablet TAKE ONE TABLET BY MOUTH EVERY NIGHT 90 tablet 1   • cetirizine (ZyrTEC) 10 MG tablet Take 10 mg by mouth Daily.     • fluticasone (FLONASE) 50 MCG/ACT nasal spray 2 sprays into the nostril(s) as directed by provider Daily As Needed for Rhinitis.     • glimepiride (AMARYL) 2 MG tablet TAKE ONE TABLET BY MOUTH EVERY MORNING 90 tablet 2   • JARDIANCE 25 MG tablet TAKE ONE TABLET BY MOUTH ONCE DAILY 90 tablet 2   • KLOR-CON/EF 25 MEQ disintegrating tablet Take 50 mEq by mouth Daily.  4   • lisinopril-hydrochlorothiazide (ZESTORETIC) 20-12.5 MG per tablet Take 2 tablets by mouth Daily. 90 tablet 3   • melatonin 5 MG tablet tablet Take 5 mg by mouth nightly.     • metoprolol succinate XL (TOPROL-XL) 50 MG 24 hr tablet TAKE ONE TABLET BY MOUTH ONCE DAILY (Patient taking differently: Take 50 mg by mouth Every Night.) 90 tablet 2   • montelukast (SINGULAIR) 10 MG tablet Take 10 mg by mouth Every Night.     • Multiple Vitamins-Minerals (MULTIVITAL-M PO) Take 1 tablet by mouth Daily.     • potassium chloride (MICRO-K) 10 MEQ CR capsule Take 2 capsules by mouth Daily. 14 capsule 0   • sitaGLIPtin-metFORMIN (JANUMET)  MG per tablet Take 1 tablet by mouth 2 (Two) Times a Day With Meals. 180 tablet 3   • SYMBICORT 160-4.5 MCG/ACT inhaler Inhale 2 puffs 2 (Two) Times a Day.       No current facility-administered medications on file prior to visit.      Allergies as of 10/13/2020   • (No Known Allergies)     Social History     Socioeconomic History   • Marital status:      Spouse name: Not on file   • Number of children: Not on file   • Years of education: Not  "on file   • Highest education level: Not on file   Occupational History   • Occupation: Hospital contractor    Tobacco Use   • Smoking status: Never Smoker   • Smokeless tobacco: Never Used   Substance and Sexual Activity   • Alcohol use: No   • Drug use: No   • Sexual activity: Defer     Family History   Problem Relation Age of Onset   • Diabetes Mother    • Hypertension Mother    • Coronary artery disease Father         Father with CAD in his 60's        Review of Systems   All other systems reviewed and are negative.     Objective:     Vitals:    10/13/20 0827   BP: 118/62   Pulse: 82   SpO2: 99%   Weight: (!) 148 kg (325 lb 6.4 oz)   Height: 180.3 cm (70.98\")     Body mass index is 45.4 kg/m².    Physical Exam   Constitutional: He is oriented to person, place, and time. He appears well-developed and well-nourished.   HENT:   Head: Normocephalic and atraumatic.   Eyes: Conjunctivae are normal.   Neck: Neck supple.   Cardiovascular: Normal rate and regular rhythm. Exam reveals no gallop and no friction rub.   No murmur heard.  Pulmonary/Chest: Effort normal and breath sounds normal.   Abdominal: Soft. There is no abdominal tenderness.   Musculoskeletal:         General: No edema.   Neurological: He is alert and oriented to person, place, and time.   Skin: Skin is warm.   Psychiatric: He has a normal mood and affect. His behavior is normal.     Lab Review:   Procedures    Cardiac Procedures:  1.  Left heart catheterization on 4/21/2019: The LAD had multiple 90% lesions in the   midportion extending into the diagonal branch.  The left circumflex was codominant   with a mid 90% lesion and distal 90% lesion.  The RCA was subtotally occluded at   the mid level.  2.  Status post four-vessel CABG on 4/24/2019 by Dr. Turner.  At that time, he had a   LIMA to mid LAD, SVG to second diagonal, SVG to distal left circumflex, and SVG to   the PDA.  3.  Echocardiogram on 4/26/2019: There was severe hypokinesis of the " inferior wall   and basal to mid inferolateral wall.  The ejection fraction was 40%.  The right ventricle   was mildly dilated.  There was a small pericardial effusion.  4.  24-hour Holter monitor on 2020: There were very frequent PVCs with a total of   26,662 events (21.5% of the total).  There was occasional ventricular trigeminy.    There were rare ventricular couplets.  There was no ventricular tachycardia.  5.  Echocardiogram on 2020: The ejection fraction was calculated at 37%.  The   left ventricle was moderately enlarged.  There was moderate LVH.  There was no   significant valvular disease.  The aortic root was 4.1 cm.    Assessment:       Diagnosis Plan   1. Coronary artery disease involving coronary bypass graft of native heart without angina pectoris     2. Ischemic cardiomyopathy     3. Sinus tachycardia       Plan:       He is doing much better since adjusting to some of the stressors in his life.  His father  several months ago and his wife was diagnosed with cancer.  She is currently being treated.  He feels much better in general.      He did have a lipid panel on 2020 which showed a total cholesterol of 97, HDL 28, LDL 42, and triglycerides 137.  He is going to continue on the Lipitor 20 mg/day.  He will continue on aspirin for life.  His blood pressure is excellent at 118/62, and his heart rate is 82.  He is asymptomatic from the PVCs currently.  He will continue on the Zestoretic and Toprol-XL.  His last ejection fraction was calculated at 37%, although I felt it was closer to the 40 to 45% range on personal assessment.  He appears to be euvolemic and not in heart failure currently.    For now, I did not change any medications.  I will see him back in 6 months.

## 2020-10-16 ENCOUNTER — TELEPHONE (OUTPATIENT)
Dept: CARDIOLOGY | Facility: CLINIC | Age: 55
End: 2020-10-16

## 2020-10-16 RX ORDER — NITROGLYCERIN 0.4 MG/1
TABLET SUBLINGUAL
Qty: 10 TABLET | Refills: 2 | Status: SHIPPED | OUTPATIENT
Start: 2020-10-16 | End: 2021-05-14 | Stop reason: SDUPTHER

## 2020-10-16 NOTE — TELEPHONE ENCOUNTER
Patient called and stated that at his last office visit on 10/13/2020 he was told  would send in a new prescription for Nitroglycerin however the pharmacy has not yet received this. He is wanting to know the status of this. It does not look like this has been sent in yet.

## 2020-10-16 NOTE — TELEPHONE ENCOUNTER
This has now been sent in.  The order somehow was pended and did not go through.  Can you please let him know?  Thanks     WENDI

## 2020-10-21 RX ORDER — BLOOD SUGAR DIAGNOSTIC
STRIP MISCELLANEOUS
Qty: 200 EACH | Refills: 1 | Status: SHIPPED | OUTPATIENT
Start: 2020-10-21 | End: 2021-03-25

## 2020-10-21 RX ORDER — GLIMEPIRIDE 2 MG/1
TABLET ORAL
Qty: 90 TABLET | Refills: 1 | Status: SHIPPED | OUTPATIENT
Start: 2020-10-21 | End: 2021-03-25

## 2020-10-21 RX ORDER — LANCETS
EACH MISCELLANEOUS
Qty: 204 EACH | Refills: 1 | Status: SHIPPED | OUTPATIENT
Start: 2020-10-21 | End: 2021-03-25

## 2020-10-21 RX ORDER — EMPAGLIFLOZIN 25 MG/1
TABLET, FILM COATED ORAL
Qty: 90 TABLET | Refills: 1 | Status: SHIPPED | OUTPATIENT
Start: 2020-10-21 | End: 2021-03-25

## 2020-12-16 RX ORDER — METOPROLOL SUCCINATE 50 MG/1
TABLET, EXTENDED RELEASE ORAL
Qty: 90 TABLET | Refills: 3 | Status: SHIPPED | OUTPATIENT
Start: 2020-12-16 | End: 2020-12-22 | Stop reason: SDUPTHER

## 2020-12-17 RX ORDER — SITAGLIPTIN AND METFORMIN HYDROCHLORIDE 1000; 50 MG/1; MG/1
TABLET, FILM COATED ORAL
Qty: 180 TABLET | Refills: 11 | Status: SHIPPED | OUTPATIENT
Start: 2020-12-17 | End: 2021-09-17 | Stop reason: CLARIF

## 2020-12-21 RX ORDER — ATORVASTATIN CALCIUM 20 MG/1
TABLET, FILM COATED ORAL
Qty: 90 TABLET | Refills: 11 | Status: SHIPPED | OUTPATIENT
Start: 2020-12-21 | End: 2021-05-14 | Stop reason: SDUPTHER

## 2020-12-22 RX ORDER — METOPROLOL SUCCINATE 50 MG/1
50 TABLET, EXTENDED RELEASE ORAL DAILY
Qty: 90 TABLET | Refills: 3 | Status: SHIPPED | OUTPATIENT
Start: 2020-12-22 | End: 2021-05-14 | Stop reason: SDUPTHER

## 2021-03-25 ENCOUNTER — TELEPHONE (OUTPATIENT)
Dept: ENDOCRINOLOGY | Age: 56
End: 2021-03-25

## 2021-03-25 RX ORDER — LANCETS
EACH MISCELLANEOUS
Qty: 204 EACH | Refills: 11 | Status: SHIPPED | OUTPATIENT
Start: 2021-03-25 | End: 2021-06-22

## 2021-03-25 RX ORDER — GLIMEPIRIDE 2 MG/1
TABLET ORAL
Qty: 90 TABLET | Refills: 11 | Status: SHIPPED | OUTPATIENT
Start: 2021-03-25 | End: 2022-06-09

## 2021-03-25 RX ORDER — EMPAGLIFLOZIN 25 MG/1
TABLET, FILM COATED ORAL
Qty: 90 TABLET | Refills: 11 | Status: SHIPPED | OUTPATIENT
Start: 2021-03-25 | End: 2022-06-09

## 2021-03-25 RX ORDER — BLOOD SUGAR DIAGNOSTIC
STRIP MISCELLANEOUS
Qty: 200 EACH | Refills: 11 | Status: SHIPPED | OUTPATIENT
Start: 2021-03-25 | End: 2022-09-02

## 2021-04-09 DIAGNOSIS — E11.9 TYPE 2 DIABETES MELLITUS WITHOUT COMPLICATION, WITHOUT LONG-TERM CURRENT USE OF INSULIN (HCC): Primary | ICD-10-CM

## 2021-04-09 RX ORDER — GLUCOSAM/CHON-MSM1/C/MANG/BOSW 500-416.6
TABLET ORAL
Qty: 200 EACH | Refills: 1 | Status: SHIPPED | OUTPATIENT
Start: 2021-04-09 | End: 2021-12-23

## 2021-04-09 RX ORDER — CALCIUM CITRATE/VITAMIN D3 200MG-6.25
TABLET ORAL
Qty: 200 EACH | Refills: 1 | Status: SHIPPED | OUTPATIENT
Start: 2021-04-09 | End: 2021-12-23

## 2021-04-09 RX ORDER — BLOOD-GLUCOSE METER
1 EACH MISCELLANEOUS DAILY
Qty: 1 EACH | Refills: 0 | Status: SHIPPED | OUTPATIENT
Start: 2021-04-09

## 2021-05-14 ENCOUNTER — OFFICE VISIT (OUTPATIENT)
Dept: CARDIOLOGY | Facility: CLINIC | Age: 56
End: 2021-05-14

## 2021-05-14 VITALS
DIASTOLIC BLOOD PRESSURE: 78 MMHG | HEIGHT: 71 IN | SYSTOLIC BLOOD PRESSURE: 118 MMHG | WEIGHT: 315 LBS | HEART RATE: 76 BPM | BODY MASS INDEX: 44.1 KG/M2 | OXYGEN SATURATION: 100 %

## 2021-05-14 DIAGNOSIS — E66.01 CLASS 3 SEVERE OBESITY DUE TO EXCESS CALORIES WITH SERIOUS COMORBIDITY AND BODY MASS INDEX (BMI) OF 45.0 TO 49.9 IN ADULT (HCC): ICD-10-CM

## 2021-05-14 DIAGNOSIS — G47.33 OBSTRUCTIVE SLEEP APNEA SYNDROME: ICD-10-CM

## 2021-05-14 DIAGNOSIS — I10 ESSENTIAL HYPERTENSION: ICD-10-CM

## 2021-05-14 DIAGNOSIS — E11.9 TYPE 2 DIABETES MELLITUS WITHOUT COMPLICATION, WITHOUT LONG-TERM CURRENT USE OF INSULIN (HCC): ICD-10-CM

## 2021-05-14 DIAGNOSIS — I25.810 CORONARY ARTERY DISEASE INVOLVING CORONARY BYPASS GRAFT OF NATIVE HEART WITHOUT ANGINA PECTORIS: Primary | ICD-10-CM

## 2021-05-14 DIAGNOSIS — I25.5 ISCHEMIC CARDIOMYOPATHY: ICD-10-CM

## 2021-05-14 PROCEDURE — 99214 OFFICE O/P EST MOD 30 MIN: CPT | Performed by: NURSE PRACTITIONER

## 2021-05-14 RX ORDER — ATORVASTATIN CALCIUM 20 MG/1
20 TABLET, FILM COATED ORAL
Qty: 90 TABLET | Refills: 3 | Status: SHIPPED | OUTPATIENT
Start: 2021-05-14 | End: 2021-11-15 | Stop reason: SDUPTHER

## 2021-05-14 RX ORDER — METOPROLOL SUCCINATE 50 MG/1
50 TABLET, EXTENDED RELEASE ORAL DAILY
Qty: 90 TABLET | Refills: 3 | Status: SHIPPED | OUTPATIENT
Start: 2021-05-14 | End: 2021-11-15 | Stop reason: SDUPTHER

## 2021-05-14 RX ORDER — NITROGLYCERIN 0.4 MG/1
TABLET SUBLINGUAL
Qty: 10 TABLET | Refills: 2 | Status: SHIPPED | OUTPATIENT
Start: 2021-05-14 | End: 2022-06-07 | Stop reason: SDUPTHER

## 2021-06-22 RX ORDER — LANCETS
EACH MISCELLANEOUS
Qty: 204 EACH | Refills: 11 | Status: SHIPPED | OUTPATIENT
Start: 2021-06-22 | End: 2022-09-02

## 2021-08-20 ENCOUNTER — LAB (OUTPATIENT)
Dept: ENDOCRINOLOGY | Age: 56
End: 2021-08-20

## 2021-08-20 DIAGNOSIS — E78.2 HYPERLIPEMIA, MIXED: ICD-10-CM

## 2021-08-20 DIAGNOSIS — IMO0002 DM (DIABETES MELLITUS), TYPE 2, UNCONTROLLED W/NEUROLOGIC COMPLICATION: ICD-10-CM

## 2021-08-20 DIAGNOSIS — E66.01 MORBID OBESITY (HCC): ICD-10-CM

## 2021-08-21 LAB
25(OH)D3+25(OH)D2 SERPL-MCNC: 42.9 NG/ML (ref 30–100)
ALBUMIN/CREAT UR: 4 MG/G CREAT (ref 0–29)
CHOLEST SERPL-MCNC: 104 MG/DL (ref 100–199)
CREAT SERPL-MCNC: 1.1 MG/DL (ref 0.76–1.27)
CREAT UR-MCNC: 145 MG/DL
FOLATE SERPL-MCNC: 11.1 NG/ML
HBA1C MFR BLD: 6.5 % (ref 4.8–5.6)
HDLC SERPL-MCNC: 30 MG/DL
IMP & REVIEW OF LAB RESULTS: NORMAL
LDLC SERPL CALC-MCNC: 48 MG/DL (ref 0–99)
MICROALBUMIN UR-MCNC: 6 UG/ML
T4 FREE SERPL-MCNC: 1.25 NG/DL (ref 0.82–1.77)
TRIGL SERPL-MCNC: 151 MG/DL (ref 0–149)
TSH SERPL DL<=0.005 MIU/L-ACNC: 3.39 UIU/ML (ref 0.45–4.5)
VIT B12 SERPL-MCNC: 412 PG/ML (ref 232–1245)
VLDLC SERPL CALC-MCNC: 26 MG/DL (ref 5–40)

## 2021-09-03 ENCOUNTER — OFFICE VISIT (OUTPATIENT)
Dept: ENDOCRINOLOGY | Age: 56
End: 2021-09-03

## 2021-09-03 VITALS
DIASTOLIC BLOOD PRESSURE: 70 MMHG | SYSTOLIC BLOOD PRESSURE: 114 MMHG | HEART RATE: 91 BPM | HEIGHT: 72 IN | BODY MASS INDEX: 42.66 KG/M2 | OXYGEN SATURATION: 96 % | WEIGHT: 315 LBS

## 2021-09-03 DIAGNOSIS — E66.01 MORBID OBESITY (HCC): ICD-10-CM

## 2021-09-03 DIAGNOSIS — E78.2 HYPERLIPEMIA, MIXED: ICD-10-CM

## 2021-09-03 DIAGNOSIS — IMO0002 DM (DIABETES MELLITUS), TYPE 2, UNCONTROLLED W/NEUROLOGIC COMPLICATION: Primary | ICD-10-CM

## 2021-09-03 PROCEDURE — 99214 OFFICE O/P EST MOD 30 MIN: CPT | Performed by: INTERNAL MEDICINE

## 2021-09-03 NOTE — PROGRESS NOTES
"Chief Complaint  Chief Complaint   Patient presents with   • Diabetes     FOLLOW UP/ TYPE 2 DM  Subjective          History of Present Illness    Lauri Garcia 56 y.o. presents with Type 2 dm as a F/u patient.     Type 2 dm - Diagnosed about 10  years ago.   Today in clinic pt reports being on Janumet 50-thousand 1 tablet 2 times a day, Jardiance 25 mg oral daily, glimepiride 2 mg only with breakfast.  Average BG-100-120  Checks BG -2 times a day  Sensor -no   dm retinopathy -no history,Last eye exam -up-to-date with eye exam   dm nephropathy -no  Dm neuropathy -no complaints,Dm neuropathy meds -not on meds.  Did have some tingling and numbness in the past which has improved.  CAD -s/p CABG  CVA -no  Episodes of hypoglycemia -none  Pt is physically active. weight has been stable.   Pt tries to follow DM diet for most part.   On Ace inb.    Hyperlipidemia  On Lipitor 20 mg oral daily    Wife is still dealing with cancer, s/p chemotherapy and no plans immunotherapy    Reviewed primary care physician's/consulting physician documentation and lab results         I have reviewed the patient's allergies, medicines, past medical hx, family hx and social hx in detail.    Objective   Vital Signs:   /70   Pulse 91   Ht 181.6 cm (71.5\")   Wt (!) 152 kg (336 lb)   SpO2 96%   BMI 46.21 kg/m²   Physical Exam   General appearance - no distress  Eyes- anicteric sclera  Ear nose and throat-external ears and nose normal.    Respiratory-normal chest on inspection.  No respiratory distress noted.  Skin-no rashes.  Neuro-alert and oriented x3          Result Review :   The following data was reviewed by: Rakesh Weir MD on 09/03/2021:  Lab on 08/20/2021   Component Date Value Ref Range Status   • Creatinine, Urine 08/20/2021 145.0  Not Estab. mg/dL Final   • Microalbumin, Urine 08/20/2021 6.0  Not Estab. ug/mL Final   • Microalbumin/Creatinine Ratio 08/20/2021 4  0 - 29 mg/g creat Final    Comment:                        " Normal:                0 -  29                         Moderately increased: 30 - 300                         Severely increased:       >300     • 25 Hydroxy, Vitamin D 08/20/2021 42.9  30.0 - 100.0 ng/mL Final    Comment: Vitamin D deficiency has been defined by the [employer] of  Medicine and an Endocrine Society practice guideline as a  level of serum 25-OH vitamin D less than 20 ng/mL (1,2).  The Endocrine Society went on to further define vitamin D  insufficiency as a level between 21 and 29 ng/mL (2).  1. IOM ([employer] of Medicine). 2010. Dietary reference     intakes for calcium and D. [place]: The     National [employer].  2. Catrachito MF, Yesica NC, Tanvir CARDONA, et al.     Evaluation, treatment, and prevention of vitamin D     deficiency: an Endocrine Society clinical practice     guideline. JCEM. 2011 Jul; 96(7):1911-30.     • Vitamin B-12 08/20/2021 412  232 - 1,245 pg/mL Final   • Folate 08/20/2021 11.1  >3.0 ng/mL Final    Comment: A serum folate concentration of less than 3.1 ng/mL is  considered to represent clinical deficiency.     • Total Cholesterol 08/20/2021 104  100 - 199 mg/dL Final   • Triglycerides 08/20/2021 151* 0 - 149 mg/dL Final   • HDL Cholesterol 08/20/2021 30* >39 mg/dL Final   • VLDL Cholesterol Magnus 08/20/2021 26  5 - 40 mg/dL Final   • LDL Chol Calc (NIH) 08/20/2021 48  0 - 99 mg/dL Final   • Creatinine 08/20/2021 1.10  0.76 - 1.27 mg/dL Final   • eGFR Non  Am 08/20/2021 76  >59 mL/min/1.73 Final   • eGFR African Am 08/20/2021 88  >59 mL/min/1.73 Final    Comment: **[facility] currently reports eGFR in compliance with the current**    recommendations of the National Kidney Foundation. [facility] will    update reporting as new guidelines are published from the NKF-ASN    Task force.     • Hemoglobin A1C 08/20/2021 6.5* 4.8 - 5.6 % Final    Comment:          Prediabetes: 5.7 - 6.4           Diabetes: >6.4           Glycemic control for adults with diabetes: <7.0      • Free T4 08/20/2021 1.25  0.82 - 1.77 ng/dL Final   • TSH 08/20/2021 3.390  0.450 - 4.500 uIU/mL Final   • Interpretation 08/20/2021 Note   Final    Supplemental report is available.     Data reviewed: PCP documentation       Results Review:    I reviewed the patient's new clinical results.     Assessment and Plan    Problem List Items Addressed This Visit     None      Visit Diagnoses     DM (diabetes mellitus), type 2, uncontrolled w/neurologic complication (CMS/HCC)    -  Primary    Relevant Orders    TSH    T4, Free    T3, Free    Basic Metabolic Panel    Hemoglobin A1c    Lipid Panel    Vitamin D 25 Hydroxy    Basic Metabolic Panel    Hemoglobin A1c    Lipid Panel    Microalbumin / Creatinine Urine Ratio - Urine, Clean Catch    TSH    T4, Free    Morbid obesity (CMS/HCC)        Relevant Orders    TSH    T4, Free    T3, Free    Basic Metabolic Panel    Hemoglobin A1c    Lipid Panel    Vitamin D 25 Hydroxy    Basic Metabolic Panel    Hemoglobin A1c    Lipid Panel    Microalbumin / Creatinine Urine Ratio - Urine, Clean Catch    TSH    T4, Free    Hyperlipemia, mixed        Relevant Orders    TSH    T4, Free    T3, Free    Basic Metabolic Panel    Hemoglobin A1c    Lipid Panel    Vitamin D 25 Hydroxy    Basic Metabolic Panel    Hemoglobin A1c    Lipid Panel    Microalbumin / Creatinine Urine Ratio - Urine, Clean Catch    TSH    T4, Free        Type 2 diabetes mellitus-chronic problem  Continue the above oral hypoglycemic agents    Hyperlipidemia  Continue Lipitor 20 mg oral daily    Obesity-worse  Discussed about the calorie restrictions and exercise regimen.    Interpreted the blood work-up/imaging results performed by the primary care/consulting physician -    Refills sent to pharmacy    Follow Up     Patient was given instructions and counseling regarding her condition or for health maintenance advice. Please see specific information pulled into the AVS if appropriate.       Thank you for asking me to see  "your patient, Lauri Garcia in consultation.         Rakesh Weir MD  09/03/21      EM[facility] Dragon / transcription disclaimer:     \"Dictated utilizing Dragon dictation\".         "

## 2021-09-17 ENCOUNTER — TELEPHONE (OUTPATIENT)
Dept: ENDOCRINOLOGY | Age: 56
End: 2021-09-17

## 2021-09-17 DIAGNOSIS — E11.9 TYPE 2 DIABETES MELLITUS WITHOUT COMPLICATION, WITHOUT LONG-TERM CURRENT USE OF INSULIN (HCC): Primary | ICD-10-CM

## 2021-09-17 NOTE — TELEPHONE ENCOUNTER
janumet 50 - 1000  IR 1 x daily      Needs to be changed  To XR     Can this be done   If so please send new script     Patient is not out of med

## 2021-11-15 ENCOUNTER — OFFICE VISIT (OUTPATIENT)
Dept: CARDIOLOGY | Facility: CLINIC | Age: 56
End: 2021-11-15

## 2021-11-15 VITALS
WEIGHT: 315 LBS | DIASTOLIC BLOOD PRESSURE: 76 MMHG | BODY MASS INDEX: 42.66 KG/M2 | HEART RATE: 95 BPM | OXYGEN SATURATION: 98 % | HEIGHT: 72 IN | SYSTOLIC BLOOD PRESSURE: 104 MMHG

## 2021-11-15 DIAGNOSIS — I10 ESSENTIAL HYPERTENSION: ICD-10-CM

## 2021-11-15 DIAGNOSIS — G47.33 OBSTRUCTIVE SLEEP APNEA SYNDROME: ICD-10-CM

## 2021-11-15 DIAGNOSIS — I25.5 ISCHEMIC CARDIOMYOPATHY: ICD-10-CM

## 2021-11-15 DIAGNOSIS — I25.810 CORONARY ARTERY DISEASE INVOLVING CORONARY BYPASS GRAFT OF NATIVE HEART WITHOUT ANGINA PECTORIS: Primary | ICD-10-CM

## 2021-11-15 DIAGNOSIS — E11.9 TYPE 2 DIABETES MELLITUS WITHOUT COMPLICATION, WITHOUT LONG-TERM CURRENT USE OF INSULIN (HCC): ICD-10-CM

## 2021-11-15 DIAGNOSIS — E66.01 CLASS 3 SEVERE OBESITY DUE TO EXCESS CALORIES WITH SERIOUS COMORBIDITY AND BODY MASS INDEX (BMI) OF 45.0 TO 49.9 IN ADULT (HCC): ICD-10-CM

## 2021-11-15 PROCEDURE — 99214 OFFICE O/P EST MOD 30 MIN: CPT | Performed by: NURSE PRACTITIONER

## 2021-11-15 RX ORDER — METOPROLOL SUCCINATE 50 MG/1
50 TABLET, EXTENDED RELEASE ORAL DAILY
Qty: 90 TABLET | Refills: 3 | Status: SHIPPED | OUTPATIENT
Start: 2021-11-15 | End: 2022-06-07 | Stop reason: SDUPTHER

## 2021-11-15 RX ORDER — ATORVASTATIN CALCIUM 20 MG/1
20 TABLET, FILM COATED ORAL
Qty: 90 TABLET | Refills: 3 | Status: SHIPPED | OUTPATIENT
Start: 2021-11-15 | End: 2022-06-07 | Stop reason: SDUPTHER

## 2021-11-15 NOTE — PROGRESS NOTES
"    CARDIOLOGY        Patient Name: Lauri Garcia  :1965  Age: 56 y.o.  Primary Cardiologist: Jose Samuel MD  Encounter Provider:  АНДРЕЙ Haines    Date of Service: 21          CHIEF COMPLAINT / REASON FOR OFFICE VISIT     Coronary Artery Disease (6 month f/u )      HISTORY OF PRESENT ILLNESS       Coronary Artery Disease  Pertinent negatives include no chest pain, leg swelling or shortness of breath.     Lauri Garcia is a 56 y.o. male who presents today for semiannual evaluation.     Pt has a  history significant for diabetes, asthma, obesity, coronary artery disease status post CABG x4.    Patient reports that he has done well over the past 6 months.  States that he is exercising in the form of walking without any exertional symptoms.  His largest complaint is that he has a difficult time falling asleep.  He attributes this to stress related to his wife's ongoing cancer treatments.  He states that he has tried over-the-counter melatonin which has helped some.  He is compliant with CPAP device.  He does not monitor blood pressure at home.  Cholesterol panel is monitored with his endocrinologist.  Currently denies chest pain, shortness of breath at rest or with exertion, heart palpitations, lightheadedness, lower extremity edema, fatigue.    The following portions of the patient's history were reviewed and updated as appropriate: allergies, current medications, past family history, past medical history, past social history, past surgical history and problem list.      VITAL SIGNS     Visit Vitals  /76 (BP Location: Right arm, Patient Position: Sitting, Cuff Size: Large Adult)   Pulse 95   Ht 181.6 cm (71.5\")   Wt (!) 156 kg (345 lb)   SpO2 98%   BMI 47.45 kg/m²         Wt Readings from Last 3 Encounters:   11/15/21 (!) 156 kg (345 lb)   21 (!) 152 kg (336 lb)   21 (!) 150 kg (330 lb)     Body mass index is 47.45 kg/m².      REVIEW OF SYSTEMS   Review of Systems "   Constitutional: Negative for malaise/fatigue.   Cardiovascular: Negative for chest pain and leg swelling.   Respiratory: Negative for shortness of breath.    Neurological: Negative for light-headedness.   All other systems reviewed and are negative.          PHYSICAL EXAMINATION     Constitutional:       Appearance: Normal appearance. Well-developed. Obese.   Eyes:      Conjunctiva/sclera: Conjunctivae normal.   Neck:      Vascular: No carotid bruit.   Pulmonary:      Effort: Pulmonary effort is normal.      Breath sounds: Normal breath sounds.   Cardiovascular:      Normal rate. Regular rhythm. Normal S1. Normal S2.      Murmurs: There is no murmur.      No gallop. No click. No rub.   Edema:     Peripheral edema absent.   Musculoskeletal: Normal range of motion.      Comments: No pedal edema Skin:     General: Skin is warm and dry.   Neurological:      Mental Status: Alert and oriented to person, place, and time.      GCS: GCS eye subscore is 4. GCS verbal subscore is 5. GCS motor subscore is 6.   Psychiatric:         Speech: Speech normal.         Behavior: Behavior normal.         Thought Content: Thought content normal.         Judgment: Judgment normal.           REVIEWED DATA     Procedures      Cardiac Procedures:  1. Echocardiogram 4/21/2019.  Left ventricular cavity is borderline dilated.  Severe hypokinesis of the inferior septum.  EF 45%.  No evidence of pericardial effusion.  2. Cardiac catheterization 4/21/2019. normal left main, LAD with significant multiple 90% stenosis in the midportion extending to the diagonal branch, circumflex was codominant with 90% stenosis in the midportion and a 90% stenosis of the distal portion, RCA had subtotal occlusion in the midportion.  EF estimated at 45%.  Recommended that patient be considered for CABG.  3. CABG times 4 on 4/24/19. LIMA to mid LAD, saphenous vein to diagonal 2, saphenous vein to distal circumflex, saphenous vein to PDA.  4. Echocardiogram  12/10/2019.  EF 40%.  Normal LV cavity size.  Global LV wall motion appears abnormal.  LV wall thickness consistent with mild to moderate LVH.  Diastolic function indeterminate.  RV cavity is dilated.  LAE.  Moderate mitral valve regurgitation.  Calculated RVSP 29 mmHg.  5. 24-hour Holter 7/9/2020.  Heart rate 87.  Frequent PVCs with a total of 26,662 isolated events comprising 21.5% of total.  Occasional ventricular trigeminy.  Rare ventricular couplets.  6. Echocardiogram 7/7/2020.  LVEF 37%.  Global hypokinesis.  LV cavity is moderately dilated.  Moderate LVH.  Moderate thickening of the aortic valve with no regurgitation and no stenosis.  Trace mitral valve regurgitation.  Trace tricuspid valve regurgitation.  Mild dilation of the Valsalva with aortic root measuring 4.1 cm.    Lipid Panel    Lipid Panel 8/20/21   Total Cholesterol 104   Triglycerides 151 (A)   HDL Cholesterol 30 (A)   VLDL Cholesterol 26   LDL Cholesterol  48   (A) Abnormal value                ASSESSMENT & PLAN      Diagnosis Plan   1. Coronary artery disease involving coronary bypass graft of native heart without angina pectoris     2. Ischemic cardiomyopathy     3. Essential hypertension     4. Type 2 diabetes mellitus without complication, without long-term current use of insulin ([facility])     5. Obstructive sleep apnea syndrome     6. Class 3 severe obesity due to excess calories with serious comorbidity and body mass index (BMI) of 45.0 to 49.9 in adult ([facility])           SUMMARY/DISCUSSION  1. CAD with history of bypass.  Patient doing well from cardiovascular standpoint.  Denies angina or dyspnea.  He exercises in the form of walking without exertional symptoms.  Cholesterol panel is at goal.  He will continue aspirin, atorvastatin, Jardiance, lisinopril, metoprolol succinate.  2. Ischemic cardiomyopathy.  Most recent LVEF 37% which is stable for patient.  He denies orthopnea, PND.  Appears euvolemic on exam.  Continue lisinopril HCTZ,  metoprolol succinate, Jardiance  3. Hypertension.  Blood pressure currently at goal 104/76.  Continue lisinopril HCTZ, metoprolol succinate.  4. Type 2 diabetes  5. KRISTIN on CPAP  6. Obesity  7. Refills provided in office today.  Patient will continue his current medication regimen.  Follow-up with Dr. Samuel in 6 months.  Sooner for problems or complications.        MEDICATIONS         Discharge Medications          Accurate as of November 15, 2021  9:04 AM. If you have any questions, ask your nurse or doctor.            Continue These Medications      Instructions Start Date   Accu-Chek SmartView test strip  Generic drug: glucose blood   USE TO TEST TWICE DAILY      True Metrix Blood Glucose Test test strip  Generic drug: glucose blood   Use to check blood sugar twice a day      allopurinol 300 MG tablet  Commonly known as: ZYLOPRIM   300 mg, Oral, Every Evening      aspirin 81 MG tablet   81 mg, Oral, Daily      atorvastatin 20 MG tablet  Commonly known as: LIPITOR   20 mg, Oral, Every Night at Bedtime      cetirizine 10 MG tablet  Commonly known as: zyrTEC   10 mg, Oral, Daily      fluticasone 50 MCG/ACT nasal spray  Commonly known as: FLONASE   2 sprays, Nasal, Daily PRN      glimepiride 2 MG tablet  Commonly known as: AMARYL   TAKE ONE TABLET BY MOUTH EVERY MORNING      Jardiance 25 MG tablet tablet  Generic drug: empagliflozin   TAKE ONE TABLET BY MOUTH ONCE DAILY      Klor-Con/EF 25 MEQ disintegrating tablet  Generic drug: potassium bicarbonate   50 mEq, Oral, Daily      lisinopril-hydrochlorothiazide 20-12.5 MG per tablet  Commonly known as: Zestoretic   2 tablets, Oral, Daily      melatonin 5 MG tablet tablet   5 mg, Oral, Nightly      metoprolol succinate XL 50 MG 24 hr tablet  Commonly known as: TOPROL-XL   50 mg, Oral, Daily      montelukast 10 MG tablet  Commonly known as: SINGULAIR   10 mg, Oral, Nightly      multivitamin with minerals tablet tablet   1 tablet, Oral, Daily      nitroglycerin 0.4 MG  SL tablet  Commonly known as: NITROSTAT   1 under the tongue as needed for angina, may repeat q5mins for up three doses      SITagliptin-metFORMIN HCl ER  MG tablet  Commonly known as: JANUMET XR   1 tablet, Oral, Daily      Symbicort 160-4.5 MCG/ACT inhaler  Generic drug: budesonide-formoterol   2 puffs, Inhalation, 2 Times Daily - RT      True Metrix Air Glucose Meter device   1 Device, Does not apply, Daily      TRUEplus Lancets 33G misc   Use to check blood sugar twice a day      Accu-Chek FastClix Lancets misc   USE TO CHECK TWICE DAILY                 **Dragon Disclaimer:   Much of this encounter note is an electronic transcription/translation of spoken language to printed text. The electronic translation of spoken language may permit erroneous, or at times, nonsensical words or phrases to be inadvertently transcribed. Although I have reviewed the note for such errors, some may still exist.

## 2021-12-22 DIAGNOSIS — E11.9 TYPE 2 DIABETES MELLITUS WITHOUT COMPLICATION, WITHOUT LONG-TERM CURRENT USE OF INSULIN (HCC): ICD-10-CM

## 2021-12-23 RX ORDER — GLUCOSAM/CHON-MSM1/C/MANG/BOSW 500-416.6
TABLET ORAL
Qty: 200 EACH | Refills: 11 | Status: SHIPPED | OUTPATIENT
Start: 2021-12-23 | End: 2023-03-27

## 2021-12-23 RX ORDER — CALCIUM CITRATE/VITAMIN D3 200MG-6.25
TABLET ORAL
Qty: 200 EACH | Refills: 11 | Status: SHIPPED | OUTPATIENT
Start: 2021-12-23 | End: 2023-03-27

## 2022-03-08 RX ORDER — SITAGLIPTIN AND METFORMIN HYDROCHLORIDE 1000; 50 MG/1; MG/1
TABLET, FILM COATED ORAL
Qty: 180 TABLET | Refills: 0 | OUTPATIENT
Start: 2022-03-08

## 2022-03-14 RX ORDER — SITAGLIPTIN AND METFORMIN HYDROCHLORIDE 1000; 50 MG/1; MG/1
1 TABLET, FILM COATED ORAL 2 TIMES DAILY WITH MEALS
Qty: 60 TABLET | Refills: 6 | Status: SHIPPED | OUTPATIENT
Start: 2022-03-14 | End: 2022-09-06

## 2022-03-24 NOTE — PROGRESS NOTES
[facility]      Patient Name: Lauir Garcia  :1965  Age: 54 y.o.  Primary Cardiologist: Jose Samuel MD  Encounter Provider:  АНДРЕЙ Haines      Chief Complaint:   Chief Complaint   Patient presents with   • Follow-up     1 week Hospital          HPI  Lauri Garcia is a 54 y.o. male who presents today for one-week hospital reevaluation. Pt has a  history significant for diabetes, asthma, obesity, coronary artery disease status post CABG x4.    Patient was hospitalized on -2019. Patient had an echocardiogram on 2019 which revealed an EF of 45% with severe hypokinesis of the inferior septum.  He subsequently underwent cardiac catheterization which revealed a normal left main, LAD with significant multiple 90% stenosis in the midportion extending to the diagonal branch, circumflex was codominant with 90% stenosis in the midportion and a 90% stenosis of the distal portion, RCA had subtotal occlusion in the midportion.  EF estimated at 45%.  Recommended that patient be considered for CABG.     On 2019 patient underwent CABG x4 with LIMA to mid LAD, saphenous vein to diagonal 2, saphenous vein to distal circumflex, saphenous vein to PDA.  During the postoperative state patient did have some sinus tachycardia.  Heart rate averaging in the 110s.  He was started on Lopressor in addition to the amiodarone.  Patient's postop course was unremarkable.  All tubes and wires were removed in a timely fashion.  Patient has been ambulatory at the nursing station.  He was consulted by endocrinology who is made appropriate adjustments to his anti-diabetes regimen.  Patient does have some slight erythema with lymphangitis to the incisions to his right calf region where veins were retrieved.  Cardiothoracic surgery has recommended patient be discharged with a course of Keflex.    Since discharge patient states that he feels he is doing fairly well.  He does state that he  Comprehensive Nutrition Assessment    Type and Reason for Visit:  Consult (Eval & treat)    Nutrition Recommendations/Plan: Continue diet as ordered. Nutrition Assessment:  Pt had been at [facility] V[facility] in May 2021 for cervical myleopathy and had C 3-6 posterior laminectomy and fusion. l Pt came to ED on 3-16 with hx of falling and neck pain. Pt states her appetite is good with stable weight. Malnutrition Assessment:  Malnutrition Status:  No malnutrition    Context:  Acute Illness     Findings of the 6 clinical characteristics of malnutrition:  Energy Intake:  No significant decrease in energy intake  Weight Loss:  No significant weight loss     Body Fat Loss:  No significant body fat loss     Muscle Mass Loss:  No significant muscle mass loss    Fluid Accumulation:  1 - Mild Generalized   Strength:  Not Performed    Estimated Daily Nutrient Needs:  Energy (kcal):  4848-4292 kcals based on [place] with 1.2-1.3 factor using adm wt 81.2 kg; Weight Used for Energy Requirements:  Admission     Protein (g):  62-68 gm protein based on 1.2-1.3 gm/kg using IBW; Weight Used for Protein Requirements:              Nutrition Related Findings:  Edema: +2 generalized, +1 pitting BLE's. Hx: Covid 4-25-20, DM, DVT on Eliquis. Labs & meds reviewed. Wounds:  None       Current Nutrition Therapies:    ADULT DIET; Regular; 4 carb choices (60 gm/meal)    Anthropometric Measures:  · Height: 5' 3\" (160 cm)  · Current Body Weight: 179 lb (81.2 kg)   · Admission Body Weight: 179 lb (81.2 kg)      · Ideal Body Weight: 115 lbs; % Ideal Body Weight 155.7 %   · BMI: 31.7  · BMI Categories: Obese Class 1 (BMI 30.0-34. 9)       Nutrition Diagnosis:   · Overweight/Obese related to excessive energy intake as evidenced by BMI      Nutrition Interventions:   Food and/or Nutrient Delivery:  Continue Current Diet  Nutrition Education/Counseling:  No recommendation at this time   Coordination of Nutrition Care:  Continue to monitor still has decreased his stamina and gets fatigued very easily.  He has now stopped the 7-day course of Lasix and states that he has not had any problems with lower extremity edema or increased shortness of breath.  He does state that he gets short of breath with moderate exertion.  He does have physical therapy twice weekly and is working well with them.  Is interested in cardiac rehab.  Patient states that his short-term disability expires on 6/4/2019.  He is scheduled to see cardiothoracic surgery on 6/5/2019.  Advised him that if he finds out what is needed I will extend his short-term disability until after he is evaluated by the surgeon.  Patient states that his incisions are healing well and he denies any erythema, drainage, soft tissue swelling.    The following portions of the patient's history were reviewed and updated as appropriate: allergies, current medications, past family history, past medical history, past social history, past surgical history and problem list.    Current Outpatient Medications on File Prior to Visit   Medication Sig   • ACCU-CHEK FASTCLIX LANCETS misc USE TO CHECK BLOOD SUGAR TWICE DAILY AND AS NEEDED   • ACCU-CHEK SMARTVIEW test strip USE AS DIRECTED 2 TIMES A DAY   • albuterol sulfate  (90 Base) MCG/ACT inhaler Inhale 2 puffs Every 4 (Four) Hours As Needed for Wheezing.   • allopurinol (ZYLOPRIM) 300 MG tablet Take 300 mg by mouth Every Evening.   • amiodarone (PACERONE) 200 MG tablet Take 1 tablet by mouth Every 12 (Twelve) Hours.   • aspirin 81 MG tablet Take 1 tablet by mouth Daily.   • atorvastatin (LIPITOR) 20 MG tablet Take 1 tablet by mouth Every Night.   • cetirizine (ZyrTEC) 10 MG tablet Take 10 mg by mouth Daily.   • Collagen-Vitamin C (COLLAGEN PLUS VITAMIN C PO) Take 1 tablet by mouth Daily.   • Empagliflozin (JARDIANCE) 25 MG tablet Take 25 mg by mouth Daily.   • fluticasone (FLONASE) 50 MCG/ACT nasal spray 2 sprays into the nostril(s) as directed by provider  while inpatient    Goals:  Stablization of blood sugars with current diet       Nutrition Monitoring and Evaluation:   Behavioral-Environmental Outcomes:  None Identified   Food/Nutrient Intake Outcomes:  Food and Nutrient Intake  Physical Signs/Symptoms Outcomes:  Biochemical Data,Fluid Status or Edema,Nutrition Focused Physical Findings,Skin,Weight     Discharge Planning:    Continue current diet     Some areas of assessment may be incomplete due to COVID-19 precautions. CÉSAR [employer]KENDAL.   Clinical Dietitian  Office: [phone] Daily As Needed for Rhinitis.   • furosemide (LASIX) 20 MG tablet Take 1 tablet by mouth Daily.   • glimepiride (AMARYL) 2 MG tablet Take 1 tablet by mouth Every Morning.   • KLOR-CON/EF 25 MEQ disintegrating tablet Take 50 mEq by mouth Daily.   • lisinopril-hydrochlorothiazide (ZESTORETIC) 20-12.5 MG per tablet Take 2 tablets by mouth Daily.   • melatonin 5 MG tablet tablet Take 5 mg by mouth nightly.   • metFORMIN (GLUCOPHAGE) 1000 MG tablet Take 1,000 mg by mouth 2 (Two) Times a Day With Meals.   • metoprolol tartrate (LOPRESSOR) 50 MG tablet Take 0.5 tablets by mouth 3 (Three) Times a Day.   • montelukast (SINGULAIR) 10 MG tablet Take 10 mg by mouth Every Night.   • Multiple Vitamins-Minerals (MULTIVITAL-M PO) Take 1 tablet by mouth Daily.   • pantoprazole (PROTONIX) 40 MG EC tablet Take 1 tablet by mouth Daily.   • potassium chloride (MICRO-K) 10 MEQ CR capsule Take 2 capsules by mouth Daily.   • SITagliptin (JANUVIA) 100 MG tablet Take 100 mg by mouth Daily.   • SYMBICORT 160-4.5 MCG/ACT inhaler Inhale 2 puffs 2 (Two) Times a Day.   • [DISCONTINUED] Cinnamon 500 MG capsule Take 500 mg by mouth Daily.   • [DISCONTINUED] Coconut Oil 1000 MG capsule Take 2,000 mg by mouth Daily.   • [DISCONTINUED] Flaxseed, Linseed, (FLAXSEED OIL) 1000 MG capsule Take 1,000 mg by mouth Daily.   • [DISCONTINUED] Omega-3 Fatty Acids (FISH OIL) 1000 MG capsule capsule Take 1,000 mg by mouth Daily With Breakfast.     No current facility-administered medications on file prior to visit.          Review of Systems   Constitution: Positive for decreased appetite and malaise/fatigue.   Cardiovascular: Negative for chest pain and leg swelling.   Respiratory: Negative for shortness of breath.    Neurological: Negative for light-headedness.   All other systems reviewed and are negative.      OBJECTIVE:   Vital Signs  Vitals:    05/10/19 1407   BP: 116/76   Pulse: 89   SpO2: 98%     Estimated body mass index is 50.02 kg/m² as calculated from  "the following:    Height as of this encounter: 172.7 cm (68\").    Weight as of this encounter: 149 kg (329 lb).    Physical Exam   Constitutional: He is oriented to person, place, and time. Vital signs are normal. He appears well-developed and well-nourished.   Eyes: Conjunctivae are normal.   Neck: Carotid bruit is not present.   Cardiovascular: Normal rate, regular rhythm and normal heart sounds.   No murmur heard.  Pulmonary/Chest: Effort normal and breath sounds normal.   Abdominal: Normal appearance.   Musculoskeletal: Normal range of motion.   No pedal edema   Neurological: He is alert and oriented to person, place, and time. GCS eye subscore is 4. GCS verbal subscore is 5. GCS motor subscore is 6.   Skin: Skin is warm and dry.   Midsternal incision is well approximated without signs of wound dehiscence, erythema, soft tissue swelling, drainage.     Psychiatric: He has a normal mood and affect. His speech is normal and behavior is normal. Judgment and thought content normal. Cognition and memory are normal.       Procedures    Cardiac Procedures:  1. Echocardiogram 4/21/2019.  Left ventricular cavity is borderline dilated.  Severe hypokinesis of the inferior septum.  EF 45%.  No evidence of pericardial effusion.  2. Cardiac catheterization 4/21/2019. normal left main, LAD with significant multiple 90% stenosis in the midportion extending to the diagonal branch, circumflex was codominant with 90% stenosis in the midportion and a 90% stenosis of the distal portion, RCA had subtotal occlusion in the midportion.  EF estimated at 45%.  Recommended that patient be considered for CABG.  3. CABG times 44/24/19. LIMA to mid LAD, saphenous vein to diagonal 2, saphenous vein to distal circumflex, saphenous vein to PDA.          ASSESSMENT:      Diagnosis Plan   1. Coronary artery disease involving native coronary artery of native heart without angina pectoris     2. Type 2 diabetes mellitus without complication, without " long-term current use of insulin (CMS/[facility])     3. S/P CABG x 4           PLAN OF CARE:     1. Coronary artery disease status post CABG x4.  Patient status post CABG times 44/24/19.  Patient states that he is doing fairly well since discharge from hospital.  States that he is participating with physical therapy twice weekly and is interested in cardiac rehab.  He states that he does have some shortness of breath with moderate to severe exertion and admits that he has some decreased stamina especially when he does activities.  He states that he has completed the 7-day course of Lasix and does not have any lower extremity edema or increased shortness of breath.  States that he is feeling well.  Patient will be referred to cardiac rehab.  Patient is on goal-directed medical therapy with aspirin, Lipitor, lisinopril, Lopressor.  He is scheduled to see cardiothoracic surgeon on 6/5/2019.  Currently his short-term disability expires on 6/4/2019.  Advised the patient and his wife to find out what information was needed from his short-term disability company and I would be happy to provide paperwork to extend that for 1 week after he is evaluated by cardiothoracic surgery so that their input on returning to work can be provided.  2. Type 2 diabetes.  Patient was seen by endocrinology during hospitalization who made recommendations for his diabetic regimen.  He has been followed by his primary care physician.  3. Follow-up with Dr. Samuel at previously scheduled appointment.  Please call the office with any new or worsening symptoms.      Coronary Artery Disease  Assessment  • The patient has no angina    Plan  • Lifestyle modifications discussed include adhering to a heart healthy diet, avoidance of tobacco products, maintenance of a healthy weight, medication compliance, regular exercise and regular monitoring of cholesterol and blood pressure    Subjective - Objective  • There is a history of previous coronary artery  bypass graft  on or around 4/24/2019 LIMA to mid LAD, saphenous vein to diagonal 2, saphenous vein to distal circumflex, saphenous vein to PDA.  • Current antiplatelet therapy includes aspirin 81 mg  • The patient qualifies for cardiac rehabilitation, and has been referred to cardiac rehab          Thank you for allowing me to participate in the care of your patient,      Sincerely,   АНДРЕЙ Haines  [facility]  05/10/19  2:26 PM    **Tanya Disclaimer:**  Much of this encounter note is an electronic transcription/translation of spoken language to printed text. The electronic translation of spoken language may permit erroneous, or at times, nonsensical words or phrases to be inadvertently transcribed. Although I have reviewed the note for such errors, some may still exist.

## 2022-06-07 ENCOUNTER — OFFICE VISIT (OUTPATIENT)
Dept: CARDIOLOGY | Facility: CLINIC | Age: 57
End: 2022-06-07

## 2022-06-07 VITALS
SYSTOLIC BLOOD PRESSURE: 122 MMHG | WEIGHT: 315 LBS | DIASTOLIC BLOOD PRESSURE: 80 MMHG | OXYGEN SATURATION: 96 % | HEART RATE: 79 BPM | HEIGHT: 71 IN | BODY MASS INDEX: 44.1 KG/M2

## 2022-06-07 DIAGNOSIS — E66.01 CLASS 3 SEVERE OBESITY DUE TO EXCESS CALORIES WITH SERIOUS COMORBIDITY AND BODY MASS INDEX (BMI) OF 45.0 TO 49.9 IN ADULT: ICD-10-CM

## 2022-06-07 DIAGNOSIS — Z95.1 S/P CABG X 4: ICD-10-CM

## 2022-06-07 DIAGNOSIS — E11.9 TYPE 2 DIABETES MELLITUS WITHOUT COMPLICATION, WITHOUT LONG-TERM CURRENT USE OF INSULIN: ICD-10-CM

## 2022-06-07 DIAGNOSIS — I10 ESSENTIAL HYPERTENSION: ICD-10-CM

## 2022-06-07 DIAGNOSIS — G47.33 OBSTRUCTIVE SLEEP APNEA SYNDROME: ICD-10-CM

## 2022-06-07 DIAGNOSIS — I25.5 ISCHEMIC CARDIOMYOPATHY: ICD-10-CM

## 2022-06-07 DIAGNOSIS — I25.810 CORONARY ARTERY DISEASE INVOLVING CORONARY BYPASS GRAFT OF NATIVE HEART WITHOUT ANGINA PECTORIS: Primary | ICD-10-CM

## 2022-06-07 PROCEDURE — 99214 OFFICE O/P EST MOD 30 MIN: CPT | Performed by: NURSE PRACTITIONER

## 2022-06-07 PROCEDURE — 93000 ELECTROCARDIOGRAM COMPLETE: CPT | Performed by: NURSE PRACTITIONER

## 2022-06-07 RX ORDER — ATORVASTATIN CALCIUM 20 MG/1
20 TABLET, FILM COATED ORAL
Qty: 90 TABLET | Refills: 3 | Status: SHIPPED | OUTPATIENT
Start: 2022-06-07

## 2022-06-07 RX ORDER — NITROGLYCERIN 0.4 MG/1
TABLET SUBLINGUAL
Qty: 10 TABLET | Refills: 2 | Status: SHIPPED | OUTPATIENT
Start: 2022-06-07

## 2022-06-07 RX ORDER — METOPROLOL SUCCINATE 50 MG/1
50 TABLET, EXTENDED RELEASE ORAL DAILY
Qty: 90 TABLET | Refills: 3 | Status: SHIPPED | OUTPATIENT
Start: 2022-06-07

## 2022-06-07 NOTE — PROGRESS NOTES
"    CARDIOLOGY        Patient Name: Lauri Garcia  :1965  Age: 57 y.o.  Primary Cardiologist: Jose Samuel MD  Encounter Provider:  АНДРЕЙ Haines    Date of Service: 21          CHIEF COMPLAINT / REASON FOR OFFICE VISIT     Coronary Artery Disease (6 month f/u/)      HISTORY OF PRESENT ILLNESS       Coronary Artery Disease  Pertinent negatives include no chest pain, leg swelling or shortness of breath.     Lauri Garcia is a 57 y.o. male who presents today for semiannual evaluation.     Pt has a  history significant for diabetes, asthma, obesity, coronary artery disease status post CABG x4.    Patient reports that he has done well since last assessment.  He notes that his stress and anxiety level has improved since his wife's cancer status is now in remission.  He states that he currently exercises in the form of walking and does not have any limiting symptoms.  Reports that cholesterol is monitored and managed by endocrinology.  He currently has some generalized fatigue that he attributes to trouble sleeping as his wife is undergoing iodine treatments and has to sleep in another room.  He denies any chest discomfort, shortness of breath, dyspnea with exertion, palpitations, lightheadedness, edema.    The following portions of the patient's history were reviewed and updated as appropriate: allergies, current medications, past family history, past medical history, past social history, past surgical history and problem list.      VITAL SIGNS     Visit Vitals  /80 (BP Location: Left arm, Patient Position: Sitting, Cuff Size: Large Adult)   Pulse 79   Ht 180.3 cm (71\")   Wt (!) 157 kg (347 lb 3.2 oz)   SpO2 96%   BMI 48.42 kg/m²         Wt Readings from Last 3 Encounters:   22 (!) 157 kg (347 lb 3.2 oz)   11/15/21 (!) 156 kg (345 lb)   21 (!) 152 kg (336 lb)     Body mass index is 48.42 kg/m².      REVIEW OF SYSTEMS   Review of Systems   Constitutional: Negative for " malaise/fatigue.   Cardiovascular: Negative for chest pain and leg swelling.   Respiratory: Negative for shortness of breath.    Neurological: Negative for light-headedness.   All other systems reviewed and are negative.          PHYSICAL EXAMINATION     Constitutional:       Appearance: Normal appearance. Well-developed. Obese.   Eyes:      Conjunctiva/sclera: Conjunctivae normal.   Neck:      Vascular: No carotid bruit.   Pulmonary:      Effort: Pulmonary effort is normal.      Breath sounds: Normal breath sounds.   Cardiovascular:      Normal rate. Regular rhythm. Normal S1. Normal S2.      Murmurs: There is no murmur.      No gallop. No click. No rub.   Edema:     Peripheral edema absent.   Musculoskeletal: Normal range of motion.      Comments: No pedal edema Skin:     General: Skin is warm and dry.   Neurological:      Mental Status: Alert and oriented to person, place, and time.      GCS: GCS eye subscore is 4. GCS verbal subscore is 5. GCS motor subscore is 6.   Psychiatric:         Speech: Speech normal.         Behavior: Behavior normal.         Thought Content: Thought content normal.         Judgment: Judgment normal.           REVIEWED DATA       ECG 12 Lead    Date/Time: 6/7/2022 4:11 PM  Performed by: Angela Mendieta APRN  Authorized by: Angela Mendieta APRN   Comparison: compared with previous ECG   Rhythm: sinus rhythm  Rate: normal  BPM: 84  Conduction: conduction normal  QRS axis: normal  Other findings: non-specific ST-T wave changes    Clinical impression: normal ECG            Cardiac Procedures:  1. Echocardiogram 4/21/2019.  Left ventricular cavity is borderline dilated.  Severe hypokinesis of the inferior septum.  EF 45%.  No evidence of pericardial effusion.  2. Cardiac catheterization 4/21/2019. normal left main, LAD with significant multiple 90% stenosis in the midportion extending to the diagonal branch, circumflex was codominant with 90% stenosis in the midportion and a 90%  stenosis of the distal portion, RCA had subtotal occlusion in the midportion.  EF estimated at 45%.  Recommended that patient be considered for CABG.  3. CABG times 4 on 4/24/19. LIMA to mid LAD, saphenous vein to diagonal 2, saphenous vein to distal circumflex, saphenous vein to PDA.  4. Echocardiogram 12/10/2019.  EF 40%.  Normal LV cavity size.  Global LV wall motion appears abnormal.  LV wall thickness consistent with mild to moderate LVH.  Diastolic function indeterminate.  RV cavity is dilated.  LAE.  Moderate mitral valve regurgitation.  Calculated RVSP 29 mmHg.  5. 24-hour Holter 7/9/2020.  Heart rate 87.  Frequent PVCs with a total of 26,662 isolated events comprising 21.5% of total.  Occasional ventricular trigeminy.  Rare ventricular couplets.  6. Echocardiogram 7/7/2020.  LVEF 37%.  Global hypokinesis.  LV cavity is moderately dilated.  Moderate LVH.  Moderate thickening of the aortic valve with no regurgitation and no stenosis.  Trace mitral valve regurgitation.  Trace tricuspid valve regurgitation.  Mild dilation of the Valsalva with aortic root measuring 4.1 cm.    Lipid Panel    Lipid Panel 8/20/21   Total Cholesterol 104   Triglycerides 151 (A)   HDL Cholesterol 30 (A)   VLDL Cholesterol 26   LDL Cholesterol  48   (A) Abnormal value                ASSESSMENT & PLAN      Diagnosis Plan   1. Coronary artery disease involving coronary bypass graft of native heart without angina pectoris     2. S/P CABG x 4     3. Ischemic cardiomyopathy     4. Essential hypertension     5. Type 2 diabetes mellitus without complication, without long-term current use of insulin ([facility])     6. Obstructive sleep apnea syndrome     7. Class 3 severe obesity due to excess calories with serious comorbidity and body mass index (BMI) of 45.0 to 49.9 in adult ([facility])           SUMMARY/DISCUSSION  1. CAD with history of bypass.  Patient currently asymptomatic and denies any angina or dyspnea.  He walks regularly without any  exertional symptoms.  He will continue aspirin, atorvastatin, Jardiance, lisinopril, metoprolol succinate.  2. Ischemic cardiomyopathy.  Denies dyspnea, dyspnea with exertion.  Patient euvolemic.  Continue lisinopril HCTZ, metoprolol succinate.  3. Hypertension.  BP currently controlled at 122/80.  Continue lisinopril HCTZ, metoprolol succinate.  4. Type 2 diabetes.  Monitored and controlled by endocrinology.  5. KRISTIN on CPAP  6. Obesity  7. Follow-up with Dr. Samuel in 6 months.  Sooner for problems or complications.        MEDICATIONS         Discharge Medications          Accurate as of June 7, 2022 10:12 AM. If you have any questions, ask your nurse or doctor.            Continue These Medications      Instructions Start Date   Accu-Chek FastClix Lancets misc   USE TO CHECK TWICE DAILY      TRUEplus Lancets 33G misc   USE TO CHECK BLOOD SUGAR TWICE DAILY      Accu-Chek SmartView test strip  Generic drug: glucose blood   USE TO TEST TWICE DAILY      True Metrix Blood Glucose Test test strip  Generic drug: glucose blood   USE TO TEST BLOOD SUGAR TWICE DAILY      allopurinol 300 MG tablet  Commonly known as: ZYLOPRIM   300 mg, Oral, Every Evening      aspirin 81 MG tablet   81 mg, Oral, Daily      atorvastatin 20 MG tablet  Commonly known as: LIPITOR   20 mg, Oral, Every Night at Bedtime      cetirizine 10 MG tablet  Commonly known as: zyrTEC   10 mg, Oral, Daily      fluticasone 50 MCG/ACT nasal spray  Commonly known as: FLONASE   2 sprays, Nasal, Daily PRN      glimepiride 2 MG tablet  Commonly known as: AMARYL   TAKE ONE TABLET BY MOUTH EVERY MORNING      Janumet  MG per tablet  Generic drug: sitaGLIPtin-metFORMIN   1 tablet, Oral, 2 Times Daily With Meals      Jardiance 25 MG tablet tablet  Generic drug: empagliflozin   TAKE ONE TABLET BY MOUTH ONCE DAILY      Klor-Con/EF 25 MEQ disintegrating tablet  Generic drug: potassium bicarbonate   50 mEq, Oral, Daily      lisinopril-hydrochlorothiazide 20-12.5  MG per tablet  Commonly known as: Zestoretic   2 tablets, Oral, Daily      melatonin 5 MG tablet tablet   5 mg, Oral, Nightly      metoprolol succinate XL 50 MG 24 hr tablet  Commonly known as: TOPROL-XL   50 mg, Oral, Daily      montelukast 10 MG tablet  Commonly known as: SINGULAIR   10 mg, Oral, Nightly      multivitamin with minerals tablet tablet   1 tablet, Oral, Daily      nitroglycerin 0.4 MG SL tablet  Commonly known as: NITROSTAT   1 under the tongue as needed for angina, may repeat q5mins for up three doses      Symbicort 160-4.5 MCG/ACT inhaler  Generic drug: budesonide-formoterol   2 puffs, Inhalation, 2 Times Daily - RT      True Metrix Air Glucose Meter device   1 Device, Does not apply, Daily                 **Dragon Disclaimer:   Much of this encounter note is an electronic transcription/translation of spoken language to printed text. The electronic translation of spoken language may permit erroneous, or at times, nonsensical words or phrases to be inadvertently transcribed. Although I have reviewed the note for such errors, some may still exist.

## 2022-06-09 RX ORDER — GLIMEPIRIDE 2 MG/1
TABLET ORAL
Qty: 90 TABLET | Refills: 11 | Status: SHIPPED | OUTPATIENT
Start: 2022-06-09

## 2022-06-09 RX ORDER — EMPAGLIFLOZIN 25 MG/1
TABLET, FILM COATED ORAL
Qty: 90 TABLET | Refills: 11 | Status: SHIPPED | OUTPATIENT
Start: 2022-06-09

## 2022-09-02 ENCOUNTER — OFFICE VISIT (OUTPATIENT)
Dept: ENDOCRINOLOGY | Age: 57
End: 2022-09-02

## 2022-09-02 VITALS
TEMPERATURE: 98 F | HEART RATE: 85 BPM | HEIGHT: 72 IN | BODY MASS INDEX: 42.66 KG/M2 | OXYGEN SATURATION: 96 % | DIASTOLIC BLOOD PRESSURE: 74 MMHG | SYSTOLIC BLOOD PRESSURE: 130 MMHG | WEIGHT: 315 LBS

## 2022-09-02 DIAGNOSIS — E78.2 HYPERLIPEMIA, MIXED: ICD-10-CM

## 2022-09-02 DIAGNOSIS — E11.9 TYPE 2 DIABETES MELLITUS WITHOUT COMPLICATION, WITHOUT LONG-TERM CURRENT USE OF INSULIN: Primary | ICD-10-CM

## 2022-09-02 DIAGNOSIS — E66.01 MORBID OBESITY: ICD-10-CM

## 2022-09-02 PROCEDURE — 99214 OFFICE O/P EST MOD 30 MIN: CPT | Performed by: INTERNAL MEDICINE

## 2022-09-02 RX ORDER — TRAZODONE HYDROCHLORIDE 50 MG/1
50 TABLET ORAL
COMMUNITY
Start: 2022-06-23

## 2022-09-02 NOTE — PROGRESS NOTES
"Chief Complaint  Chief Complaint   Patient presents with   • Diabetes       Subjective          History of Present Illness    Lauri Garcia 57 y.o. presents with Type 2 dm as a  F/u    Type 2 dm - Diagnosed about 20 +  Today in clinic pt reports being on glimepiride - 2 mg po daily in the morning, jardiance 25 mg po daily, janumet 1 tab two times a day.   Avg bg - 90 days - 129 mg/dl.   Checks BG - 1 - 2 times/day  Sensor - x  Dm retinopathy -x ,Last eye exam - uptodate with exam  Dm nephropathy - x  Dm neuropathy - x,Dm neuropathy meds - n/a  CAD - yes  CVA -x  Episodes of hypoglycemia - x  Pt is physically active. weight has been stable.   Pt tries to follow DM diet for most part.         Reviewed primary care physician's/consulting physician documentation and lab results         I have reviewed the patient's allergies, medicines, past medical hx, family hx and social hx in detail.    Objective   Vital Signs:   /74   Pulse 85   Temp 98 °F (36.7 °C)   Ht 181.6 cm (71.5\")   Wt (!) 155 kg (342 lb)   SpO2 96%   BMI 47.03 kg/m²   Physical Exam   General appearance - no distress  Eyes- anicteric sclera  Ear nose and throat-external ears and nose normal.    Respiratory-normal chest on inspection.  No respiratory distress noted.  Skin-no rashes.  Neuro-alert and oriented x3            Result Review :   The following data was reviewed by: Rakesh Weir MD on 09/02/2022:  Results Encounter on 03/02/2022   Component Date Value Ref Range Status   • TSH 08/19/2022 1.560  0.450 - 4.500 uIU/mL Final   • Free T4 08/19/2022 1.20  0.82 - 1.77 ng/dL Final   • T3, Free 08/19/2022 3.9  2.0 - 4.4 pg/mL Final   • Glucose 08/19/2022 107 (A) 65 - 99 mg/dL Final   • BUN 08/19/2022 20  6 - 24 mg/dL Final   • Creatinine 08/19/2022 0.93  0.76 - 1.27 mg/dL Final   • EGFR Result 08/19/2022 97  >59 mL/min/1.73 Final   • BUN/Creatinine Ratio 08/19/2022 22 (A) 9 - 20 Final   • Sodium 08/19/2022 139  134 - 144 mmol/L Final   • " Potassium 08/19/2022 4.1  3.5 - 5.2 mmol/L Final   • Chloride 08/19/2022 99  96 - 106 mmol/L Final   • Total CO2 08/19/2022 25  20 - 29 mmol/L Final   • Calcium 08/19/2022 9.8  8.7 - 10.2 mg/dL Final   • Hemoglobin A1C 08/19/2022 6.7 (A) 4.8 - 5.6 % Final    Comment:          Prediabetes: 5.7 - 6.4           Diabetes: >6.4           Glycemic control for adults with diabetes: <7.0     • Total Cholesterol 08/19/2022 99 (A) 100 - 199 mg/dL Final   • Triglycerides 08/19/2022 167 (A) 0 - 149 mg/dL Final   • HDL Cholesterol 08/19/2022 24 (A) >39 mg/dL Final   • VLDL Cholesterol Magnus 08/19/2022 28  5 - 40 mg/dL Final   • LDL Chol Calc (NIH) 08/19/2022 47  0 - 99 mg/dL Final   • 25 Hydroxy, Vitamin D 08/19/2022 44.0  30.0 - 100.0 ng/mL Final    Comment: Vitamin D deficiency has been defined by the [place] of  Medicine and an Endocrine Society practice guideline as a  level of serum 25-OH vitamin D less than 20 ng/mL (1,2).  The Endocrine Society went on to further define vitamin D  insufficiency as a level between 21 and 29 ng/mL (2).  1. IOM ([place] of Medicine). 2010. Dietary reference     intakes for calcium and D. [place]: The     National Academies Press.  2. Catrachito MF, Yesica BAKER, Tanvir CARDONA, et al.     Evaluation, treatment, and prevention of vitamin D     deficiency: an Endocrine Society clinical practice     guideline. JCEM. 2011 Jul; 96(7):1911-30.     • Interpretation 08/19/2022 Note   Final    Supplemental report is available.     Data reviewed: PCP notes       Results Review:    I reviewed the patient's new clinical results.     Assessment and Plan    Problem List Items Addressed This Visit        Other    Type 2 diabetes mellitus without complication (HCC) - Primary    Relevant Orders    TSH    T4, Free    Basic Metabolic Panel    Hemoglobin A1c    Lipid Panel    Vitamin B12 & Folate    Vitamin D 25 Hydroxy      Other Visit Diagnoses     Hyperlipemia, mixed        Relevant Orders    TSH     "T4, Free    Basic Metabolic Panel    Hemoglobin A1c    Lipid Panel    Vitamin B12 & Folate    Vitamin D 25 Hydroxy    Morbid obesity (HCC)        Relevant Orders    TSH    T4, Free    Basic Metabolic Panel    Hemoglobin A1c    Lipid Panel    Vitamin B12 & Folate    Vitamin D 25 Hydroxy        Type 2 diabetes mellitus-uncontrolled with hyperglycemia  Continue the above oral hypoglycemic agents.    Hyperlipidemia  Continue Lipitor.    Interpreted the blood work-up/imaging results performed by the primary care/consulting physician -    Refills sent to pharmacy    Follow Up     Patient was given instructions and counseling regarding her condition or for health maintenance advice. Please see specific information pulled into the AVS if appropriate.       Thank you for asking me to see your patient, Lauri Garcia in consultation.         Rakesh Weir MD  09/02/22      EM[facility] Dragon / transcription disclaimer:     \"Dictated utilizing Dragon dictation\".         "

## 2022-09-06 RX ORDER — SITAGLIPTIN AND METFORMIN HYDROCHLORIDE 1000; 50 MG/1; MG/1
TABLET, FILM COATED ORAL
Qty: 180 TABLET | Refills: 1 | Status: SHIPPED | OUTPATIENT
Start: 2022-09-06 | End: 2023-02-10

## 2022-12-27 ENCOUNTER — OFFICE VISIT (OUTPATIENT)
Dept: CARDIOLOGY | Facility: CLINIC | Age: 57
End: 2022-12-27

## 2022-12-27 VITALS
WEIGHT: 315 LBS | SYSTOLIC BLOOD PRESSURE: 130 MMHG | HEART RATE: 70 BPM | HEIGHT: 72 IN | OXYGEN SATURATION: 97 % | BODY MASS INDEX: 42.66 KG/M2 | DIASTOLIC BLOOD PRESSURE: 70 MMHG

## 2022-12-27 DIAGNOSIS — I25.810 CORONARY ARTERY DISEASE INVOLVING CORONARY BYPASS GRAFT OF NATIVE HEART WITHOUT ANGINA PECTORIS: Primary | ICD-10-CM

## 2022-12-27 DIAGNOSIS — E11.9 TYPE 2 DIABETES MELLITUS WITHOUT COMPLICATION, WITHOUT LONG-TERM CURRENT USE OF INSULIN: ICD-10-CM

## 2022-12-27 DIAGNOSIS — Z95.1 S/P CABG X 4: ICD-10-CM

## 2022-12-27 DIAGNOSIS — I25.5 ISCHEMIC CARDIOMYOPATHY: ICD-10-CM

## 2022-12-27 DIAGNOSIS — E66.01 CLASS 3 SEVERE OBESITY DUE TO EXCESS CALORIES WITH SERIOUS COMORBIDITY AND BODY MASS INDEX (BMI) OF 45.0 TO 49.9 IN ADULT: ICD-10-CM

## 2022-12-27 DIAGNOSIS — I10 ESSENTIAL HYPERTENSION: ICD-10-CM

## 2022-12-27 DIAGNOSIS — G47.33 OBSTRUCTIVE SLEEP APNEA SYNDROME: ICD-10-CM

## 2022-12-27 PROCEDURE — 99214 OFFICE O/P EST MOD 30 MIN: CPT | Performed by: NURSE PRACTITIONER

## 2022-12-27 NOTE — PROGRESS NOTES
"    CARDIOLOGY        Patient Name: Lauri Garcia  :1965  Age: 57 y.o.  Primary Cardiologist: Jose Samuel MD  Encounter Provider:  АНДРЕЙ Haines    Date of Service: 22        CHIEF COMPLAINT / REASON FOR OFFICE VISIT     Coronary Artery Disease      HISTORY OF PRESENT ILLNESS       Coronary Artery Disease  Pertinent negatives include no chest pain, leg swelling or shortness of breath.     Lauri Garcia is a 57 y.o. male who presents today for semiannual evaluation.     Pt has a  history significant for diabetes, asthma, obesity, coronary artery disease status post CABG x4.    Patient reports that he has done well since last assessment.  He is exercising and does not have any limiting symptoms.  He is actively attempting to lose weight and is actually lost about 10 pounds since September.  He is currently asymptomatic and denies any episodes of chest pain, shortness of breath, palpitations, lightheadedness, swelling or fatigue.      The following portions of the patient's history were reviewed and updated as appropriate: allergies, current medications, past family history, past medical history, past social history, past surgical history and problem list.      VITAL SIGNS     Visit Vitals  /70 (BP Location: Left arm)   Pulse 70   Ht 181.6 cm (71.5\")   Wt (!) 151 kg (332 lb 6.4 oz)   SpO2 97%   BMI 45.71 kg/m²         Wt Readings from Last 3 Encounters:   22 (!) 151 kg (332 lb 6.4 oz)   22 (!) 155 kg (342 lb)   22 (!) 157 kg (347 lb 3.2 oz)     Body mass index is 45.71 kg/m².      REVIEW OF SYSTEMS   Review of Systems   Constitutional: Negative for malaise/fatigue.   Cardiovascular: Negative for chest pain and leg swelling.   Respiratory: Negative for shortness of breath.    Neurological: Negative for light-headedness.   All other systems reviewed and are negative.          PHYSICAL EXAMINATION     Constitutional:       Appearance: Normal appearance. Well-developed. " Obese.   Eyes:      Conjunctiva/sclera: Conjunctivae normal.   Neck:      Vascular: No carotid bruit.   Pulmonary:      Effort: Pulmonary effort is normal.      Breath sounds: Normal breath sounds.   Cardiovascular:      Normal rate. Regular rhythm. Normal S1. Normal S2.      Murmurs: There is no murmur.      No gallop. No click. No rub.   Edema:     Peripheral edema absent.   Musculoskeletal: Normal range of motion.      Comments: No pedal edema Skin:     General: Skin is warm and dry.   Neurological:      Mental Status: Alert and oriented to person, place, and time.      GCS: GCS eye subscore is 4. GCS verbal subscore is 5. GCS motor subscore is 6.   Psychiatric:         Speech: Speech normal.         Behavior: Behavior normal.         Thought Content: Thought content normal.         Judgment: Judgment normal.           REVIEWED DATA     Procedures    Cardiac Procedures:  1. Echocardiogram 4/21/2019.  Left ventricular cavity is borderline dilated.  Severe hypokinesis of the inferior septum.  EF 45%.  No evidence of pericardial effusion.  2. Cardiac catheterization 4/21/2019. normal left main, LAD with significant multiple 90% stenosis in the midportion extending to the diagonal branch, circumflex was codominant with 90% stenosis in the midportion and a 90% stenosis of the distal portion, RCA had subtotal occlusion in the midportion.  EF estimated at 45%.  Recommended that patient be considered for CABG.  3. CABG times 4 on 4/24/19. LIMA to mid LAD, saphenous vein to diagonal 2, saphenous vein to distal circumflex, saphenous vein to PDA.  4. Echocardiogram 12/10/2019.  EF 40%.  Normal LV cavity size.  Global LV wall motion appears abnormal.  LV wall thickness consistent with mild to moderate LVH.  Diastolic function indeterminate.  RV cavity is dilated.  LAE.  Moderate mitral valve regurgitation.  Calculated RVSP 29 mmHg.  5. 24-hour Holter 7/9/2020.  Heart rate 87.  Frequent PVCs with a total of 26,662 isolated  events comprising 21.5% of total.  Occasional ventricular trigeminy.  Rare ventricular couplets.  6. Echocardiogram 7/7/2020.  LVEF 37%.  Global hypokinesis.  LV cavity is moderately dilated.  Moderate LVH.  Moderate thickening of the aortic valve with no regurgitation and no stenosis.  Trace mitral valve regurgitation.  Trace tricuspid valve regurgitation.  Mild dilation of the Valsalva with aortic root measuring 4.1 cm.    Lipid Panel    Lipid Panel 8/19/22   Total Cholesterol 99 (A)   Triglycerides 167 (A)   HDL Cholesterol 24 (A)   VLDL Cholesterol 28   LDL Cholesterol  47   (A) Abnormal value                ASSESSMENT & PLAN     Diagnoses and all orders for this visit:    1. Coronary artery disease involving coronary bypass graft of native heart without angina pectoris (Primary)  2. S/P CABG x 4  · Patient denies angina or dyspnea  · Exercising without limiting symptoms  · Continue GDMT with lisinopril, atorvastatin, metoprolol succinate, Jardiance, aspirin    3. Ischemic cardiomyopathy  · Denies dyspnea, dyspnea on exertion  · Appears euvolemic on exam  · Continue GDMT with lisinopril, metoprolol succinate, Jardiance    4. Essential hypertension  · Blood pressure currently controlled at 130/70  · Continue lisinopril HCTZ, metoprolol succinate    5. Type 2 diabetes mellitus without complication, without long-term current use of insulin ([facility])    6. Obstructive sleep apnea syndrome    7. Class 3 severe obesity due to excess calories with serious comorbidity and body mass index (BMI) of 45.0 to 49.9 in adult (HCC)  · Attempting to lose weight and has lost 10 pounds since September Return in about 6 months (around 6/27/2023) for Dr. Celaya- Routine.    Future Appointments       Provider Department Center    1/23/2023 8:10 AM [facility]IMENEZ DOWNEY [facility]    2/6/2023 8:30 AM Velia Dias APRN [facility] ENDOCRINOLOGY [facility]    6/27/2023 8:00 AM Belia  Charles THEODORE MD [facility] CARDIOLOGY [facility]    9/1/2023 8:45 AM Rakesh Weir MD [facility]OCRINOLOGY [facility]            MEDICATIONS         Discharge Medications          Accurate as of December 27, 2022 12:22 PM. If you have any questions, ask your nurse or doctor.            Continue These Medications      Instructions Start Date   allopurinol 300 MG tablet  Commonly known as: ZYLOPRIM   300 mg, Oral, Every Evening      aspirin 81 MG tablet   81 mg, Oral, Daily      atorvastatin 20 MG tablet  Commonly known as: LIPITOR   20 mg, Oral, Every Night at Bedtime      cetirizine 10 MG tablet  Commonly known as: zyrTEC   10 mg, Oral, Daily      fluticasone 50 MCG/ACT nasal spray  Commonly known as: FLONASE   2 sprays, Nasal, Daily PRN      glimepiride 2 MG tablet  Commonly known as: AMARYL   TAKE ONE TABLET BY MOUTH EVERY MORNING      Janumet  MG per tablet  Generic drug: sitaGLIPtin-metFORMIN   TAKE ONE TABLET BY MOUTH 2 TIMES A DAY WITH MEALS      Jardiance 25 MG tablet tablet  Generic drug: empagliflozin   TAKE ONE TABLET BY MOUTH ONCE DAILY      Klor-Con/EF 25 MEQ disintegrating tablet  Generic drug: potassium bicarbonate   50 mEq, Oral, Daily      lisinopril-hydrochlorothiazide 20-12.5 MG per tablet  Commonly known as: Zestoretic   2 tablets, Oral, Daily      melatonin 5 MG tablet tablet   5 mg, Oral, Nightly      metoprolol succinate XL 50 MG 24 hr tablet  Commonly known as: TOPROL-XL   50 mg, Oral, Daily      montelukast 10 MG tablet  Commonly known as: SINGULAIR   10 mg, Oral, Nightly      multivitamin with minerals tablet tablet   1 tablet, Oral, Daily      nitroglycerin 0.4 MG SL tablet  Commonly known as: NITROSTAT   1 under the tongue as needed for angina, may repeat q5mins for up three doses      traZODone 50 MG tablet  Commonly known as: DESYREL   50 mg, Take 1-2 tablet daily for sleep      True Metrix Air Glucose Meter device   1 Device, Does not apply, Daily       True Metrix Blood Glucose Test test strip  Generic drug: glucose blood   USE TO TEST BLOOD SUGAR TWICE DAILY      TRUEplus Lancets 33G misc   USE TO CHECK BLOOD SUGAR TWICE DAILY         Stop These Medications    Symbicort 160-4.5 MCG/ACT inhaler  Generic drug: budesonide-formoterol  Stopped by: АНДРЕЙ Haines                **Dragon Disclaimer:   Much of this encounter note is an electronic transcription/translation of spoken language to printed text. The electronic translation of spoken language may permit erroneous, or at times, nonsensical words or phrases to be inadvertently transcribed. Although I have reviewed the note for such errors, some may still exist.

## 2023-01-23 ENCOUNTER — LAB (OUTPATIENT)
Dept: ENDOCRINOLOGY | Age: 58
End: 2023-01-23
Payer: COMMERCIAL

## 2023-01-23 DIAGNOSIS — E11.9 TYPE 2 DIABETES MELLITUS WITHOUT COMPLICATION, WITHOUT LONG-TERM CURRENT USE OF INSULIN: ICD-10-CM

## 2023-01-23 DIAGNOSIS — E78.2 HYPERLIPEMIA, MIXED: ICD-10-CM

## 2023-01-23 DIAGNOSIS — E66.01 MORBID OBESITY: ICD-10-CM

## 2023-01-24 LAB
25(OH)D3+25(OH)D2 SERPL-MCNC: 41.8 NG/ML (ref 30–100)
BUN SERPL-MCNC: 15 MG/DL (ref 6–20)
BUN/CREAT SERPL: 14.9 (ref 7–25)
CALCIUM SERPL-MCNC: 10.2 MG/DL (ref 8.6–10.5)
CHLORIDE SERPL-SCNC: 103 MMOL/L (ref 98–107)
CHOLEST SERPL-MCNC: 100 MG/DL (ref 0–200)
CO2 SERPL-SCNC: 29.1 MMOL/L (ref 22–29)
CREAT SERPL-MCNC: 1.01 MG/DL (ref 0.76–1.27)
EGFRCR SERPLBLD CKD-EPI 2021: 87.8 ML/MIN/1.73
FOLATE SERPL-MCNC: >20 NG/ML (ref 4.78–24.2)
GLUCOSE SERPL-MCNC: 95 MG/DL (ref 65–99)
HBA1C MFR BLD: 6.3 % (ref 4.8–5.6)
HDLC SERPL-MCNC: 30 MG/DL (ref 40–60)
IMP & REVIEW OF LAB RESULTS: NORMAL
LDLC SERPL CALC-MCNC: 48 MG/DL (ref 0–100)
POTASSIUM SERPL-SCNC: 4.1 MMOL/L (ref 3.5–5.2)
SODIUM SERPL-SCNC: 141 MMOL/L (ref 136–145)
T4 FREE SERPL-MCNC: 1.43 NG/DL (ref 0.93–1.7)
TRIGL SERPL-MCNC: 123 MG/DL (ref 0–150)
TSH SERPL DL<=0.005 MIU/L-ACNC: 2.84 UIU/ML (ref 0.27–4.2)
VIT B12 SERPL-MCNC: 481 PG/ML (ref 211–946)
VLDLC SERPL CALC-MCNC: 22 MG/DL (ref 5–40)

## 2023-02-06 ENCOUNTER — OFFICE VISIT (OUTPATIENT)
Dept: ENDOCRINOLOGY | Age: 58
End: 2023-02-06
Payer: COMMERCIAL

## 2023-02-06 VITALS
OXYGEN SATURATION: 97 % | HEART RATE: 87 BPM | SYSTOLIC BLOOD PRESSURE: 130 MMHG | DIASTOLIC BLOOD PRESSURE: 86 MMHG | BODY MASS INDEX: 44.1 KG/M2 | TEMPERATURE: 97.5 F | WEIGHT: 315 LBS | HEIGHT: 71 IN

## 2023-02-06 DIAGNOSIS — E78.2 HYPERLIPEMIA, MIXED: ICD-10-CM

## 2023-02-06 DIAGNOSIS — E11.9 TYPE 2 DIABETES MELLITUS WITHOUT COMPLICATION, WITHOUT LONG-TERM CURRENT USE OF INSULIN: Primary | ICD-10-CM

## 2023-02-06 DIAGNOSIS — E66.01 MORBID OBESITY: ICD-10-CM

## 2023-02-06 PROCEDURE — 99214 OFFICE O/P EST MOD 30 MIN: CPT | Performed by: NURSE PRACTITIONER

## 2023-02-06 RX ORDER — LORAZEPAM 1 MG/1
TABLET ORAL
COMMUNITY
Start: 2023-01-19

## 2023-02-06 NOTE — PROGRESS NOTES
"Chief Complaint  Diabetes (Type 2: Pt doesn't have meter, checks bs once a day, is up to date on eye exam, no hx of retinopathy or neuropathy. )    Subjective        Lauri Garcia presents to [facility] ENDOCRINOLOGY  History of Present Illness     Lab Results   Component Value Date    HGBA1C 6.30 (H) 01/23/2023       Type 2 dm    Diagnosed about 20 +  Today in clinic pt reports being on glimepiride - 2 mg po daily in the morning, jardiance 25 mg po daily, janumet 1 tab two times a day.   Avg bg -  ( 130-140 rare)  Checks BG - QAM  Last eye exam - April 2022  Dm nephropathy -  denies   Dm neuropathy - denies   CAD - yes, stable   CVA - denies   Episodes of hypoglycemia - denies   30mins of walking a day  On statin and ACE    Lab Results   Component Value Date    CHOL 94 04/22/2019    CHLPL 100 01/23/2023    TRIG 123 01/23/2023    HDL 30 (L) 01/23/2023    LDL 48 01/23/2023         Objective   Vital Signs:  /86   Pulse 87   Temp 97.5 °F (36.4 °C) (Temporal)   Ht 181.6 cm (71.5\")   Wt (!) 152 kg (335 lb)   SpO2 97%   BMI 46.08 kg/m²   Estimated body mass index is 46.08 kg/m² as calculated from the following:    Height as of this encounter: 181.6 cm (71.5\").    Weight as of this encounter: 152 kg (335 lb).             Physical Exam  Vitals reviewed.   Constitutional:       General: He is not in acute distress.     Appearance: He is obese.   HENT:      Head: Normocephalic and atraumatic.   Eyes:      General:         Right eye: No discharge.         Left eye: No discharge.   Pulmonary:      Effort: Pulmonary effort is normal. No respiratory distress.   Musculoskeletal:         General: No signs of injury. Normal range of motion.      Cervical back: Normal range of motion and neck supple.   Skin:     General: Skin is warm and dry.   Neurological:      Mental Status: He is alert and oriented to person, place, and time. Mental status is at baseline.   Psychiatric:         Mood and " Affect: Mood normal.         Behavior: Behavior normal.         Thought Content: Thought content normal.         Judgment: Judgment normal.        Result Review :  The following data was reviewed by: АНДРЕЙ Martínez on 02/06/2023:  Common labs    Common Labs 8/19/22 8/19/22 8/19/22 1/17/23 1/23/23 1/23/23 1/23/23    0811 0811 0811  0812 0812 0812   Glucose 107 (A)    95     BUN 20    15     Creatinine 0.93    1.01     Sodium 139    141     Potassium 4.1    4.1     Chloride 99    103     Calcium 9.8    10.2     WBC    6.7      Hemoglobin    15.9      Hematocrit    46.0      Platelets    245      Total Cholesterol   99 (A)    100   Triglycerides   167 (A)    123   HDL Cholesterol   24 (A)    30 (A)   LDL Cholesterol    47    48   Hemoglobin A1C  6.7 (A)    6.30 (A)    (A) Abnormal value       Comments are available for some flowsheets but are not being displayed.                        Assessment and Plan   Diagnoses and all orders for this visit:    1. Type 2 diabetes mellitus without complication, without long-term current use of insulin (HCC) (Primary)  -     Hemoglobin A1c; Future  -     Comprehensive Metabolic Panel; Future  -     Lipid Panel; Future  -     Microalbumin / Creatinine Urine Ratio - Urine, Clean Catch; Future    2. Morbid obesity (HCC)    3. Hyperlipemia, mixed  -     Hemoglobin A1c; Future  -     Comprehensive Metabolic Panel; Future  -     Lipid Panel; Future  -     Microalbumin / Creatinine Urine Ratio - Urine, Clean Catch; Future             Follow Up   Return in about 6 months (around 8/6/2023).     a1c at goal, ok to hold/ stop glimepiride unless fasting BS consistently above 120  LDL at goal, continue statin  continue ACE, repeat UA with next lab draw    Patient was given instructions and counseling regarding his condition or for health maintenance advice. Please see specific information pulled into the AVS if appropriate.     АНДРЕЙ Martínez

## 2023-02-10 RX ORDER — SITAGLIPTIN AND METFORMIN HYDROCHLORIDE 1000; 50 MG/1; MG/1
TABLET, FILM COATED ORAL
Qty: 180 TABLET | Refills: 1 | Status: SHIPPED | OUTPATIENT
Start: 2023-02-10

## 2023-03-26 DIAGNOSIS — E11.9 TYPE 2 DIABETES MELLITUS WITHOUT COMPLICATION, WITHOUT LONG-TERM CURRENT USE OF INSULIN: ICD-10-CM

## 2023-03-27 DIAGNOSIS — E11.9 TYPE 2 DIABETES MELLITUS WITHOUT COMPLICATION, WITHOUT LONG-TERM CURRENT USE OF INSULIN: ICD-10-CM

## 2023-03-27 RX ORDER — GLUCOSAM/CHON-MSM1/C/MANG/BOSW 500-416.6
TABLET ORAL
Qty: 200 EACH | Refills: 11 | Status: SHIPPED | OUTPATIENT
Start: 2023-03-27

## 2023-06-13 NOTE — TELEPHONE ENCOUNTER
Rx Refill Note  Requested Prescriptions     Pending Prescriptions Disp Refills    metoprolol succinate XL (TOPROL-XL) 50 MG 24 hr tablet [Pharmacy Med Name: METOPROLOL SUCCINATE ER 50 MG Tablet Extended Release 24 Hour] 90 tablet 3     Sig: TAKE 1 TABLET EVERY DAY    nitroglycerin (NITROSTAT) 0.4 MG SL tablet [Pharmacy Med Name: NITROGLYCERIN 0.4 MG Tablet Sublingual] 25 tablet      Sig: DISSOLVE 1 TABLET UNDER THE TONGUE AS NEEDED FOR CHEST PAIN MAY REPEAT  EVERY  5  MINUTES FOR UP TO 3 DOSES    atorvastatin (LIPITOR) 20 MG tablet [Pharmacy Med Name: ATORVASTATIN CALCIUM 20 MG Tablet] 90 tablet 3     Sig: TAKE 1 TABLET AT BEDTIME      Last office visit with prescribing clinician: 12/27/2022   Last telemedicine visit with prescribing clinician: Visit date not found   Next office visit with prescribing clinician: 6/27/2023                         Would you like a call back once the refill request has been completed: [] Yes [] No    If the office needs to give you a call back, can they leave a voicemail: [] Yes [] No    Florida Pacheco MA  06/13/23, 16:14 EDT

## 2023-06-14 RX ORDER — ATORVASTATIN CALCIUM 20 MG/1
TABLET, FILM COATED ORAL
Qty: 90 TABLET | Refills: 3 | Status: SHIPPED | OUTPATIENT
Start: 2023-06-14

## 2023-06-14 RX ORDER — NITROGLYCERIN 0.4 MG/1
TABLET SUBLINGUAL
Qty: 25 TABLET | Refills: 0 | Status: SHIPPED | OUTPATIENT
Start: 2023-06-14

## 2023-06-14 RX ORDER — METOPROLOL SUCCINATE 50 MG/1
TABLET, EXTENDED RELEASE ORAL
Qty: 90 TABLET | Refills: 3 | Status: SHIPPED | OUTPATIENT
Start: 2023-06-14

## 2023-07-31 DIAGNOSIS — E11.9 TYPE 2 DIABETES MELLITUS WITHOUT COMPLICATION, WITHOUT LONG-TERM CURRENT USE OF INSULIN: ICD-10-CM

## 2023-07-31 DIAGNOSIS — E78.2 HYPERLIPEMIA, MIXED: ICD-10-CM

## 2023-08-01 LAB
ALBUMIN SERPL-MCNC: 4.2 G/DL (ref 3.5–5.2)
ALBUMIN/CREAT UR: 4 MG/G CREAT (ref 0–29)
ALBUMIN/GLOB SERPL: 2 G/DL
ALP SERPL-CCNC: 68 U/L (ref 39–117)
ALT SERPL-CCNC: 31 U/L (ref 1–41)
AST SERPL-CCNC: 22 U/L (ref 1–40)
BILIRUB SERPL-MCNC: 0.4 MG/DL (ref 0–1.2)
BUN SERPL-MCNC: 12 MG/DL (ref 6–20)
BUN/CREAT SERPL: 12.4 (ref 7–25)
CALCIUM SERPL-MCNC: 10.7 MG/DL (ref 8.6–10.5)
CHLORIDE SERPL-SCNC: 101 MMOL/L (ref 98–107)
CHOLEST SERPL-MCNC: 104 MG/DL (ref 0–200)
CO2 SERPL-SCNC: 28.7 MMOL/L (ref 22–29)
CREAT SERPL-MCNC: 0.97 MG/DL (ref 0.76–1.27)
CREAT UR-MCNC: 124.3 MG/DL
EGFRCR SERPLBLD CKD-EPI 2021: 91.6 ML/MIN/1.73
GLOBULIN SER CALC-MCNC: 2.1 GM/DL
GLUCOSE SERPL-MCNC: 101 MG/DL (ref 65–99)
HBA1C MFR BLD: 6.4 % (ref 4.8–5.6)
HDLC SERPL-MCNC: 29 MG/DL (ref 40–60)
IMP & REVIEW OF LAB RESULTS: NORMAL
LDLC SERPL CALC-MCNC: 46 MG/DL (ref 0–100)
MICROALBUMIN UR-MCNC: 5 UG/ML
POTASSIUM SERPL-SCNC: 4.3 MMOL/L (ref 3.5–5.2)
PROT SERPL-MCNC: 6.3 G/DL (ref 6–8.5)
SODIUM SERPL-SCNC: 141 MMOL/L (ref 136–145)
TRIGL SERPL-MCNC: 172 MG/DL (ref 0–150)
VLDLC SERPL CALC-MCNC: 29 MG/DL (ref 5–40)

## 2023-08-07 ENCOUNTER — SPECIALTY PHARMACY (OUTPATIENT)
Dept: ENDOCRINOLOGY | Age: 58
End: 2023-08-07
Payer: COMMERCIAL

## 2023-08-07 ENCOUNTER — OFFICE VISIT (OUTPATIENT)
Dept: ENDOCRINOLOGY | Age: 58
End: 2023-08-07
Payer: COMMERCIAL

## 2023-08-07 VITALS
HEIGHT: 71 IN | TEMPERATURE: 96.1 F | OXYGEN SATURATION: 96 % | HEART RATE: 82 BPM | BODY MASS INDEX: 44.1 KG/M2 | WEIGHT: 315 LBS | DIASTOLIC BLOOD PRESSURE: 74 MMHG | SYSTOLIC BLOOD PRESSURE: 128 MMHG

## 2023-08-07 DIAGNOSIS — E66.01 CLASS 3 SEVERE OBESITY DUE TO EXCESS CALORIES WITH SERIOUS COMORBIDITY AND BODY MASS INDEX (BMI) OF 45.0 TO 49.9 IN ADULT: ICD-10-CM

## 2023-08-07 DIAGNOSIS — I25.10 CORONARY ARTERY DISEASE, UNSPECIFIED VESSEL OR LESION TYPE, UNSPECIFIED WHETHER ANGINA PRESENT, UNSPECIFIED WHETHER NATIVE OR TRANSPLANTED HEART: ICD-10-CM

## 2023-08-07 DIAGNOSIS — E11.9 TYPE 2 DIABETES MELLITUS WITHOUT COMPLICATION, WITHOUT LONG-TERM CURRENT USE OF INSULIN: Primary | ICD-10-CM

## 2023-08-07 DIAGNOSIS — E78.2 HYPERLIPEMIA, MIXED: ICD-10-CM

## 2023-08-07 PROCEDURE — 99214 OFFICE O/P EST MOD 30 MIN: CPT | Performed by: NURSE PRACTITIONER

## 2023-08-07 RX ORDER — GLIMEPIRIDE 2 MG/1
2 TABLET ORAL EVERY MORNING
Qty: 90 TABLET | Refills: 1 | Status: SHIPPED | OUTPATIENT
Start: 2023-08-07

## 2023-08-07 RX ORDER — CALCIUM CITRATE/VITAMIN D3 200MG-6.25
TABLET ORAL
Qty: 200 EACH | Refills: 11 | Status: SHIPPED | OUTPATIENT
Start: 2023-08-07

## 2023-08-07 RX ORDER — ATORVASTATIN CALCIUM 20 MG/1
20 TABLET, FILM COATED ORAL
Qty: 90 TABLET | Refills: 1 | Status: SHIPPED | OUTPATIENT
Start: 2023-08-07

## 2023-08-07 RX ORDER — SITAGLIPTIN AND METFORMIN HYDROCHLORIDE 1000; 50 MG/1; MG/1
1 TABLET, FILM COATED ORAL 2 TIMES DAILY WITH MEALS
Qty: 180 TABLET | Refills: 1 | Status: SHIPPED | OUTPATIENT
Start: 2023-08-07

## 2023-08-07 RX ORDER — GLUCOSAM/CHON-MSM1/C/MANG/BOSW 500-416.6
TABLET ORAL
Qty: 200 EACH | Refills: 11 | Status: SHIPPED | OUTPATIENT
Start: 2023-08-07

## 2023-08-07 NOTE — PROGRESS NOTES
"Chief Complaint  Diabetes (Testing bs 1 x day last dm eye exam April 2023)    Subjective        Lauri Garcia presents to [facility] ENDOCRINOLOGY  History of Present Illness    Type 2 dm    Diagnosed about 20+ years ago   Today in clinic pt reports being on glimepiride 2 mg po daily with breakfast, jardiance 25 mg po daily, janumet 50-1000mg BID  Checks BG - QAM  Last eye exam - UTD 2023  CAD - yes  Episodes of hypoglycemia - lowest BS 74, asymptomatic    Still walking daily   On statin and ACE     Objective   Vital Signs:  /74   Pulse 82   Temp 96.1 øF (35.6 øC) (Temporal)   Ht 180.3 cm (70.98\")   Wt (!) 150 kg (330 lb 6.4 oz)   SpO2 96%   BMI 46.10 kg/mý   Estimated body mass index is 46.1 kg/mý as calculated from the following:    Height as of this encounter: 180.3 cm (70.98\").    Weight as of this encounter: 150 kg (330 lb 6.4 oz).             Physical Exam  Vitals reviewed.   Constitutional:       General: He is not in acute distress.  HENT:      Head: Normocephalic and atraumatic.   Cardiovascular:      Rate and Rhythm: Normal rate.   Pulmonary:      Effort: Pulmonary effort is normal. No respiratory distress.   Musculoskeletal:         General: No signs of injury. Normal range of motion.      Cervical back: Normal range of motion and neck supple.   Skin:     General: Skin is warm and dry.   Neurological:      Mental Status: He is alert and oriented to person, place, and time. Mental status is at baseline.   Psychiatric:         Mood and Affect: Mood normal.         Behavior: Behavior normal.         Thought Content: Thought content normal.         Judgment: Judgment normal.      Result Review :  The following data was reviewed by: АНДРЕЙ Martínez on 08/07/2023:  Common labs          1/17/2023    12:23 1/23/2023    08:12 7/31/2023    08:17   Common Labs   Glucose  95  101    BUN  15  12    Creatinine  1.01  0.97    Sodium  141  141    Potassium  4.1  4.3    Chloride  103  " 101    Calcium  10.2  10.7    Total Protein   6.3    Albumin   4.2    Total Bilirubin   0.4    Alkaline Phosphatase   68    AST (SGOT)   22    ALT (SGPT)   31    WBC 6.7         Hemoglobin 15.9         Hematocrit 46.0         Platelets 245         Total Cholesterol  100  104    Triglycerides  123  172    HDL Cholesterol  30  29    LDL Cholesterol   48  46    Hemoglobin A1C  6.30  6.40    Microalbumin, Urine   5.0    PSA 0.5            Details          This result is from an external source.                          Assessment and Plan   Diagnoses and all orders for this visit:    1. Type 2 diabetes mellitus without complication, without long-term current use of insulin (Primary)  -     TRUEplus Lancets 33G misc; TEST BLOOD SUGAR TWO TIMES DAILY  Dispense: 200 each; Refill: 11  -     glucose blood (True Metrix Blood Glucose Test) test strip; TEST BLOOD SUGAR TWO TIMES DAILY  Dispense: 200 each; Refill: 11  -     Hemoglobin A1c; Future  -     Basic Metabolic Panel; Future    2. Hyperlipemia, mixed  -     Hemoglobin A1c; Future  -     Basic Metabolic Panel; Future    3. Coronary artery disease, unspecified vessel or lesion type, unspecified whether angina present, unspecified whether native or transplanted heart    4. Class 3 severe obesity due to excess calories with serious comorbidity and body mass index (BMI) of 45.0 to 49.9 in adult    Other orders  -     empagliflozin (Jardiance) 25 MG tablet tablet; Take 1 tablet by mouth Daily.  Dispense: 90 tablet; Refill: 1  -     sitaGLIPtin-metFORMIN (Janumet)  MG per tablet; Take 1 tablet by mouth 2 (Two) Times a Day With Meals.  Dispense: 180 tablet; Refill: 1  -     glimepiride (AMARYL) 2 MG tablet; Take 1 tablet by mouth Every Morning.  Dispense: 90 tablet; Refill: 1  -     atorvastatin (LIPITOR) 20 MG tablet; Take 1 tablet by mouth every night at bedtime.  Dispense: 90 tablet; Refill: 1             Follow Up   Return in about 6 months (around 2/7/2024).    A1c  at goal, continue regimen as above  Medication use reviewed with patient   Reviewed low triglyceride diet, LDL at goal on statin   Increased hydration on jardiance in summer heat with mildly elevated calcium levels      Patient was given instructions and counseling regarding his condition or for health maintenance advice. Please see specific information pulled into the AVS if appropriate.     АНДРЕЙ Martínez

## 2023-08-07 NOTE — PROGRESS NOTES
Benefits Investigation Summary    Prescription: Renewal     Dispensing pharmacy: [facility]    Copay amount:  Janumet  mg: $120/84 days    BIN: [id number]  PCN: [id number]  RX GROUP: IVONNE Campos, PharmD, MM   Clinical Speciality Pharmacist, Endocrinology   8/7/2023  08:38 EDT

## 2023-11-13 RX ORDER — GLIMEPIRIDE 2 MG/1
2 TABLET ORAL EVERY MORNING
Qty: 90 TABLET | Refills: 1 | Status: SHIPPED | OUTPATIENT
Start: 2023-11-13

## 2023-11-13 RX ORDER — ATORVASTATIN CALCIUM 20 MG/1
20 TABLET, FILM COATED ORAL
Qty: 90 TABLET | Refills: 1 | Status: SHIPPED | OUTPATIENT
Start: 2023-11-13

## 2023-11-13 RX ORDER — EMPAGLIFLOZIN 25 MG/1
25 TABLET, FILM COATED ORAL DAILY
Qty: 90 TABLET | Refills: 1 | Status: SHIPPED | OUTPATIENT
Start: 2023-11-13

## 2023-11-27 RX ORDER — SITAGLIPTIN AND METFORMIN HYDROCHLORIDE 1000; 50 MG/1; MG/1
1 TABLET, FILM COATED ORAL 2 TIMES DAILY WITH MEALS
Qty: 180 TABLET | Refills: 0 | Status: SHIPPED | OUTPATIENT
Start: 2023-11-27

## 2023-11-27 NOTE — TELEPHONE ENCOUNTER
Rx Refill Note  Requested Prescriptions     Pending Prescriptions Disp Refills    Janumet  MG per tablet [Pharmacy Med Name: Janumet  MG Oral Tablet] 180 tablet 3     Sig: TAKE 1 TABLET BY MOUTH TWICE  DAILY WITH MEALS      Last office visit with prescribing clinician: 8/7/2023   Last telemedicine visit with prescribing clinician: Visit date not found   Next office visit with prescribing clinician: 2/7/2024                         Would you like a call back once the refill request has been completed: [] Yes [] No    If the office needs to give you a call back, can they leave a voicemail: [] Yes [] No    Mercedes Doan  11/27/23, 09:44 EST

## 2024-01-22 ENCOUNTER — PRIOR AUTHORIZATION (OUTPATIENT)
Dept: ENDOCRINOLOGY | Age: 59
End: 2024-01-22
Payer: COMMERCIAL

## 2024-01-26 ENCOUNTER — TELEPHONE (OUTPATIENT)
Dept: ENDOCRINOLOGY | Age: 59
End: 2024-01-26
Payer: COMMERCIAL

## 2024-01-26 DIAGNOSIS — E78.2 HYPERLIPEMIA, MIXED: ICD-10-CM

## 2024-01-26 DIAGNOSIS — E11.9 TYPE 2 DIABETES MELLITUS WITHOUT COMPLICATION, WITHOUT LONG-TERM CURRENT USE OF INSULIN: ICD-10-CM

## 2024-01-26 NOTE — TELEPHONE ENCOUNTER
Called and sw pt. I informed the pt he will still have to complete the labs since we can see the labs he completed at his PCP.

## 2024-01-26 NOTE — TELEPHONE ENCOUNTER
Provider: LUL CHEW    Caller: ESA LEE    Relationship to Patient: SELF    Reason for Call: PATIENT STATES HIS PCP DR HODGES DID LAB WORK ON HIM ON 1/22/24. SHE MAY HAVE DONE LABS THAT LUL HAS ORDERED FOR HIM ALREADY. PLEASE CALL PATIENT REGARDING THE LABS HE NEEDS DONE ON 1/31/24

## 2024-01-31 ENCOUNTER — HOSPITAL ENCOUNTER (OUTPATIENT)
Dept: CARDIOLOGY | Facility: HOSPITAL | Age: 59
Discharge: HOME | End: 2024-01-31
Admitting: NURSE PRACTITIONER
Payer: COMMERCIAL

## 2024-01-31 ENCOUNTER — OFFICE VISIT (OUTPATIENT)
Dept: CARDIOLOGY | Facility: CLINIC | Age: 59
End: 2024-01-31
Payer: COMMERCIAL

## 2024-01-31 VITALS
DIASTOLIC BLOOD PRESSURE: 58 MMHG | BODY MASS INDEX: 44.1 KG/M2 | SYSTOLIC BLOOD PRESSURE: 120 MMHG | WEIGHT: 315 LBS | HEIGHT: 71 IN | OXYGEN SATURATION: 97 % | HEART RATE: 80 BPM

## 2024-01-31 VITALS
HEIGHT: 71 IN | DIASTOLIC BLOOD PRESSURE: 58 MMHG | WEIGHT: 315 LBS | HEART RATE: 80 BPM | BODY MASS INDEX: 44.1 KG/M2 | SYSTOLIC BLOOD PRESSURE: 120 MMHG

## 2024-01-31 DIAGNOSIS — I25.5 ISCHEMIC CARDIOMYOPATHY: ICD-10-CM

## 2024-01-31 DIAGNOSIS — I10 PRIMARY HYPERTENSION: ICD-10-CM

## 2024-01-31 DIAGNOSIS — I25.10 CORONARY ARTERY DISEASE INVOLVING NATIVE CORONARY ARTERY OF NATIVE HEART WITHOUT ANGINA PECTORIS: Primary | ICD-10-CM

## 2024-01-31 LAB
AORTIC ARCH: 2.6 CM
ASCENDING AORTA: 4.1 CM
BH CV ECHO MEAS - ACS: 2.7 CM
BH CV ECHO MEAS - AO MAX PG: 6.5 MMHG
BH CV ECHO MEAS - AO MEAN PG: 4 MMHG
BH CV ECHO MEAS - AO ROOT DIAM: 4.3 CM
BH CV ECHO MEAS - AO V2 MAX: 127 CM/SEC
BH CV ECHO MEAS - AO V2 VTI: 24.3 CM
BH CV ECHO MEAS - AVA(I,D): 3.1 CM2
BH CV ECHO MEAS - EDV(CUBED): 287.5 ML
BH CV ECHO MEAS - EDV(MOD-SP2): 218 ML
BH CV ECHO MEAS - EDV(MOD-SP4): 239 ML
BH CV ECHO MEAS - EF(MOD-BP): 44.5 %
BH CV ECHO MEAS - EF(MOD-SP2): 48.6 %
BH CV ECHO MEAS - EF(MOD-SP4): 40.2 %
BH CV ECHO MEAS - ESV(CUBED): 140.6 ML
BH CV ECHO MEAS - ESV(MOD-SP2): 112 ML
BH CV ECHO MEAS - ESV(MOD-SP4): 143 ML
BH CV ECHO MEAS - FS: 21.2 %
BH CV ECHO MEAS - IVS/LVPW: 1.3 CM
BH CV ECHO MEAS - IVSD: 1.3 CM
BH CV ECHO MEAS - LAT PEAK E' VEL: 8.6 CM/SEC
BH CV ECHO MEAS - LV DIASTOLIC VOL/BSA (35-75): 91.6 CM2
BH CV ECHO MEAS - LV MASS(C)D: 347.9 GRAMS
BH CV ECHO MEAS - LV MAX PG: 3.4 MMHG
BH CV ECHO MEAS - LV MEAN PG: 2 MMHG
BH CV ECHO MEAS - LV SYSTOLIC VOL/BSA (12-30): 54.8 CM2
BH CV ECHO MEAS - LV V1 MAX: 92.5 CM/SEC
BH CV ECHO MEAS - LV V1 VTI: 15.3 CM
BH CV ECHO MEAS - LVIDD: 6.6 CM
BH CV ECHO MEAS - LVIDS: 5.2 CM
BH CV ECHO MEAS - LVOT AREA: 4.9 CM2
BH CV ECHO MEAS - LVOT DIAM: 2.5 CM
BH CV ECHO MEAS - LVPWD: 1 CM
BH CV ECHO MEAS - MED PEAK E' VEL: 8.1 CM/SEC
BH CV ECHO MEAS - MR MAX PG: 66.9 MMHG
BH CV ECHO MEAS - MR MAX VEL: 409 CM/SEC
BH CV ECHO MEAS - MV A DUR: 0.11 SEC
BH CV ECHO MEAS - MV A MAX VEL: 69.8 CM/SEC
BH CV ECHO MEAS - MV DEC SLOPE: 280 CM/SEC2
BH CV ECHO MEAS - MV DEC TIME: 0.26 SEC
BH CV ECHO MEAS - MV E MAX VEL: 48.4 CM/SEC
BH CV ECHO MEAS - MV E/A: 0.69
BH CV ECHO MEAS - MV MAX PG: 3 MMHG
BH CV ECHO MEAS - MV MEAN PG: 1 MMHG
BH CV ECHO MEAS - MV P1/2T: 56.8 MSEC
BH CV ECHO MEAS - MV V2 VTI: 18.1 CM
BH CV ECHO MEAS - MVA(P1/2T): 3.9 CM2
BH CV ECHO MEAS - MVA(VTI): 4.1 CM2
BH CV ECHO MEAS - PA ACC TIME: 0.11 SEC
BH CV ECHO MEAS - PA V2 MAX: 90.2 CM/SEC
BH CV ECHO MEAS - PULM A REVS DUR: 0.08 SEC
BH CV ECHO MEAS - PULM A REVS VEL: 26.6 CM/SEC
BH CV ECHO MEAS - PULM DIAS VEL: 39 CM/SEC
BH CV ECHO MEAS - PULM S/D: 0.91
BH CV ECHO MEAS - PULM SYS VEL: 35.6 CM/SEC
BH CV ECHO MEAS - QP/QS: 0.7
BH CV ECHO MEAS - RV MAX PG: 2.02 MMHG
BH CV ECHO MEAS - RV V1 MAX: 71.1 CM/SEC
BH CV ECHO MEAS - RV V1 VTI: 13.9 CM
BH CV ECHO MEAS - RVOT DIAM: 2.2 CM
BH CV ECHO MEAS - RVSP: 23 MMHG
BH CV ECHO MEAS - SI(MOD-SP2): 40.6 ML/M2
BH CV ECHO MEAS - SI(MOD-SP4): 36.8 ML/M2
BH CV ECHO MEAS - SUP REN AO DIAM: 2.6 CM
BH CV ECHO MEAS - SV(LVOT): 75.1 ML
BH CV ECHO MEAS - SV(MOD-SP2): 106 ML
BH CV ECHO MEAS - SV(MOD-SP4): 96 ML
BH CV ECHO MEAS - SV(RVOT): 52.8 ML
BH CV ECHO MEAS - TAPSE (>1.6): 1.8 CM
BH CV ECHO MEAS - TR MAX PG: 17.6 MMHG
BH CV ECHO MEAS - TR MAX VEL: 210 CM/SEC
BH CV ECHO MEASUREMENTS AVERAGE E/E' RATIO: 5.8
BH CV XLRA - RV BASE: 4.1 CM
BH CV XLRA - RV LENGTH: 7.5 CM
BH CV XLRA - RV MID: 2.9 CM
BH CV XLRA - TDI S': 10.8 CM/SEC
LEFT ATRIUM VOLUME INDEX: 25.1 ML/M2
SINUS: 4.1 CM
STJ: 3.2 CM

## 2024-01-31 PROCEDURE — 93306 TTE W/DOPPLER COMPLETE: CPT

## 2024-01-31 PROCEDURE — 99214 OFFICE O/P EST MOD 30 MIN: CPT | Performed by: INTERNAL MEDICINE

## 2024-01-31 PROCEDURE — 25510000001 PERFLUTREN (DEFINITY) 8.476 MG IN SODIUM CHLORIDE (PF) 0.9 % 10 ML INJECTION: Performed by: NURSE PRACTITIONER

## 2024-01-31 RX ADMIN — PERFLUTREN 1.5 ML: 6.52 INJECTION, SUSPENSION INTRAVENOUS at 13:06

## 2024-02-01 LAB
BUN SERPL-MCNC: 15 MG/DL (ref 6–20)
BUN/CREAT SERPL: 13.9 (ref 7–25)
CALCIUM SERPL-MCNC: 9.9 MG/DL (ref 8.6–10.5)
CHLORIDE SERPL-SCNC: 102 MMOL/L (ref 98–107)
CO2 SERPL-SCNC: 30.1 MMOL/L (ref 22–29)
CREAT SERPL-MCNC: 1.08 MG/DL (ref 0.76–1.27)
EGFRCR SERPLBLD CKD-EPI 2021: 80.5 ML/MIN/1.73
GLUCOSE SERPL-MCNC: 114 MG/DL (ref 65–99)
HBA1C MFR BLD: 6.6 % (ref 4.8–5.6)
POTASSIUM SERPL-SCNC: 4.3 MMOL/L (ref 3.5–5.2)
SODIUM SERPL-SCNC: 142 MMOL/L (ref 136–145)

## 2024-02-01 NOTE — PROGRESS NOTES
"      CARDIOLOGY    Charles Celaya MD    ENCOUNTER DATE:  01/31/2024    Lauri Garcia / 58 y.o. / male        CHIEF COMPLAINT / REASON FOR OFFICE VISIT     Cardiomyopathy (06/27/2023 Follow up)  Coronary disease  Hypertension    HISTORY OF PRESENT ILLNESS       Cardiomyopathy      Lauri Garcia is a 58 y.o. male who presents today for reevaluation.  Overall patient is doing very well.  His echo was repeated today and his ejection fraction continues to improve.  He is exercising and I encouraged him to exercise more.  We did talk about the quality of exercise.  Overall I am very excited about his progress he looks great and says he feels good.      The following portions of the patient's history were reviewed and updated as appropriate: allergies, current medications, past family history, past medical history, past social history, past surgical history and problem list.      VITAL SIGNS     Visit Vitals  /58 (BP Location: Left arm)   Pulse 80   Ht 180.3 cm (71\")   Wt (!) 151 kg (333 lb)   BMI 46.44 kg/m²         Wt Readings from Last 3 Encounters:   01/31/24 (!) 150 kg (330 lb)   01/31/24 (!) 151 kg (333 lb)   08/07/23 (!) 150 kg (330 lb 6.4 oz)     Body mass index is 46.44 kg/m².      REVIEW OF SYSTEMS   ROS        PHYSICAL EXAMINATION     Vitals reviewed.   Constitutional:       Appearance: Healthy appearance.   Pulmonary:      Breath sounds: Normal breath sounds.   Cardiovascular:      Normal rate. Regular rhythm. Normal S1. Normal S2.       Murmurs: There is no murmur.      No gallop.  No click. No rub.   Pulses:     Intact distal pulses.   Edema:     Peripheral edema absent.   Neurological:      Mental Status: Alert.           REVIEWED DATA     Procedures    Cardiac Procedures:    Interpretation Summary         Left ventricular systolic function is mildly decreased. Calculated left ventricular EF = 44.5%    The left ventricular cavity is moderately dilated.    The following left ventricular wall " segments are hypokinetic: mid anterior, apical anterior, basal anterolateral, mid anterolateral, apical lateral, basal inferolateral, mid inferolateral, apical inferior, mid inferior, apical septal, basal inferoseptal, mid inferoseptal, apex hypokinetic, mid anteroseptal, basal anterior, basal inferior and basal inferoseptal.    Left ventricular diastolic function is consistent with (grade I) impaired relaxation.    Mild mitral valve regurgitation is present.    Mild tricuspid valve regurgitation is present.    Estimated right ventricular systolic pressure from tricuspid regurgitation is normal (<35 mmHg).    Mild dilation of the aortic root is present. The aortic root measures 4.3 cm. Mild dilation of the sinuses of Valsalva is present.     Lipid Panel          7/31/2023    08:17   Lipid Panel   Total Cholesterol 104    Triglycerides 172    HDL Cholesterol 29    VLDL Cholesterol 29    LDL Cholesterol  46          ASSESSMENT & PLAN      Diagnosis Plan   1. Coronary artery disease involving native coronary artery of native heart without angina pectoris        2. Primary hypertension        3. Ischemic cardiomyopathy              SUMMARY/DISCUSSION  Ischemic heart disease.  His ejection fraction has improved to 45%.  Continue current medication overall he is doing well he is near card association class I.  Coronary artery disease asymptomatic.  LDL remains at 46 continue current medications.  Hypertension blood pressure is excellent  Dilated aortic root.  Will keep blood pressure under control and follow in the future.  Continue the same encourage exercise follow-up 1 year sooner if issues.        MEDICATIONS         Discharge Medications            Accurate as of January 31, 2024 11:59 PM. If you have any questions, ask your nurse or doctor.                Continue These Medications        Instructions Start Date   allopurinol 300 MG tablet  Commonly known as: ZYLOPRIM   300 mg, Oral, Every Evening      aspirin 81 MG  tablet   81 mg, Oral, Daily      atorvastatin 20 MG tablet  Commonly known as: LIPITOR   20 mg, Oral, Every Night at Bedtime      cetirizine 10 MG tablet  Commonly known as: zyrTEC   10 mg, Oral, Daily      fluticasone 50 MCG/ACT nasal spray  Commonly known as: FLONASE   2 sprays, Nasal, Daily PRN      glimepiride 2 MG tablet  Commonly known as: AMARYL   2 mg, Oral, Every Morning      Janumet  MG per tablet  Generic drug: sitaGLIPtin-metFORMIN   1 tablet, Oral, 2 Times Daily With Meals      Jardiance 25 MG tablet tablet  Generic drug: empagliflozin   25 mg, Oral, Daily      Klor-Con/EF 25 MEQ disintegrating tablet  Generic drug: potassium bicarbonate   50 mEq, Oral, Daily      lisinopril-hydrochlorothiazide 20-12.5 MG per tablet  Commonly known as: Zestoretic   2 tablets, Oral, Daily      LORazepam 1 MG tablet  Commonly known as: ATIVAN   No dose, route, or frequency recorded.      melatonin 5 MG tablet tablet   5 mg, Oral, Nightly      metoprolol succinate XL 50 MG 24 hr tablet  Commonly known as: TOPROL-XL   50 mg, Oral, Daily      montelukast 10 MG tablet  Commonly known as: SINGULAIR   10 mg, Oral, Nightly      multivitamin with minerals tablet tablet   1 tablet, Oral, Daily      nitroglycerin 0.4 MG SL tablet  Commonly known as: NITROSTAT   DISSOLVE 1 TABLET UNDER THE TONGUE AS NEEDED FOR CHEST PAIN MAY REPEAT  EVERY  5  MINUTES FOR UP TO 3 DOSES      traZODone 50 MG tablet  Commonly known as: DESYREL   50 mg, Take 1-2 tablet daily for sleep      True Metrix Air Glucose Meter device   1 Device, Does not apply, Daily      True Metrix Blood Glucose Test test strip  Generic drug: glucose blood   TEST BLOOD SUGAR TWO TIMES DAILY      TRUEplus Lancets 33G misc   TEST BLOOD SUGAR TWO TIMES DAILY                   **Dragon Disclaimer:   Much of this encounter note is an electronic transcription/translation of spoken language to printed text. The electronic translation of spoken language may permit erroneous, or  at times, nonsensical words or phrases to be inadvertently transcribed. Although I have reviewed the note for such errors, some may still exist.

## 2024-02-07 ENCOUNTER — OFFICE VISIT (OUTPATIENT)
Dept: ENDOCRINOLOGY | Age: 59
End: 2024-02-07
Payer: COMMERCIAL

## 2024-02-07 ENCOUNTER — SPECIALTY PHARMACY (OUTPATIENT)
Dept: ENDOCRINOLOGY | Age: 59
End: 2024-02-07
Payer: COMMERCIAL

## 2024-02-07 VITALS
WEIGHT: 315 LBS | DIASTOLIC BLOOD PRESSURE: 80 MMHG | HEART RATE: 92 BPM | SYSTOLIC BLOOD PRESSURE: 132 MMHG | TEMPERATURE: 96.8 F | BODY MASS INDEX: 46.42 KG/M2 | OXYGEN SATURATION: 98 %

## 2024-02-07 DIAGNOSIS — E66.01 CLASS 3 SEVERE OBESITY DUE TO EXCESS CALORIES WITH SERIOUS COMORBIDITY AND BODY MASS INDEX (BMI) OF 45.0 TO 49.9 IN ADULT: ICD-10-CM

## 2024-02-07 DIAGNOSIS — E11.65 TYPE 2 DIABETES MELLITUS WITH HYPERGLYCEMIA, WITHOUT LONG-TERM CURRENT USE OF INSULIN: Primary | ICD-10-CM

## 2024-02-07 DIAGNOSIS — E78.2 HYPERLIPEMIA, MIXED: ICD-10-CM

## 2024-02-07 DIAGNOSIS — I25.10 CORONARY ARTERY DISEASE, UNSPECIFIED VESSEL OR LESION TYPE, UNSPECIFIED WHETHER ANGINA PRESENT, UNSPECIFIED WHETHER NATIVE OR TRANSPLANTED HEART: ICD-10-CM

## 2024-02-07 PROCEDURE — 99214 OFFICE O/P EST MOD 30 MIN: CPT | Performed by: NURSE PRACTITIONER

## 2024-02-07 NOTE — PROGRESS NOTES
Specialty Pharmacy Patient Management Program  Endocrinology Introduction to Services Outreach     Lauri Garcia is a 58 y.o. male seen by an Endocrinology provider for Type 2 Diabetes. This was an initial visit to introduce Endocrinology Patient Management Program and Specialty Pharmacy services offered by [facility], as the patient is taking specialty medication Janumet and Jardiance.     Lauri Garcia DECLINED to fill specialty medication at [facility] and/or enrollment in the Endocrine Disorders Patient Management Program at this time.  Patient is eligible for services in the future if desired.     Patient declined as patient is not able to receive delivery due to insurance restrictions.     Mariana Campos, PharmD, MM   Clinical Speciality Pharmacist, Endocrinology   2/7/2024  09:12 EST

## 2024-02-07 NOTE — PROGRESS NOTES
"Chief Complaint  Diabetes (Checks bs every day.)    Subjective        Lauri Garcia presents to [facility] ENDOCRINOLOGY  History of Present Illness    Type 2 dm > 20 years   DM regimen: glimepiride 2 mg po daily with breakfast, jardiance 25 mg po daily, janumet 50-1000mg BID  Checks BG - QAM  Last eye exam - 5/2023  (+)CAD  Episodes of hypoglycemia - denies   Trying to stay active 30min x 5 days   On statin nightly and ACE    Objective   Vital Signs:  /80   Pulse 92   Temp 96.8 °F (36 °C) (Temporal)   Wt (!) 151 kg (332 lb 12.8 oz)   SpO2 98%   BMI 46.42 kg/m²   Estimated body mass index is 46.42 kg/m² as calculated from the following:    Height as of 1/31/24: 180.3 cm (71\").    Weight as of this encounter: 151 kg (332 lb 12.8 oz).             Physical Exam  Vitals reviewed.   Constitutional:       General: He is not in acute distress.  HENT:      Head: Normocephalic and atraumatic.   Cardiovascular:      Rate and Rhythm: Normal rate.   Pulmonary:      Effort: Pulmonary effort is normal. No respiratory distress.   Musculoskeletal:         General: No signs of injury. Normal range of motion.      Cervical back: Normal range of motion and neck supple.   Skin:     General: Skin is warm and dry.   Neurological:      Mental Status: He is alert and oriented to person, place, and time. Mental status is at baseline.   Psychiatric:         Mood and Affect: Mood normal.         Behavior: Behavior normal.         Thought Content: Thought content normal.         Judgment: Judgment normal.          Result Review :    The following data was reviewed by: АНДРЕЙ Martínez on 02/07/2024:  Common labs          7/31/2023    08:17 1/22/2024    09:22 1/31/2024    08:15   Common Labs   Glucose 101   114    BUN 12   15    Creatinine 0.97   1.08    Sodium 141   142    Potassium 4.3   4.3    Chloride 101   102    Calcium 10.7   9.9    Total Protein 6.3      Albumin 4.2      Total Bilirubin 0.4    "   Alkaline Phosphatase 68      AST (SGOT) 22      ALT (SGPT) 31      Total Cholesterol 104      Triglycerides 172      HDL Cholesterol 29      LDL Cholesterol  46      Hemoglobin A1C 6.40   6.60    Microalbumin, Urine 5.0      PSA  0.44           Details          This result is from an external source.                          Assessment and Plan     Diagnoses and all orders for this visit:    1. Type 2 diabetes mellitus with hyperglycemia, without long-term current use of insulin (Primary)    2. Coronary artery disease, unspecified vessel or lesion type, unspecified whether angina present, unspecified whether native or transplanted heart    3. Class 3 severe obesity due to excess calories with serious comorbidity and body mass index (BMI) of 45.0 to 49.9 in adult    4. Hyperlipemia, mixed             Follow Up     Return in about 6 months (around 8/7/2024).    A1c at goal, continue diabetic regimen as above   Repeat labs before next visit  No additional changes made today  Savings card given for jardiance     Patient was given instructions and counseling regarding his condition or for health maintenance advice. Please see specific information pulled into the AVS if appropriate.     Velia Dias APRN

## 2024-04-04 RX ORDER — SITAGLIPTIN AND METFORMIN HYDROCHLORIDE 1000; 50 MG/1; MG/1
1 TABLET, FILM COATED ORAL 2 TIMES DAILY WITH MEALS
Qty: 180 TABLET | Refills: 1 | Status: SHIPPED | OUTPATIENT
Start: 2024-04-04

## 2024-04-04 NOTE — TELEPHONE ENCOUNTER
Rx Refill Note  Requested Prescriptions     Pending Prescriptions Disp Refills    Janumet  MG per tablet [Pharmacy Med Name: Janumet  MG Oral Tablet] 180 tablet 3     Sig: TAKE 1 TABLET BY MOUTH TWICE  DAILY WITH MEALS      Last office visit with prescribing clinician: 2/7/2024   Last telemedicine visit with prescribing clinician: Visit date not found   Next office visit with prescribing clinician: 8/7/2024                         Would you like a call back once the refill request has been completed: [] Yes [] No    If the office needs to give you a call back, can they leave a voicemail: [] Yes [] No    Mercedes Doan  04/04/24, 08:19 EDT

## 2024-07-11 RX ORDER — ATORVASTATIN CALCIUM 20 MG/1
20 TABLET, FILM COATED ORAL
Qty: 90 TABLET | Refills: 3 | Status: SHIPPED | OUTPATIENT
Start: 2024-07-11

## 2024-07-11 RX ORDER — GLIMEPIRIDE 2 MG/1
2 TABLET ORAL EVERY MORNING
Qty: 90 TABLET | Refills: 3 | Status: SHIPPED | OUTPATIENT
Start: 2024-07-11

## 2024-07-11 NOTE — TELEPHONE ENCOUNTER
Rx Refill Note  Requested Prescriptions     Pending Prescriptions Disp Refills    atorvastatin (LIPITOR) 20 MG tablet [Pharmacy Med Name: Atorvastatin Calcium 20 MG Oral Tablet] 90 tablet 3     Sig: TAKE 1 TABLET BY MOUTH EVERY  NIGHT AT BEDTIME    glimepiride (AMARYL) 2 MG tablet [Pharmacy Med Name: Glimepiride 2 MG Oral Tablet] 90 tablet 3     Sig: TAKE 1 TABLET BY MOUTH EVERY  MORNING      Last office visit with prescribing clinician: 2/7/2024   Last telemedicine visit with prescribing clinician: Visit date not found   Next office visit with prescribing clinician: 8/7/2024                         Would you like a call back once the refill request has been completed: [] Yes [] No    If the office needs to give you a call back, can they leave a voicemail: [] Yes [] No    Mercedes Doan  07/11/24, 07:47 EDT

## 2024-07-12 RX ORDER — EMPAGLIFLOZIN 25 MG/1
25 TABLET, FILM COATED ORAL DAILY
Qty: 90 TABLET | Refills: 3 | Status: SHIPPED | OUTPATIENT
Start: 2024-07-12

## 2024-07-12 NOTE — TELEPHONE ENCOUNTER
Rx Refill Note  Requested Prescriptions     Pending Prescriptions Disp Refills    Jardiance 25 MG tablet tablet [Pharmacy Med Name: Jardiance 25 MG Oral Tablet] 90 tablet 3     Sig: TAKE 1 TABLET BY MOUTH DAILY      Last office visit with prescribing clinician: 2/7/2024   Last telemedicine visit with prescribing clinician: Visit date not found   Next office visit with prescribing clinician: 8/7/2024                         Would you like a call back once the refill request has been completed: [] Yes [] No    If the office needs to give you a call back, can they leave a voicemail: [] Yes [] No    Surekha Miranda MA  07/12/24, 08:14 EDT

## 2024-07-30 DIAGNOSIS — E11.65 TYPE 2 DIABETES MELLITUS WITH HYPERGLYCEMIA, WITHOUT LONG-TERM CURRENT USE OF INSULIN: Primary | ICD-10-CM

## 2024-07-31 DIAGNOSIS — E11.65 TYPE 2 DIABETES MELLITUS WITH HYPERGLYCEMIA, WITHOUT LONG-TERM CURRENT USE OF INSULIN: ICD-10-CM

## 2024-08-01 LAB
ALBUMIN SERPL-MCNC: 4.3 G/DL (ref 3.8–4.9)
ALBUMIN/CREAT UR: <3 MG/G CREAT (ref 0–29)
ALP SERPL-CCNC: 76 IU/L (ref 44–121)
ALT SERPL-CCNC: 44 IU/L (ref 0–44)
AST SERPL-CCNC: 34 IU/L (ref 0–40)
BILIRUB SERPL-MCNC: 0.5 MG/DL (ref 0–1.2)
BUN SERPL-MCNC: 18 MG/DL (ref 6–24)
BUN/CREAT SERPL: 20 (ref 9–20)
CALCIUM SERPL-MCNC: 10.1 MG/DL (ref 8.7–10.2)
CHLORIDE SERPL-SCNC: 100 MMOL/L (ref 96–106)
CHOLEST SERPL-MCNC: 115 MG/DL (ref 100–199)
CO2 SERPL-SCNC: 26 MMOL/L (ref 20–29)
CREAT SERPL-MCNC: 0.92 MG/DL (ref 0.76–1.27)
CREAT UR-MCNC: 117.9 MG/DL
EGFRCR SERPLBLD CKD-EPI 2021: 97 ML/MIN/1.73
GLOBULIN SER CALC-MCNC: 2.6 G/DL (ref 1.5–4.5)
GLUCOSE SERPL-MCNC: 113 MG/DL (ref 70–99)
HBA1C MFR BLD: 7 % (ref 4.8–5.6)
HDLC SERPL-MCNC: 28 MG/DL
IMP & REVIEW OF LAB RESULTS: NORMAL
LDLC SERPL CALC-MCNC: 55 MG/DL (ref 0–99)
MICROALBUMIN UR-MCNC: <3 UG/ML
POTASSIUM SERPL-SCNC: 4.4 MMOL/L (ref 3.5–5.2)
PROT SERPL-MCNC: 6.9 G/DL (ref 6–8.5)
SODIUM SERPL-SCNC: 142 MMOL/L (ref 134–144)
TRIGL SERPL-MCNC: 196 MG/DL (ref 0–149)
VLDLC SERPL CALC-MCNC: 32 MG/DL (ref 5–40)

## 2024-08-07 ENCOUNTER — OFFICE VISIT (OUTPATIENT)
Dept: ENDOCRINOLOGY | Age: 59
End: 2024-08-07
Payer: COMMERCIAL

## 2024-08-07 VITALS
SYSTOLIC BLOOD PRESSURE: 134 MMHG | DIASTOLIC BLOOD PRESSURE: 90 MMHG | HEIGHT: 71 IN | OXYGEN SATURATION: 95 % | TEMPERATURE: 98.4 F | HEART RATE: 90 BPM | BODY MASS INDEX: 44.1 KG/M2 | WEIGHT: 315 LBS

## 2024-08-07 DIAGNOSIS — I25.10 CORONARY ARTERY DISEASE INVOLVING NATIVE CORONARY ARTERY OF NATIVE HEART WITHOUT ANGINA PECTORIS: ICD-10-CM

## 2024-08-07 DIAGNOSIS — E66.01 CLASS 3 SEVERE OBESITY DUE TO EXCESS CALORIES WITH SERIOUS COMORBIDITY AND BODY MASS INDEX (BMI) OF 45.0 TO 49.9 IN ADULT: ICD-10-CM

## 2024-08-07 DIAGNOSIS — E78.2 HYPERLIPEMIA, MIXED: ICD-10-CM

## 2024-08-07 DIAGNOSIS — E11.65 TYPE 2 DIABETES MELLITUS WITH HYPERGLYCEMIA, WITHOUT LONG-TERM CURRENT USE OF INSULIN: Primary | ICD-10-CM

## 2024-08-07 DIAGNOSIS — Z95.1 S/P CABG X 4: ICD-10-CM

## 2024-08-07 PROCEDURE — 99214 OFFICE O/P EST MOD 30 MIN: CPT | Performed by: NURSE PRACTITIONER

## 2024-08-07 NOTE — PROGRESS NOTES
"Chief Complaint  Diabetes    Subjective        Lauri Garcia presents to [facility] ENDOCRINOLOGY  History of Present Illness    Type 2 dm > 20 years   DM regimen: glimepiride 2 mg po daily with breakfast, jardiance 25 mg po daily, janumet 50-1000mg BID  AVG BS in the am around 120-<160  Last eye exam: 6/2024  Known complications: CAD  Episodes of hypoglycemia: none reported   CV: atorvastatin   Renal: lisinopril     Objective   Vital Signs:  /90   Pulse 90   Temp 98.4 °F (36.9 °C) (Oral)   Ht 180.3 cm (70.98\")   Wt (!) 155 kg (340 lb 12.8 oz)   SpO2 95%   BMI 47.55 kg/m²   Estimated body mass index is 47.55 kg/m² as calculated from the following:    Height as of this encounter: 180.3 cm (70.98\").    Weight as of this encounter: 155 kg (340 lb 12.8 oz).             Physical Exam  Vitals reviewed.   Constitutional:       General: He is not in acute distress.  HENT:      Head: Normocephalic and atraumatic.   Cardiovascular:      Rate and Rhythm: Normal rate.   Pulmonary:      Effort: Pulmonary effort is normal. No respiratory distress.   Musculoskeletal:         General: No signs of injury. Normal range of motion.      Cervical back: Normal range of motion and neck supple.   Skin:     General: Skin is warm and dry.   Neurological:      Mental Status: He is alert and oriented to person, place, and time. Mental status is at baseline.   Psychiatric:         Mood and Affect: Mood normal.         Behavior: Behavior normal.         Thought Content: Thought content normal.         Judgment: Judgment normal.        Result Review :    The following data was reviewed by: АНДРЕЙ Martínez on 08/07/2024:  Common labs          1/22/2024    09:22 1/31/2024    08:15 7/31/2024    08:18   Common Labs   Glucose  114  113    BUN  15  18    Creatinine  1.08  0.92    Sodium  142  142    Potassium  4.3  4.4    Chloride  102  100    Calcium  9.9  10.1    Total Protein   6.9    Albumin   4.3    Total " Bilirubin   0.5    Alkaline Phosphatase   76    AST (SGOT)   34    ALT (SGPT)   44    Total Cholesterol   115    Triglycerides   196    HDL Cholesterol   28    LDL Cholesterol    55    Hemoglobin A1C  6.60  7.0    Microalbumin, Urine   <3.0    PSA 0.44            Details          This result is from an external source.                          Assessment and Plan     Diagnoses and all orders for this visit:    1. Type 2 diabetes mellitus with hyperglycemia, without long-term current use of insulin (Primary)    2. Coronary artery disease involving native coronary artery of native heart without angina pectoris    3. S/P CABG x 4    4. Hyperlipemia, mixed    5. Class 3 severe obesity due to excess calories with serious comorbidity and body mass index (BMI) of 45.0 to 49.9 in adult             Follow Up     Return in about 6 months (around 2/7/2025).    A1c slightly higher, still favorable, he thinks it should get better now that he is walking more following a sprained ankle  Continue DM regimen as above  Continue statin and ace-I  A1c at goal   LDL at goal     Patient was given instructions and counseling regarding his condition or for health maintenance advice. Please see specific information pulled into the AVS if appropriate.     Velia Dias APRN

## 2024-10-07 RX ORDER — SITAGLIPTIN AND METFORMIN HYDROCHLORIDE 1000; 50 MG/1; MG/1
1 TABLET, FILM COATED ORAL 2 TIMES DAILY WITH MEALS
Qty: 180 TABLET | Refills: 1 | Status: SHIPPED | OUTPATIENT
Start: 2024-10-07

## 2024-10-07 NOTE — TELEPHONE ENCOUNTER
Rx Refill Note  Requested Prescriptions     Pending Prescriptions Disp Refills    Janumet  MG per tablet [Pharmacy Med Name: Janumet  MG Oral Tablet] 180 tablet 3     Sig: TAKE 1 TABLET BY MOUTH TWICE  DAILY WITH MEALS      Last office visit with prescribing clinician: 8/7/2024   Last telemedicine visit with prescribing clinician: Visit date not found   Next office visit with prescribing clinician: 2/11/2025                         Would you like a call back once the refill request has been completed: [] Yes [] No    If the office needs to give you a call back, can they leave a voicemail: [] Yes [] No    Surekha Miranda MA  10/07/24, 08:56 EDT

## 2024-10-30 RX ORDER — METOPROLOL SUCCINATE 50 MG/1
50 TABLET, EXTENDED RELEASE ORAL DAILY
Qty: 90 TABLET | Refills: 3 | Status: SHIPPED | OUTPATIENT
Start: 2024-10-30

## 2025-01-28 ENCOUNTER — TELEPHONE (OUTPATIENT)
Dept: ENDOCRINOLOGY | Age: 60
End: 2025-01-28
Payer: COMMERCIAL

## 2025-01-28 ENCOUNTER — PRIOR AUTHORIZATION (OUTPATIENT)
Dept: ENDOCRINOLOGY | Age: 60
End: 2025-01-28
Payer: COMMERCIAL

## 2025-01-28 NOTE — TELEPHONE ENCOUNTER
"Caller: Lauri Garcia \"Andrei\"    Relationship to patient: Self      Best call back number: [phone]    Provider: Velia Dias APRN     Medication PA needed: sitaGLIPtin-metFORMIN (Janumet)  MG per tablet       "

## 2025-01-28 NOTE — TELEPHONE ENCOUNTER
Approved today by [employer] 2017 [employer]  Request Reference Number: [id number]. JANUMET XR TAB  is approved through 01/28/2026. Your patient may now fill this prescription and it will be covered.

## 2025-02-03 DIAGNOSIS — E11.65 TYPE 2 DIABETES MELLITUS WITH HYPERGLYCEMIA, WITHOUT LONG-TERM CURRENT USE OF INSULIN: Primary | ICD-10-CM

## 2025-02-04 ENCOUNTER — TELEPHONE (OUTPATIENT)
Dept: ENDOCRINOLOGY | Age: 60
End: 2025-02-04
Payer: COMMERCIAL

## 2025-02-04 DIAGNOSIS — E11.65 TYPE 2 DIABETES MELLITUS WITH HYPERGLYCEMIA, WITHOUT LONG-TERM CURRENT USE OF INSULIN: ICD-10-CM

## 2025-02-04 LAB
BUN SERPL-MCNC: 19 MG/DL (ref 6–20)
BUN/CREAT SERPL: 20 (ref 7–25)
CALCIUM SERPL-MCNC: 10 MG/DL (ref 8.6–10.5)
CHLORIDE SERPL-SCNC: 100 MMOL/L (ref 98–107)
CO2 SERPL-SCNC: 29.2 MMOL/L (ref 22–29)
CREAT SERPL-MCNC: 0.95 MG/DL (ref 0.76–1.27)
EGFRCR SERPLBLD CKD-EPI 2021: 93.4 ML/MIN/1.73
GLUCOSE SERPL-MCNC: 126 MG/DL (ref 65–99)
HBA1C MFR BLD: 7.1 % (ref 4.8–5.6)
POTASSIUM SERPL-SCNC: 4.3 MMOL/L (ref 3.5–5.2)
SODIUM SERPL-SCNC: 141 MMOL/L (ref 136–145)

## 2025-02-04 RX ORDER — SITAGLIPTIN AND METFORMIN HYDROCHLORIDE 1000; 50 MG/1; MG/1
1 TABLET, FILM COATED ORAL 2 TIMES DAILY WITH MEALS
Qty: 60 TABLET | Refills: 0 | Status: SHIPPED | OUTPATIENT
Start: 2025-02-04

## 2025-02-04 NOTE — TELEPHONE ENCOUNTER
Patient called saying we did a Pa for Janument but we need the Pa for Janument XR. Can we look into this an initiate this if needed? Patient provided number where we call call and switch it      Provider Line: [phone]

## 2025-02-04 NOTE — TELEPHONE ENCOUNTER
Rx Refill Note  Requested Prescriptions     Pending Prescriptions Disp Refills    sitaGLIPtin-metFORMIN (Janumet)  MG per tablet 180 tablet 1     Sig: Take 1 tablet by mouth 2 (Two) Times a Day With Meals.      Last office visit with prescribing clinician: 8/7/2024   Last telemedicine visit with prescribing clinician: Visit date not found   Next office visit with prescribing clinician: 2/11/2025                         Would you like a call back once the refill request has been completed: [] Yes [] No    If the office needs to give you a call back, can they leave a voicemail: [] Yes [] No    Elvia Oquendo MA  02/04/25, 14:41 EST

## 2025-02-07 ENCOUNTER — PRIOR AUTHORIZATION (OUTPATIENT)
Dept: ENDOCRINOLOGY | Age: 60
End: 2025-02-07
Payer: COMMERCIAL

## 2025-02-07 NOTE — TELEPHONE ENCOUNTER
Pa started janumet  mg (Key: [id number])        Appro[facility] on February 7 [facility]  Request Reference Number: [id number]. JANUMET TAB  is approved through 02/07/2026. Your patient may now fill this prescription and it will be covered.

## 2025-02-11 ENCOUNTER — OFFICE VISIT (OUTPATIENT)
Dept: ENDOCRINOLOGY | Age: 60
End: 2025-02-11
Payer: COMMERCIAL

## 2025-02-11 VITALS
WEIGHT: 315 LBS | TEMPERATURE: 98.1 F | SYSTOLIC BLOOD PRESSURE: 132 MMHG | DIASTOLIC BLOOD PRESSURE: 78 MMHG | HEART RATE: 78 BPM | OXYGEN SATURATION: 96 % | HEIGHT: 71 IN | BODY MASS INDEX: 44.1 KG/M2

## 2025-02-11 DIAGNOSIS — E11.65 TYPE 2 DIABETES MELLITUS WITH HYPERGLYCEMIA, WITHOUT LONG-TERM CURRENT USE OF INSULIN: Primary | ICD-10-CM

## 2025-02-11 DIAGNOSIS — E78.2 HYPERLIPEMIA, MIXED: ICD-10-CM

## 2025-02-11 DIAGNOSIS — Z95.1 S/P CABG X 4: ICD-10-CM

## 2025-02-11 DIAGNOSIS — E66.01 CLASS 3 SEVERE OBESITY DUE TO EXCESS CALORIES WITH SERIOUS COMORBIDITY AND BODY MASS INDEX (BMI) OF 45.0 TO 49.9 IN ADULT: ICD-10-CM

## 2025-02-11 DIAGNOSIS — E66.813 CLASS 3 SEVERE OBESITY DUE TO EXCESS CALORIES WITH SERIOUS COMORBIDITY AND BODY MASS INDEX (BMI) OF 45.0 TO 49.9 IN ADULT: ICD-10-CM

## 2025-02-11 DIAGNOSIS — I25.10 CORONARY ARTERY DISEASE INVOLVING NATIVE CORONARY ARTERY OF NATIVE HEART WITHOUT ANGINA PECTORIS: ICD-10-CM

## 2025-02-11 PROCEDURE — 99214 OFFICE O/P EST MOD 30 MIN: CPT | Performed by: NURSE PRACTITIONER

## 2025-02-11 NOTE — PROGRESS NOTES
"Chief Complaint  Diabetes    Subjective        Lauri Garcia presents to [facility] ENDOCRINOLOGY  History of Present Illness    Type 2 dm > 20 years   DM regimen: glimepiride 2 mg po daily with breakfast, jardiance 25 mg po daily, janumet 50-1000mg BID  Last eye exam: 6/2024  Known complications: CAD, sees cards 2/26/25  Episodes of hypoglycemia: denies   CV: atorvastatin 20mg QD  Renal: lisinopril 40mg QD  Has been more sedentary with the winter weather, weight has gone up 10-15 pounds     Objective   Vital Signs:  /78   Pulse 78   Temp 98.1 °F (36.7 °C) (Oral)   Ht 180.3 cm (70.98\")   Wt (!) 160 kg (352 lb 3.2 oz)   SpO2 96%   BMI 49.14 kg/m²   Estimated body mass index is 49.14 kg/m² as calculated from the following:    Height as of this encounter: 180.3 cm (70.98\").    Weight as of this encounter: 160 kg (352 lb 3.2 oz).          Physical Exam  Vitals reviewed.   Constitutional:       General: He is not in acute distress.  HENT:      Head: Normocephalic and atraumatic.   Cardiovascular:      Rate and Rhythm: Normal rate.   Pulmonary:      Effort: Pulmonary effort is normal. No respiratory distress.   Musculoskeletal:         General: No signs of injury. Normal range of motion.      Cervical back: Normal range of motion and neck supple.   Skin:     General: Skin is warm and dry.   Neurological:      Mental Status: He is alert and oriented to person, place, and time. Mental status is at baseline.   Psychiatric:         Mood and Affect: Mood normal.         Behavior: Behavior normal.         Thought Content: Thought content normal.         Judgment: Judgment normal.        Result Review :  The following data was reviewed by: АНДРЕЙ Martínez on 02/11/2025:  Common labs          7/31/2024    08:18 8/7/2024    14:05 2/4/2025    08:04   Common Labs   Glucose 113   126    BUN 18   19    Creatinine 0.92   0.95    Sodium 142   141    Potassium 4.4   4.3    Chloride 100   100    Calcium " 10.1   10.0    Total Protein 6.9      Albumin 4.3      Total Bilirubin 0.5      Alkaline Phosphatase 76      AST (SGOT) 34      ALT (SGPT) 44      WBC  7.7        Hemoglobin  15.3        Hematocrit  46.9        Platelets  239        Total Cholesterol 115      Triglycerides 196      HDL Cholesterol 28      LDL Cholesterol  55      Hemoglobin A1C 7.0   7.10    Microalbumin, Urine <3.0      Uric Acid  3.8           Details          This result is from an external source.                       Assessment and Plan   Diagnoses and all orders for this visit:    1. Type 2 diabetes mellitus with hyperglycemia, without long-term current use of insulin (Primary)  -     Lipid Panel; Future  -     Hemoglobin A1c; Future  -     Comprehensive Metabolic Panel; Future  -     Microalbumin / Creatinine Urine Ratio - Urine, Clean Catch; Future    2. Coronary artery disease involving native coronary artery of native heart without angina pectoris    3. S/P CABG x 4    4. Hyperlipemia, mixed    5. Class 3 severe obesity due to excess calories with serious comorbidity and body mass index (BMI) of 45.0 to 49.9 in adult             Follow Up   Return in about 6 months (around 8/11/2025).    A1c at goal   Continue plan as above, tolerating well without complaints or reports of side effects  Continue statin and ace-I   No additional changes made at this time     Patient was given instructions and counseling regarding his condition or for health maintenance advice. Please see specific information pulled into the AVS if appropriate.       АНДРЕЙ Martínez

## 2025-02-26 ENCOUNTER — OFFICE VISIT (OUTPATIENT)
Dept: CARDIOLOGY | Facility: CLINIC | Age: 60
End: 2025-02-26
Payer: COMMERCIAL

## 2025-02-26 VITALS
HEART RATE: 95 BPM | DIASTOLIC BLOOD PRESSURE: 80 MMHG | HEIGHT: 71 IN | WEIGHT: 315 LBS | SYSTOLIC BLOOD PRESSURE: 132 MMHG | BODY MASS INDEX: 44.1 KG/M2

## 2025-02-26 DIAGNOSIS — I25.10 CORONARY ARTERY DISEASE INVOLVING NATIVE CORONARY ARTERY OF NATIVE HEART WITHOUT ANGINA PECTORIS: ICD-10-CM

## 2025-02-26 DIAGNOSIS — I10 PRIMARY HYPERTENSION: ICD-10-CM

## 2025-02-26 DIAGNOSIS — Z95.1 S/P CABG X 4: Primary | ICD-10-CM

## 2025-02-26 NOTE — PROGRESS NOTES
"      CARDIOLOGY    Charles Celaya MD    ENCOUNTER DATE:  02/26/2025    Lauri Garcia / 59 y.o. / male        CHIEF COMPLAINT / REASON FOR OFFICE VISIT     Coronary Artery Disease (01/31/2024 Follow up)  Hypertension       HISTORY OF PRESENT ILLNESS       Coronary Artery Disease      Lauri Garcia is a 59 y.o. male who presents today for reevaluation.  Clinically he is doing great he has no complaints.  His blood pressure is excellent his LDL is 54 patient does not exercise.      The following portions of the patient's history were reviewed and updated as appropriate: allergies, current medications, past family history, past medical history, past social history, past surgical history and problem list.      VITAL SIGNS     Visit Vitals  /80 (BP Location: Left arm)   Pulse 95   Ht 180.3 cm (71\")   Wt (!) 159 kg (351 lb)   BMI 48.95 kg/m²         Wt Readings from Last 3 Encounters:   02/26/25 (!) 159 kg (351 lb)   02/11/25 (!) 160 kg (352 lb 3.2 oz)   08/07/24 (!) 155 kg (340 lb 12.8 oz)     Body mass index is 48.95 kg/m².      REVIEW OF SYSTEMS   ROS        PHYSICAL EXAMINATION     Vitals reviewed.   Constitutional:       Appearance: Healthy appearance.   Pulmonary:      Effort: Pulmonary effort is normal.   Cardiovascular:      Normal rate. Regular rhythm. Normal S1. Normal S2.       Murmurs: There is no murmur.      No gallop.  No click. No rub.   Pulses:     Intact distal pulses.   Edema:     Peripheral edema absent.   Neurological:      Mental Status: Alert.           REVIEWED DATA       ECG 12 Lead    Date/Time: 2/26/2025 3:13 PM  Performed by: Charles Celaya MD    Authorized by: Charles Celaya MD  Comparison: compared with previous ECG from 6/27/2023  Similar to previous ECG  Rhythm: sinus rhythm  Conduction: non-specific intraventricular conduction delay  Q waves: II, III and aVF      Clinical impression: abnormal EKG          Cardiac Procedures:      Lipid Panel          7/31/2024    " 08:18   Lipid Panel   Total Cholesterol 115    Triglycerides 196    HDL Cholesterol 28    VLDL Cholesterol 32    LDL Cholesterol  55          ASSESSMENT & PLAN      Diagnosis Plan   1. S/P CABG x 4        2. Primary hypertension        3. Coronary artery disease involving native coronary artery of native heart without angina pectoris              SUMMARY/DISCUSSION  History of coronary artery disease.  Patient's status post coronary artery bypass grafting.  He is doing well his cholesterol profile noted above is excellent.  He remains on a good medical regimen which I would continue.  Hypertension blood pressure is great  Follow-up 1 year or sooner if issues encourage exercise.        MEDICATIONS         Discharge Medications            Accurate as of February 26, 2025  3:13 PM. If you have any questions, ask your nurse or doctor.                Continue These Medications        Instructions Start Date   allopurinol 300 MG tablet  Commonly known as: ZYLOPRIM   300 mg, Every Evening      aspirin 81 MG tablet   81 mg, Daily      atorvastatin 20 MG tablet  Commonly known as: LIPITOR   20 mg, Oral, Every Night at Bedtime      cetirizine 10 MG tablet  Commonly known as: zyrTEC   10 mg, Daily      fluticasone 50 MCG/ACT nasal spray  Commonly known as: FLONASE   2 sprays, Daily PRN      glimepiride 2 MG tablet  Commonly known as: AMARYL   2 mg, Oral, Every Morning      Janumet  MG per tablet  Generic drug: sitaGLIPtin-metFORMIN   1 tablet, Oral, 2 Times Daily With Meals      Jardiance 25 MG tablet tablet  Generic drug: empagliflozin   25 mg, Oral, Daily      Klor-Con/EF 25 MEQ disintegrating tablet  Generic drug: potassium bicarbonate   50 mEq, Daily      lisinopril-hydrochlorothiazide 20-12.5 MG per tablet  Commonly known as: Zestoretic   2 tablets, Oral, Daily      LORazepam 1 MG tablet  Commonly known as: ATIVAN   1 mg, As Needed      melatonin 5 MG tablet tablet   5 mg, Nightly      metoprolol succinate XL 50 MG  24 hr tablet  Commonly known as: TOPROL-XL   50 mg, Oral, Daily      montelukast 10 MG tablet  Commonly known as: SINGULAIR   10 mg, Nightly      multivitamin with minerals tablet tablet   1 tablet, Daily      nitroglycerin 0.4 MG SL tablet  Commonly known as: NITROSTAT   DISSOLVE 1 TABLET UNDER THE TONGUE AS NEEDED FOR CHEST PAIN MAY REPEAT  EVERY  5  MINUTES FOR UP TO 3 DOSES      traZODone 50 MG tablet  Commonly known as: DESYREL   50 mg      True Metrix Air Glucose Meter device   1 Device, Not Applicable, Daily      True Metrix Blood Glucose Test test strip  Generic drug: glucose blood   TEST BLOOD SUGAR TWO TIMES DAILY      TRUEplus Lancets 33G misc   TEST BLOOD SUGAR TWO TIMES DAILY                   **Dragon Disclaimer:   Much of this encounter note is an electronic transcription/translation of spoken language to printed text. The electronic translation of spoken language may permit erroneous, or at times, nonsensical words or phrases to be inadvertently transcribed. Although I have reviewed the note for such errors, some may still exist.

## 2025-05-29 RX ORDER — ATORVASTATIN CALCIUM 20 MG/1
20 TABLET, FILM COATED ORAL
Qty: 90 TABLET | Refills: 1 | Status: SHIPPED | OUTPATIENT
Start: 2025-05-29

## 2025-05-29 NOTE — TELEPHONE ENCOUNTER
Rx Refill Note  Requested Prescriptions     Pending Prescriptions Disp Refills    atorvastatin (LIPITOR) 20 MG tablet [Pharmacy Med Name: Atorvastatin Calcium 20 MG Oral Tablet] 90 tablet 3     Sig: TAKE 1 TABLET BY MOUTH EVERY  NIGHT AT BEDTIME      Last office visit with prescribing clinician: Visit date not found   Last telemedicine visit with prescribing clinician: Visit date not found   Next office visit with prescribing clinician: Visit date not found                         Would you like a call back once the refill request has been completed: [] Yes [] No    If the office needs to give you a call back, can they leave a voicemail: [] Yes [] No    Mercedes Doan  05/29/25, 11:16 EDT  Mercedes Doan  5/29/2025  11:16 EDT

## 2025-06-25 ENCOUNTER — OFFICE (OUTPATIENT)
Dept: URBAN - METROPOLITAN AREA CLINIC 76 | Facility: CLINIC | Age: 60
End: 2025-06-25
Payer: COMMERCIAL

## 2025-06-25 VITALS
SYSTOLIC BLOOD PRESSURE: 120 MMHG | OXYGEN SATURATION: 98 % | WEIGHT: 315 LBS | HEART RATE: 70 BPM | DIASTOLIC BLOOD PRESSURE: 80 MMHG | HEIGHT: 75 IN

## 2025-06-25 DIAGNOSIS — R93.2 ABNORMAL FINDINGS ON DIAGNOSTIC IMAGING OF LIVER AND BILIARY: ICD-10-CM

## 2025-06-25 DIAGNOSIS — R74.8 ABNORMAL LEVELS OF OTHER SERUM ENZYMES: ICD-10-CM

## 2025-06-25 PROCEDURE — 99203 OFFICE O/P NEW LOW 30 MIN: CPT | Performed by: NURSE PRACTITIONER

## 2025-07-29 RX ORDER — EMPAGLIFLOZIN 25 MG/1
25 TABLET, FILM COATED ORAL DAILY
Qty: 90 TABLET | Refills: 0 | Status: SHIPPED | OUTPATIENT
Start: 2025-07-29

## 2025-07-29 NOTE — TELEPHONE ENCOUNTER
Rx Refill Note  Requested Prescriptions     Pending Prescriptions Disp Refills    Jardiance 25 MG tablet tablet [Pharmacy Med Name: Jardiance 25 MG Oral Tablet] 90 tablet 3     Sig: TAKE 1 TABLET BY MOUTH DAILY      Last office visit with prescribing clinician: Visit date not found   Last telemedicine visit with prescribing clinician: Visit date not found   Next office visit with prescribing clinician: Visit date not found                         Would you like a call back once the refill request has been completed: [] Yes [] No    If the office needs to give you a call back, can they leave a voicemail: [] Yes [] No    Mercedes Doan  07/29/25, 07:32 EDT  Mercedes Doan  7/29/2025  07:32 EDT

## 2025-08-11 DIAGNOSIS — E11.65 TYPE 2 DIABETES MELLITUS WITH HYPERGLYCEMIA, WITHOUT LONG-TERM CURRENT USE OF INSULIN: ICD-10-CM

## 2025-08-12 LAB
ALBUMIN SERPL-MCNC: 4 G/DL (ref 3.5–5.2)
ALBUMIN/CREAT UR: 3 MG/G CREAT (ref 0–29)
ALBUMIN/GLOB SERPL: 1.5 G/DL
ALP SERPL-CCNC: 75 U/L (ref 39–117)
ALT SERPL-CCNC: 48 U/L (ref 1–41)
AST SERPL-CCNC: 34 U/L (ref 1–40)
BILIRUB SERPL-MCNC: 0.4 MG/DL (ref 0–1.2)
BUN SERPL-MCNC: 16 MG/DL (ref 6–20)
BUN/CREAT SERPL: 14.2 (ref 7–25)
CALCIUM SERPL-MCNC: 10.1 MG/DL (ref 8.6–10.5)
CHLORIDE SERPL-SCNC: 102 MMOL/L (ref 98–107)
CHOLEST SERPL-MCNC: 94 MG/DL (ref 0–200)
CO2 SERPL-SCNC: 28.7 MMOL/L (ref 22–29)
CREAT SERPL-MCNC: 1.13 MG/DL (ref 0.76–1.27)
CREAT UR-MCNC: 121.3 MG/DL
EGFRCR SERPLBLD CKD-EPI 2021: 75.3 ML/MIN/1.73
GLOBULIN SER CALC-MCNC: 2.6 GM/DL
GLUCOSE SERPL-MCNC: 123 MG/DL (ref 65–99)
HBA1C MFR BLD: 6.9 % (ref 4.8–5.6)
HDLC SERPL-MCNC: 24 MG/DL (ref 40–60)
IMP & REVIEW OF LAB RESULTS: NORMAL
LDLC SERPL CALC-MCNC: 42 MG/DL (ref 0–100)
MICROALBUMIN UR-MCNC: 3.5 UG/ML
POTASSIUM SERPL-SCNC: 4.6 MMOL/L (ref 3.5–5.2)
PROT SERPL-MCNC: 6.6 G/DL (ref 6–8.5)
SODIUM SERPL-SCNC: 143 MMOL/L (ref 136–145)
TRIGL SERPL-MCNC: 168 MG/DL (ref 0–150)
VLDLC SERPL CALC-MCNC: 28 MG/DL (ref 5–40)

## 2025-08-18 ENCOUNTER — OFFICE VISIT (OUTPATIENT)
Dept: ENDOCRINOLOGY | Age: 60
End: 2025-08-18
Payer: COMMERCIAL

## 2025-08-18 VITALS
SYSTOLIC BLOOD PRESSURE: 122 MMHG | OXYGEN SATURATION: 96 % | DIASTOLIC BLOOD PRESSURE: 78 MMHG | BODY MASS INDEX: 44.1 KG/M2 | HEIGHT: 71 IN | HEART RATE: 73 BPM | TEMPERATURE: 97.7 F | WEIGHT: 315 LBS

## 2025-08-18 DIAGNOSIS — E11.65 TYPE 2 DIABETES MELLITUS WITH HYPERGLYCEMIA, WITHOUT LONG-TERM CURRENT USE OF INSULIN: Primary | ICD-10-CM

## 2025-08-18 DIAGNOSIS — I25.10 CORONARY ARTERY DISEASE INVOLVING NATIVE CORONARY ARTERY OF NATIVE HEART WITHOUT ANGINA PECTORIS: ICD-10-CM

## 2025-08-18 DIAGNOSIS — E66.813 CLASS 3 SEVERE OBESITY DUE TO EXCESS CALORIES WITH SERIOUS COMORBIDITY AND BODY MASS INDEX (BMI) OF 45.0 TO 49.9 IN ADULT: ICD-10-CM

## 2025-08-18 DIAGNOSIS — Z95.1 S/P CABG X 4: ICD-10-CM

## 2025-08-18 DIAGNOSIS — E78.2 HYPERLIPEMIA, MIXED: ICD-10-CM

## 2025-08-18 PROCEDURE — 99214 OFFICE O/P EST MOD 30 MIN: CPT | Performed by: NURSE PRACTITIONER

## (undated) DEVICE — DRSNG SURESITE WNDW 2.38X2.75

## (undated) DEVICE — BNDG ELAS ELITE V/CLOSE 6IN 5YD LF STRL

## (undated) DEVICE — Device

## (undated) DEVICE — PK ATS CUST W CARDIOTOMY RESEVOIR

## (undated) DEVICE — SPNG DISECTOR KTNER XRAY COTN 1/4X9/16IN PK/5

## (undated) DEVICE — VASOVIEW HEMOPRO: Brand: VASOVIEW HEMOPRO

## (undated) DEVICE — 8 FOOT DISPOSABLE EXTENSION CABLE WITH SAFE CONNECT / ALLIGATOR CLIP

## (undated) DEVICE — CATH DIAG IMPULSE FL3.5 5F 100CM

## (undated) DEVICE — 28 FR STRAIGHT – SOFT PVC CATHETER: Brand: PVC THORACIC CATHETERS

## (undated) DEVICE — CATH VENT MIV RADL PIG ST TIP 4F 110CM

## (undated) DEVICE — GW EMR FIX EXCHG J STD .035 3MM 260CM

## (undated) DEVICE — MARKR SKIN W/RULR AND LBL

## (undated) DEVICE — SOL ISO/ALC RUB 70PCT 4OZ

## (undated) DEVICE — CONN STR 1/2INX3/8IN

## (undated) DEVICE — NDL PERC 1PRT THNWALL W/BASEPLT 18G 7CM

## (undated) DEVICE — GOWN ,SIRUS,NONREINFORCED 3XL: Brand: MEDLINE

## (undated) DEVICE — HEMOCONCENTRATOR PERFUS LPS06

## (undated) DEVICE — PK CATH CARD 40

## (undated) DEVICE — INTENDED FOR TISSUE SEPARATION, AND OTHER PROCEDURES THAT REQUIRE A SHARP SURGICAL BLADE TO PUNCTURE OR CUT.: Brand: BARD-PARKER ® CARBON RIB-BACK BLADES

## (undated) DEVICE — KT MANIFLD CARDIAC

## (undated) DEVICE — DRSNG WND GEL FIBR OPTICELL AG PLS W/SLV LF 4X5IN  STRL

## (undated) DEVICE — ACCESSRAIL PLATFORM (STANDARD BLADE): Brand: ACCESSRAIL PLATFORM (STANDARD BLADE)

## (undated) DEVICE — 3M™ MEDIPORE™ H SOFT CLOTH SURGICAL TAPE 2862, 2 INCH X 10 YARD (5CM X 9,1M), 12 ROLLS/CASE: Brand: 3M™ MEDIPORE™

## (undated) DEVICE — ACCESSRAIL PLATFORM (DEEP BLADE): Brand: ACCESSRAIL PLATFORM (DEEP BLADE)

## (undated) DEVICE — GLV SURG BIOGEL LTX PF 8

## (undated) DEVICE — PK HEART OPN 40

## (undated) DEVICE — ST. SORBAVIEW ULTIMATE IJ SYSTEM A,C: Brand: CENTURION

## (undated) DEVICE — DRP SLUSH MACH FOR STND ALONE OM-ORS-321

## (undated) DEVICE — SYS PERFUS SEP PLATLT W TIPS CUST

## (undated) DEVICE — BIOPATCH™ ANTIMICROBIAL DRESSING WITH CHLORHEXIDINE GLUCONATE IS A HYDROPHILLIC POLYURETHANE ABSORPTIVE FOAM WITH CHLORHEXIDINE GLUCONATE (CHG) WHICH INHIBITS BACTERIAL GROWTH UNDER THE DRESSING. THE DRESSING IS INTENDED TO BE USED TO ABSORB EXUDATE, COVER A WOUND CAUSED BY VASCULAR AND NONVASCULAR PERCUTANEOUS MEDICAL DEVICES DURING SURGERY, AS WELL AS REDUCE LOCAL INFECTION AND COLONIZATION OF MICROORGANISMS.: Brand: BIOPATCH

## (undated) DEVICE — GLIDESHEATH BASIC HYDROPHILIC COATED INTRODUCER SHEATH: Brand: GLIDESHEATH

## (undated) DEVICE — INSUFFLATION TUBING,LAPAROSCOPIC: Brand: DEROYAL

## (undated) DEVICE — CANN ART DLP 1PC EDPA A/ 22F

## (undated) DEVICE — OPTIFOAM GENTLE SA, POSTOP, 4X12: Brand: MEDLINE

## (undated) DEVICE — DEV INDEFLATOR

## (undated) DEVICE — ST TOURNI COMPL A/ 7IN

## (undated) DEVICE — SENSR CERBRL O2 PK/2

## (undated) DEVICE — SPNG GZ WOVN 4X4IN 12PLY 10/BX STRL

## (undated) DEVICE — SCANLAN® VASCU-STATT® II SINGLE-USE BULLDOG CLAMP W/FIRMER CLAMPING PRESS - MIDI ANGLED 45° (YELLOW), CLAMPING PRESSURE 75-80 G (2/STERILE PKG): Brand: SCANLAN® VASCU-STATT® II SINGLE-USE BULLDOG CLAMP W/FIRMER CLAMPING PRESS

## (undated) DEVICE — BLOWER/MISTER AXIOUS OPCAB W/TBG

## (undated) DEVICE — PK PERFUS CUST W/CARDIOPLEGIA

## (undated) DEVICE — VESSEL LOOPS X-RAY DETECTABLE: Brand: DEROYAL

## (undated) DEVICE — CLAMP INSERT: Brand: STEALTH® CLAMP INSERT

## (undated) DEVICE — CATH DIAG IMPULSE FR4 5F 100CM

## (undated) DEVICE — PDS II VLT 0 107CM AG ST3: Brand: ENDOLOOP

## (undated) DEVICE — CATH IV INSYTE AUTOGARD SHLD 20G 1.88IN

## (undated) DEVICE — TB SXN DLP RIGD MACROSUCKER 6F 3IN

## (undated) DEVICE — CATH F5 INF AR MOD 100CM: Brand: INFINITI

## (undated) DEVICE — CORONARY ARTERY BYPASS GRAFT MARKERS, STAINLESS STEEL, DISTAL, WITHOUT HOLDER: Brand: ANASTOMARK CORONARY ARTERY BYPASS GRAFT MARKERS, STAINLESS STEEL, DISTAL

## (undated) DEVICE — CANN VESL FREE FLO 2MM

## (undated) DEVICE — CVR PROB 96IN LF STRL

## (undated) DEVICE — CANN IRR VEN BVL TP

## (undated) DEVICE — BNDG ELAS ELITE V/CLOSE 4IN 5YD LF STRL

## (undated) DEVICE — Device: Brand: MEDEX

## (undated) DEVICE — TEMP PACING WIRE: Brand: MYO/WIRE

## (undated) DEVICE — ROTATING SURGICAL PUNCHES, 1 PER POUCH: Brand: A&E MEDICAL / ROTATING SURGICAL PUNCHES

## (undated) DEVICE — ST PERFUS M/

## (undated) DEVICE — ADHS SKIN DERMABOND TOP ADVANCED

## (undated) DEVICE — CANN AORT ROOT DLP VNT 14G 7F